# Patient Record
Sex: FEMALE | Race: BLACK OR AFRICAN AMERICAN | NOT HISPANIC OR LATINO | Employment: OTHER | URBAN - METROPOLITAN AREA
[De-identification: names, ages, dates, MRNs, and addresses within clinical notes are randomized per-mention and may not be internally consistent; named-entity substitution may affect disease eponyms.]

---

## 2017-01-01 ENCOUNTER — HOSPITAL ENCOUNTER (OUTPATIENT)
Dept: PULMONOLOGY | Facility: CLINIC | Age: 62
Discharge: HOME | End: 2017-09-11
Payer: MEDICARE

## 2017-01-01 ENCOUNTER — OFFICE VISIT (OUTPATIENT)
Dept: TRANSPLANT | Facility: CLINIC | Age: 62
End: 2017-01-01
Payer: MEDICARE

## 2017-01-01 ENCOUNTER — LAB VISIT (OUTPATIENT)
Dept: LAB | Facility: HOSPITAL | Age: 62
End: 2017-01-01
Attending: INTERNAL MEDICINE
Payer: MEDICARE

## 2017-01-01 ENCOUNTER — PATIENT MESSAGE (OUTPATIENT)
Dept: HEMATOLOGY/ONCOLOGY | Facility: CLINIC | Age: 62
End: 2017-01-01

## 2017-01-01 ENCOUNTER — TELEPHONE (OUTPATIENT)
Dept: HEMATOLOGY/ONCOLOGY | Facility: CLINIC | Age: 62
End: 2017-01-01

## 2017-01-01 ENCOUNTER — OFFICE VISIT (OUTPATIENT)
Dept: HEMATOLOGY/ONCOLOGY | Facility: CLINIC | Age: 62
End: 2017-01-01
Payer: MEDICARE

## 2017-01-01 ENCOUNTER — OFFICE VISIT (OUTPATIENT)
Dept: ORTHOPEDICS | Facility: CLINIC | Age: 62
End: 2017-01-01
Payer: MEDICARE

## 2017-01-01 ENCOUNTER — HOSPITAL ENCOUNTER (OUTPATIENT)
Dept: PULMONOLOGY | Facility: CLINIC | Age: 62
Discharge: HOME | End: 2017-12-11
Payer: MEDICARE

## 2017-01-01 ENCOUNTER — HOSPITAL ENCOUNTER (OUTPATIENT)
Dept: PULMONOLOGY | Facility: CLINIC | Age: 62
Discharge: HOME | End: 2017-06-15
Payer: MEDICARE

## 2017-01-01 ENCOUNTER — PATIENT MESSAGE (OUTPATIENT)
Dept: TRANSPLANT | Facility: CLINIC | Age: 62
End: 2017-01-01

## 2017-01-01 ENCOUNTER — TELEPHONE (OUTPATIENT)
Dept: ORTHOPEDICS | Facility: CLINIC | Age: 62
End: 2017-01-01

## 2017-01-01 ENCOUNTER — HOSPITAL ENCOUNTER (OUTPATIENT)
Dept: RADIOLOGY | Facility: HOSPITAL | Age: 62
Discharge: HOME | End: 2017-12-11
Attending: INTERNAL MEDICINE
Payer: MEDICARE

## 2017-01-01 ENCOUNTER — HOSPITAL ENCOUNTER (OUTPATIENT)
Dept: RADIOLOGY | Facility: HOSPITAL | Age: 62
Discharge: HOME | End: 2017-09-11
Attending: INTERNAL MEDICINE
Payer: MEDICARE

## 2017-01-01 ENCOUNTER — DOCUMENTATION ONLY (OUTPATIENT)
Dept: TRANSPLANT | Facility: CLINIC | Age: 62
End: 2017-01-01

## 2017-01-01 ENCOUNTER — HOSPITAL ENCOUNTER (OUTPATIENT)
Dept: RADIOLOGY | Facility: HOSPITAL | Age: 62
Discharge: HOME | End: 2017-05-01
Attending: ORTHOPAEDIC SURGERY
Payer: MEDICARE

## 2017-01-01 ENCOUNTER — HOSPITAL ENCOUNTER (OUTPATIENT)
Dept: RADIOLOGY | Facility: HOSPITAL | Age: 62
Discharge: HOME | End: 2017-06-15
Attending: INTERNAL MEDICINE
Payer: MEDICARE

## 2017-01-01 ENCOUNTER — HOSPITAL ENCOUNTER (OUTPATIENT)
Dept: PULMONOLOGY | Facility: CLINIC | Age: 62
Discharge: HOME | End: 2017-12-21
Payer: MEDICARE

## 2017-01-01 VITALS — BODY MASS INDEX: 30.29 KG/M2 | WEIGHT: 193 LBS | HEIGHT: 67 IN

## 2017-01-01 VITALS
WEIGHT: 198.44 LBS | HEART RATE: 80 BPM | HEIGHT: 68 IN | TEMPERATURE: 98 F | SYSTOLIC BLOOD PRESSURE: 146 MMHG | DIASTOLIC BLOOD PRESSURE: 93 MMHG | RESPIRATION RATE: 18 BRPM | OXYGEN SATURATION: 100 % | BODY MASS INDEX: 30.07 KG/M2

## 2017-01-01 VITALS
HEIGHT: 67 IN | TEMPERATURE: 99 F | WEIGHT: 192.44 LBS | BODY MASS INDEX: 30.2 KG/M2 | DIASTOLIC BLOOD PRESSURE: 91 MMHG | SYSTOLIC BLOOD PRESSURE: 143 MMHG | HEART RATE: 88 BPM

## 2017-01-01 VITALS
HEART RATE: 100 BPM | BODY MASS INDEX: 30.76 KG/M2 | RESPIRATION RATE: 20 BRPM | WEIGHT: 196 LBS | OXYGEN SATURATION: 97 % | SYSTOLIC BLOOD PRESSURE: 135 MMHG | DIASTOLIC BLOOD PRESSURE: 82 MMHG | HEIGHT: 67 IN | TEMPERATURE: 97 F

## 2017-01-01 VITALS
OXYGEN SATURATION: 99 % | DIASTOLIC BLOOD PRESSURE: 88 MMHG | WEIGHT: 195 LBS | HEIGHT: 67 IN | RESPIRATION RATE: 20 BRPM | HEART RATE: 94 BPM | BODY MASS INDEX: 30.61 KG/M2 | SYSTOLIC BLOOD PRESSURE: 133 MMHG | TEMPERATURE: 97 F

## 2017-01-01 VITALS
HEART RATE: 97 BPM | BODY MASS INDEX: 29.84 KG/M2 | HEIGHT: 67 IN | WEIGHT: 190.13 LBS | DIASTOLIC BLOOD PRESSURE: 95 MMHG | SYSTOLIC BLOOD PRESSURE: 131 MMHG

## 2017-01-01 VITALS
RESPIRATION RATE: 18 BRPM | SYSTOLIC BLOOD PRESSURE: 133 MMHG | TEMPERATURE: 98 F | BODY MASS INDEX: 29.27 KG/M2 | HEART RATE: 92 BPM | OXYGEN SATURATION: 99 % | HEIGHT: 68 IN | WEIGHT: 193.13 LBS | DIASTOLIC BLOOD PRESSURE: 81 MMHG

## 2017-01-01 VITALS
SYSTOLIC BLOOD PRESSURE: 124 MMHG | HEIGHT: 67 IN | HEART RATE: 98 BPM | OXYGEN SATURATION: 99 % | BODY MASS INDEX: 29.98 KG/M2 | TEMPERATURE: 98 F | DIASTOLIC BLOOD PRESSURE: 83 MMHG | WEIGHT: 191 LBS | RESPIRATION RATE: 20 BRPM

## 2017-01-01 DIAGNOSIS — C83.30 DIFFUSE LARGE B-CELL LYMPHOMA, UNSPECIFIED BODY REGION: ICD-10-CM

## 2017-01-01 DIAGNOSIS — T86.818: ICD-10-CM

## 2017-01-01 DIAGNOSIS — Z79.2 PROPHYLACTIC ANTIBIOTIC: ICD-10-CM

## 2017-01-01 DIAGNOSIS — J34.89 SINUS DRAINAGE: ICD-10-CM

## 2017-01-01 DIAGNOSIS — E83.42 HYPOMAGNESEMIA: ICD-10-CM

## 2017-01-01 DIAGNOSIS — Z94.2 HISTORY OF LUNG TRANSPLANT: ICD-10-CM

## 2017-01-01 DIAGNOSIS — Z48.298 AFTERCARE FOLLOWING ORGAN TRANSPLANT: Primary | ICD-10-CM

## 2017-01-01 DIAGNOSIS — S22.000A COMPRESSION FX, THORACIC SPINE, CLOSED, INITIAL ENCOUNTER: Primary | ICD-10-CM

## 2017-01-01 DIAGNOSIS — S22.000A COMPRESSION FRACTURE OF THORACIC VERTEBRA, CLOSED, INITIAL ENCOUNTER: ICD-10-CM

## 2017-01-01 DIAGNOSIS — Z94.2 LUNG REPLACED BY TRANSPLANT: ICD-10-CM

## 2017-01-01 DIAGNOSIS — C83.30 DLBCL (DIFFUSE LARGE B CELL LYMPHOMA): ICD-10-CM

## 2017-01-01 DIAGNOSIS — D84.9 IMMUNOSUPPRESSION: ICD-10-CM

## 2017-01-01 DIAGNOSIS — N18.3 CKD (CHRONIC KIDNEY DISEASE), STAGE 3 (MODERATE): ICD-10-CM

## 2017-01-01 DIAGNOSIS — C83.38 DIFFUSE LARGE B-CELL LYMPHOMA OF LYMPH NODES OF MULTIPLE REGIONS: ICD-10-CM

## 2017-01-01 DIAGNOSIS — J34.89 SINUS DRAINAGE: Primary | ICD-10-CM

## 2017-01-01 DIAGNOSIS — Z94.2 LUNG REPLACED BY TRANSPLANT: Primary | ICD-10-CM

## 2017-01-01 DIAGNOSIS — Z48.298 ENCOUNTER FOLLOWING ORGAN TRANSPLANT: Primary | ICD-10-CM

## 2017-01-01 DIAGNOSIS — M54.50 LUMBAR SPINE PAIN: Primary | ICD-10-CM

## 2017-01-01 DIAGNOSIS — C83.38 DIFFUSE LARGE B-CELL LYMPHOMA OF LYMPH NODES OF MULTIPLE REGIONS: Primary | ICD-10-CM

## 2017-01-01 DIAGNOSIS — N18.30 CHRONIC KIDNEY DISEASE (CKD), STAGE III (MODERATE): ICD-10-CM

## 2017-01-01 DIAGNOSIS — Z94.2 LUNG TRANSPLANTED: ICD-10-CM

## 2017-01-01 DIAGNOSIS — T86.819 COMPLICATION OF TRANSPLANTED LUNG, UNSPECIFIED COMPLICATION: Primary | ICD-10-CM

## 2017-01-01 DIAGNOSIS — D47.Z1: ICD-10-CM

## 2017-01-01 DIAGNOSIS — R53.81 MALAISE: ICD-10-CM

## 2017-01-01 DIAGNOSIS — M54.50 LUMBAR SPINE PAIN: ICD-10-CM

## 2017-01-01 DIAGNOSIS — C83.30 DLBCL (DIFFUSE LARGE B CELL LYMPHOMA): Primary | ICD-10-CM

## 2017-01-01 DIAGNOSIS — G89.3 CANCER ASSOCIATED PAIN: ICD-10-CM

## 2017-01-01 LAB
ALBUMIN SERPL BCP-MCNC: 3.7 G/DL
ALBUMIN SERPL BCP-MCNC: 3.8 G/DL
ALBUMIN SERPL BCP-MCNC: 3.8 G/DL
ALP SERPL-CCNC: 52 U/L
ALP SERPL-CCNC: 55 U/L
ALP SERPL-CCNC: 62 U/L
ALT SERPL W/O P-5'-P-CCNC: 16 U/L
ALT SERPL W/O P-5'-P-CCNC: 17 U/L
ALT SERPL W/O P-5'-P-CCNC: 17 U/L
ANION GAP SERPL CALC-SCNC: 11 MMOL/L
ANION GAP SERPL CALC-SCNC: 8 MMOL/L
ANION GAP SERPL CALC-SCNC: 9 MMOL/L
AST SERPL-CCNC: 18 U/L
AST SERPL-CCNC: 22 U/L
AST SERPL-CCNC: 25 U/L
BASOPHILS # BLD AUTO: 0.02 K/UL
BASOPHILS # BLD AUTO: 0.03 K/UL
BASOPHILS NFR BLD: 0.3 %
BASOPHILS NFR BLD: 0.5 %
BILIRUB SERPL-MCNC: 0.4 MG/DL
BILIRUB SERPL-MCNC: 0.4 MG/DL
BILIRUB SERPL-MCNC: 0.5 MG/DL
BUN SERPL-MCNC: 19 MG/DL
BUN SERPL-MCNC: 19 MG/DL
BUN SERPL-MCNC: 25 MG/DL
CALCIUM SERPL-MCNC: 9.5 MG/DL
CALCIUM SERPL-MCNC: 9.6 MG/DL
CALCIUM SERPL-MCNC: 9.7 MG/DL
CHLORIDE SERPL-SCNC: 103 MMOL/L
CHLORIDE SERPL-SCNC: 106 MMOL/L
CHLORIDE SERPL-SCNC: 107 MMOL/L
CO2 SERPL-SCNC: 23 MMOL/L
CO2 SERPL-SCNC: 27 MMOL/L
CO2 SERPL-SCNC: 28 MMOL/L
CREAT SERPL-MCNC: 1.2 MG/DL
CREAT SERPL-MCNC: 1.6 MG/DL
CREAT SERPL-MCNC: 1.7 MG/DL
DIFFERENTIAL METHOD: ABNORMAL
DIFFERENTIAL METHOD: ABNORMAL
ENTEROVIRUS: NOT DETECTED
EOSINOPHIL # BLD AUTO: 0.2 K/UL
EOSINOPHIL # BLD AUTO: 0.2 K/UL
EOSINOPHIL NFR BLD: 2.3 %
EOSINOPHIL NFR BLD: 3.6 %
ERYTHROCYTE [DISTWIDTH] IN BLOOD BY AUTOMATED COUNT: 12.7 %
ERYTHROCYTE [DISTWIDTH] IN BLOOD BY AUTOMATED COUNT: 13.2 %
EST. GFR  (AFRICAN AMERICAN): 37.3 ML/MIN/1.73 M^2
EST. GFR  (AFRICAN AMERICAN): 40.1 ML/MIN/1.73 M^2
EST. GFR  (AFRICAN AMERICAN): 56.8 ML/MIN/1.73 M^2
EST. GFR  (NON AFRICAN AMERICAN): 32.3 ML/MIN/1.73 M^2
EST. GFR  (NON AFRICAN AMERICAN): 34.8 ML/MIN/1.73 M^2
EST. GFR  (NON AFRICAN AMERICAN): 49.2 ML/MIN/1.73 M^2
GLUCOSE SERPL-MCNC: 100 MG/DL
GLUCOSE SERPL-MCNC: 79 MG/DL
GLUCOSE SERPL-MCNC: 86 MG/DL
HCT VFR BLD AUTO: 36.4 %
HCT VFR BLD AUTO: 36.6 %
HGB BLD-MCNC: 12 G/DL
HGB BLD-MCNC: 12.5 G/DL
HUMAN BOCAVIRUS: NOT DETECTED
HUMAN CORONAVIRUS, COMMON COLD VIRUS: NOT DETECTED
IMM GRANULOCYTES # BLD AUTO: 0.02 K/UL
IMM GRANULOCYTES NFR BLD AUTO: 0.3 %
INFLUENZA A - H1N1-09: NOT DETECTED
LDH SERPL L TO P-CCNC: 169 U/L
LDH SERPL L TO P-CCNC: 216 U/L
LYMPHOCYTES # BLD AUTO: 1.4 K/UL
LYMPHOCYTES # BLD AUTO: 1.6 K/UL
LYMPHOCYTES NFR BLD: 23.2 %
LYMPHOCYTES NFR BLD: 23.9 %
MCH RBC QN AUTO: 33.6 PG
MCH RBC QN AUTO: 34.7 PG
MCHC RBC AUTO-ENTMCNC: 32.8 G/DL
MCHC RBC AUTO-ENTMCNC: 34.3 G/DL
MCV RBC AUTO: 101 FL
MCV RBC AUTO: 103 FL
MONOCYTES # BLD AUTO: 0.7 K/UL
MONOCYTES # BLD AUTO: 0.9 K/UL
MONOCYTES NFR BLD: 10.9 %
MONOCYTES NFR BLD: 14.4 %
NEUTROPHILS # BLD AUTO: 3.5 K/UL
NEUTROPHILS # BLD AUTO: 4 K/UL
NEUTROPHILS NFR BLD: 58 %
NEUTROPHILS NFR BLD: 60.9 %
NRBC BLD-RTO: 0 /100 WBC
PARAINFLUENZA: NOT DETECTED
PLATELET # BLD AUTO: 170 K/UL
PLATELET # BLD AUTO: 185 K/UL
PMV BLD AUTO: 9.6 FL
PMV BLD AUTO: 9.9 FL
POTASSIUM SERPL-SCNC: 3.7 MMOL/L
POTASSIUM SERPL-SCNC: 4 MMOL/L
POTASSIUM SERPL-SCNC: 4.1 MMOL/L
PRE FEV1 FVC: 84
PRE FEV1 FVC: 85
PRE FEV1 FVC: 86
PRE FEV1 FVC: 87
PRE FEV1: 1.89
PRE FEV1: 1.97
PRE FEV1: 2.05
PRE FEV1: 2.1
PRE FVC: 2.24
PRE FVC: 2.26
PRE FVC: 2.39
PRE FVC: 2.46
PREDICTED FEV1 FVC: 78
PREDICTED FEV1: 2.65
PREDICTED FVC: 3.35
PROT SERPL-MCNC: 6.3 G/DL
PROT SERPL-MCNC: 6.3 G/DL
PROT SERPL-MCNC: 6.5 G/DL
RBC # BLD AUTO: 3.57 M/UL
RBC # BLD AUTO: 3.6 M/UL
RVP - ADENOVIRUS: NOT DETECTED
RVP - HUMAN METAPNEUMOVIRUS (HMPV): NOT DETECTED
RVP - INFLUENZA A: NOT DETECTED
RVP - INFLUENZA B: NOT DETECTED
RVP - RESPIRATORY SYNCTIAL VIRUS (RSV) A: NOT DETECTED
RVP - RESPIRATORY VIRAL PANEL, SOURCE: NORMAL
RVP - RHINOVIRUS: NOT DETECTED
SODIUM SERPL-SCNC: 139 MMOL/L
SODIUM SERPL-SCNC: 140 MMOL/L
SODIUM SERPL-SCNC: 143 MMOL/L
WBC # BLD AUTO: 6.03 K/UL
WBC # BLD AUTO: 6.61 K/UL

## 2017-01-01 PROCEDURE — 99499 UNLISTED E&M SERVICE: CPT | Mod: S$GLB,,, | Performed by: INTERNAL MEDICINE

## 2017-01-01 PROCEDURE — 71020 XR CHEST PA AND LATERAL: CPT | Mod: 26,,, | Performed by: RADIOLOGY

## 2017-01-01 PROCEDURE — 87632 RESP VIRUS 6-11 TARGETS: CPT

## 2017-01-01 PROCEDURE — 3075F SYST BP GE 130 - 139MM HG: CPT | Mod: S$GLB,,, | Performed by: PHYSICIAN ASSISTANT

## 2017-01-01 PROCEDURE — 94010 BREATHING CAPACITY TEST: CPT | Mod: S$GLB,,, | Performed by: INTERNAL MEDICINE

## 2017-01-01 PROCEDURE — 99214 OFFICE O/P EST MOD 30 MIN: CPT | Mod: 25,S$GLB,, | Performed by: INTERNAL MEDICINE

## 2017-01-01 PROCEDURE — 36415 COLL VENOUS BLD VENIPUNCTURE: CPT

## 2017-01-01 PROCEDURE — 1160F RVW MEDS BY RX/DR IN RCRD: CPT | Mod: S$GLB,,, | Performed by: PHYSICIAN ASSISTANT

## 2017-01-01 PROCEDURE — 3080F DIAST BP >= 90 MM HG: CPT | Mod: S$GLB,,, | Performed by: INTERNAL MEDICINE

## 2017-01-01 PROCEDURE — 99999 PR PBB SHADOW E&M-EST. PATIENT-LVL III: CPT | Mod: PBBFAC,,, | Performed by: INTERNAL MEDICINE

## 2017-01-01 PROCEDURE — 3077F SYST BP >= 140 MM HG: CPT | Mod: S$GLB,,, | Performed by: INTERNAL MEDICINE

## 2017-01-01 PROCEDURE — 99204 OFFICE O/P NEW MOD 45 MIN: CPT | Mod: S$GLB,,, | Performed by: PHYSICIAN ASSISTANT

## 2017-01-01 PROCEDURE — 71020 XR CHEST PA AND LATERAL: CPT | Mod: TC

## 2017-01-01 PROCEDURE — 99215 OFFICE O/P EST HI 40 MIN: CPT | Mod: S$GLB,,, | Performed by: INTERNAL MEDICINE

## 2017-01-01 PROCEDURE — 3008F BODY MASS INDEX DOCD: CPT | Mod: S$GLB,,, | Performed by: INTERNAL MEDICINE

## 2017-01-01 PROCEDURE — 3079F DIAST BP 80-89 MM HG: CPT | Mod: S$GLB,,, | Performed by: INTERNAL MEDICINE

## 2017-01-01 PROCEDURE — 3075F SYST BP GE 130 - 139MM HG: CPT | Mod: S$GLB,,, | Performed by: INTERNAL MEDICINE

## 2017-01-01 PROCEDURE — 72120 X-RAY BEND ONLY L-S SPINE: CPT | Mod: 26,,, | Performed by: RADIOLOGY

## 2017-01-01 PROCEDURE — 80053 COMPREHEN METABOLIC PANEL: CPT

## 2017-01-01 PROCEDURE — 99999 PR PBB SHADOW E&M-EST. PATIENT-LVL IV: CPT | Mod: PBBFAC,,, | Performed by: PHYSICIAN ASSISTANT

## 2017-01-01 PROCEDURE — 72100 X-RAY EXAM L-S SPINE 2/3 VWS: CPT | Mod: 26,,, | Performed by: RADIOLOGY

## 2017-01-01 PROCEDURE — 99499 UNLISTED E&M SERVICE: CPT | Mod: S$GLB,,, | Performed by: PHYSICIAN ASSISTANT

## 2017-01-01 PROCEDURE — 83615 LACTATE (LD) (LDH) ENZYME: CPT

## 2017-01-01 PROCEDURE — 85025 COMPLETE CBC W/AUTO DIFF WBC: CPT

## 2017-01-01 PROCEDURE — 3080F DIAST BP >= 90 MM HG: CPT | Mod: S$GLB,,, | Performed by: PHYSICIAN ASSISTANT

## 2017-01-01 PROCEDURE — 1160F RVW MEDS BY RX/DR IN RCRD: CPT | Mod: S$GLB,,, | Performed by: INTERNAL MEDICINE

## 2017-01-01 RX ORDER — PREDNISONE 5 MG/1
5 TABLET ORAL DAILY
Qty: 90 TABLET | Refills: 3 | Status: SHIPPED | OUTPATIENT
Start: 2017-01-01

## 2017-01-01 RX ORDER — TACROLIMUS 0.5 MG/1
0.5 CAPSULE ORAL EVERY 12 HOURS
Qty: 180 CAPSULE | Refills: 3 | Status: SHIPPED | OUTPATIENT
Start: 2017-01-01 | End: 2018-01-01 | Stop reason: SDUPTHER

## 2017-01-01 RX ORDER — TRAMADOL HYDROCHLORIDE 50 MG/1
50 TABLET ORAL EVERY 6 HOURS PRN
Qty: 90 TABLET | Refills: 0 | Status: SHIPPED | OUTPATIENT
Start: 2017-01-01 | End: 2018-01-01 | Stop reason: SDUPTHER

## 2017-01-01 RX ORDER — TACROLIMUS 1 MG/1
1 CAPSULE ORAL EVERY 12 HOURS
Qty: 180 CAPSULE | Refills: 3 | Status: SHIPPED | OUTPATIENT
Start: 2017-01-01

## 2017-01-01 RX ORDER — SULFAMETHOXAZOLE AND TRIMETHOPRIM 800; 160 MG/1; MG/1
1 TABLET ORAL
Qty: 36 TABLET | Refills: 4 | Status: SHIPPED | OUTPATIENT
Start: 2017-01-01

## 2017-01-01 RX ORDER — TRAMADOL HYDROCHLORIDE 50 MG/1
50 TABLET ORAL EVERY 6 HOURS PRN
Qty: 90 TABLET | Refills: 3 | Status: SHIPPED | OUTPATIENT
Start: 2017-01-01 | End: 2017-01-01

## 2017-01-06 ENCOUNTER — LAB VISIT (OUTPATIENT)
Dept: LAB | Facility: HOSPITAL | Age: 62
End: 2017-01-06
Attending: INTERNAL MEDICINE
Payer: MEDICARE

## 2017-01-06 DIAGNOSIS — Z94.2 LUNG REPLACED BY TRANSPLANT: ICD-10-CM

## 2017-01-06 LAB
BASOPHILS # BLD AUTO: 0.01 K/UL
BASOPHILS NFR BLD: 0.2 %
DIFFERENTIAL METHOD: ABNORMAL
EOSINOPHIL # BLD AUTO: 0.4 K/UL
EOSINOPHIL NFR BLD: 8.2 %
ERYTHROCYTE [DISTWIDTH] IN BLOOD BY AUTOMATED COUNT: 13.6 %
HCT VFR BLD AUTO: 37 %
HGB BLD-MCNC: 12 G/DL
LYMPHOCYTES # BLD AUTO: 1.9 K/UL
LYMPHOCYTES NFR BLD: 39.9 %
MCH RBC QN AUTO: 32.2 PG
MCHC RBC AUTO-ENTMCNC: 32.4 %
MCV RBC AUTO: 99 FL
MONOCYTES # BLD AUTO: 0.6 K/UL
MONOCYTES NFR BLD: 11.9 %
NEUTROPHILS # BLD AUTO: 1.8 K/UL
NEUTROPHILS NFR BLD: 39.4 %
PLATELET # BLD AUTO: 158 K/UL
PMV BLD AUTO: 10.9 FL
RBC # BLD AUTO: 3.73 M/UL
WBC # BLD AUTO: 4.64 K/UL

## 2017-01-06 PROCEDURE — 36415 COLL VENOUS BLD VENIPUNCTURE: CPT | Mod: PO

## 2017-01-06 PROCEDURE — 85025 COMPLETE CBC W/AUTO DIFF WBC: CPT

## 2017-01-09 LAB
CYTOMEGALOVIRUS DNA: NOT DETECTED
CYTOMEGALOVIRUS LOG (IU/ML): <2.4 LOG (10) COPIES/ML
CYTOMEGALOVIRUS PCR, QUANT: <250 COPIES/ML
CYTOMEGALOVIRUS SOURCE: NORMAL

## 2017-01-11 ENCOUNTER — LAB VISIT (OUTPATIENT)
Dept: LAB | Facility: HOSPITAL | Age: 62
End: 2017-01-11
Attending: INTERNAL MEDICINE
Payer: MEDICARE

## 2017-01-11 ENCOUNTER — OFFICE VISIT (OUTPATIENT)
Dept: INTERNAL MEDICINE | Facility: CLINIC | Age: 62
End: 2017-01-11
Payer: MEDICARE

## 2017-01-11 VITALS
SYSTOLIC BLOOD PRESSURE: 142 MMHG | BODY MASS INDEX: 29.65 KG/M2 | HEIGHT: 67 IN | WEIGHT: 188.94 LBS | HEART RATE: 88 BPM | DIASTOLIC BLOOD PRESSURE: 90 MMHG

## 2017-01-11 DIAGNOSIS — E78.5 HYPERLIPIDEMIA, UNSPECIFIED HYPERLIPIDEMIA TYPE: ICD-10-CM

## 2017-01-11 DIAGNOSIS — Z00.00 ENCOUNTER FOR PREVENTIVE HEALTH EXAMINATION: Primary | ICD-10-CM

## 2017-01-11 DIAGNOSIS — Z94.2 LUNG REPLACED BY TRANSPLANT: ICD-10-CM

## 2017-01-11 DIAGNOSIS — D47.Z1 POST-TRANSPLANT LYMPHOPROLIFERATIVE DISORDER: ICD-10-CM

## 2017-01-11 DIAGNOSIS — T45.1X5A CHEMOTHERAPY-INDUCED NEUTROPENIA: ICD-10-CM

## 2017-01-11 DIAGNOSIS — C83.38 DIFFUSE LARGE B-CELL LYMPHOMA OF LYMPH NODES OF MULTIPLE REGIONS: ICD-10-CM

## 2017-01-11 DIAGNOSIS — N18.30 STAGE 3 CHRONIC KIDNEY DISEASE: ICD-10-CM

## 2017-01-11 DIAGNOSIS — D84.9 IMMUNOSUPPRESSION: ICD-10-CM

## 2017-01-11 DIAGNOSIS — M48.54XA COMPRESSION FRACTURE OF THORACIC VERTEBRA, NON-TRAUMATIC, INITIAL ENCOUNTER: ICD-10-CM

## 2017-01-11 DIAGNOSIS — D70.1 CHEMOTHERAPY-INDUCED NEUTROPENIA: ICD-10-CM

## 2017-01-11 DIAGNOSIS — F32.0 MAJOR DEPRESSIVE DISORDER, SINGLE EPISODE, MILD: ICD-10-CM

## 2017-01-11 DIAGNOSIS — I10 ESSENTIAL HYPERTENSION: ICD-10-CM

## 2017-01-11 DIAGNOSIS — G47.33 OSA (OBSTRUCTIVE SLEEP APNEA): ICD-10-CM

## 2017-01-11 LAB
BASOPHILS # BLD AUTO: 0.02 K/UL
BASOPHILS NFR BLD: 0.6 %
DIFFERENTIAL METHOD: ABNORMAL
EOSINOPHIL # BLD AUTO: 0.2 K/UL
EOSINOPHIL NFR BLD: 5.1 %
ERYTHROCYTE [DISTWIDTH] IN BLOOD BY AUTOMATED COUNT: 13.3 %
HCT VFR BLD AUTO: 34.9 %
HGB BLD-MCNC: 11.3 G/DL
LYMPHOCYTES # BLD AUTO: 1.2 K/UL
LYMPHOCYTES NFR BLD: 34.9 %
MCH RBC QN AUTO: 32.2 PG
MCHC RBC AUTO-ENTMCNC: 32.4 %
MCV RBC AUTO: 99 FL
MONOCYTES # BLD AUTO: 0.4 K/UL
MONOCYTES NFR BLD: 12.4 %
NEUTROPHILS # BLD AUTO: 1.6 K/UL
NEUTROPHILS NFR BLD: 46.2 %
PLATELET # BLD AUTO: 139 K/UL
PMV BLD AUTO: 10.6 FL
RBC # BLD AUTO: 3.51 M/UL
WBC # BLD AUTO: 3.55 K/UL

## 2017-01-11 PROCEDURE — 99999 PR PBB SHADOW E&M-EST. PATIENT-LVL V: CPT | Mod: PBBFAC,,, | Performed by: NURSE PRACTITIONER

## 2017-01-11 PROCEDURE — 3077F SYST BP >= 140 MM HG: CPT | Mod: S$GLB,,, | Performed by: NURSE PRACTITIONER

## 2017-01-11 PROCEDURE — 3080F DIAST BP >= 90 MM HG: CPT | Mod: S$GLB,,, | Performed by: NURSE PRACTITIONER

## 2017-01-11 PROCEDURE — G0439 PPPS, SUBSEQ VISIT: HCPCS | Mod: S$GLB,,, | Performed by: NURSE PRACTITIONER

## 2017-01-11 PROCEDURE — 99499 UNLISTED E&M SERVICE: CPT | Mod: S$GLB,,, | Performed by: NURSE PRACTITIONER

## 2017-01-11 NOTE — PATIENT INSTRUCTIONS
Counseling and Referral of Other Preventative  (Italic type indicates deductible and co-insurance are waived)    Patient Name: Lena Lynn  Today's Date: 1/11/2017      SERVICE LIMITATIONS RECOMMENDATION    Vaccines    · Pneumococcal (once after 65)    · Influenza (annually)    · Hepatitis B (if medium/high risk)    · Prevnar 13      Hepatitis B medium/high risk factors:       - End-stage renal disease       - Hemophiliacs who received Factor VII or         IX concentrates       - Clients of institutions for the mentally             retarded       - Persons who live in the same house as          a HepB carrier       - Homosexual men       - Illicit injectable drug abusers     Pneumococcal: Done, no repeat necessary     Influenza: Scheduled - see appointments     Hepatitis B: N/A defer     Prevnar 13: Scheduled - see appointments    Mammogram (biennial age 50-74)  Annually (age 40 or over)  Recommended to patient, declined    Pap (up to age 70 and after 70 if unknown history or abnormal study last 10 years)    Recommended to patient, declined     The USPSTF recommends screening for cervical cancer in women age 21 to 65 years with cytology (Pap smear) every 3 years.     Colorectal cancer screening (to age 75)    · Fecal occult blood test (annual)  · Flexible sigmoidoscopy (5y)  · Screening colonoscopy (10y)  · Barium enema   Recommended to patient, declined    Diabetes self-management training (no USPSTF recommendations)  Requires referral by treating physician for patient with diabetes or renal disease. 10 hours of initial DSMT sessions of no less than 30 minutes each in a continuous 12-month period. 2 hours of follow-up DSMT in subsequent years.  N/A n/a    Bone mass measurements (age 65 & older, biennial)  Requires diagnosis related to osteoporosis or estrogen deficiency. Biennial benefit unless patient has history of long-term glucocorticoid  Recommended to patient, declined    Glaucoma screening (no USPSTF  recommendation)  Diabetes mellitus, family history   , age 50 or over    American, age 65 or over  Done this year, repeat every year    Medical nutrition therapy for diabetes or renal disease (no recommended schedule)  Requires referral by treating physician for patient with diabetes or renal disease or kidney transplant within the past 3 years.  Can be provided in same year as diabetes self-management training (DSMT), and CMS recommends medical nutrition therapy take place after DSMT. Up to 3 hours for initial year and 2 hours in subsequent years.  N/A n/a    Cardiovascular screening blood tests (every 5 years)  · Fasting lipid panel  Order as a panel if possible  Done this year, repeat every year    Diabetes screening tests (at least every 3 years, Medicare covers annually or at 6-month intervals for prediabetic patients)  · Fasting blood sugar (FBS) or glucose tolerance test (GTT)  Patient must be diagnosed with one of the following:       - Hypertension       - Dyslipidemia       - Obesity (BMI 30kg/m2)       - Previous elevated impaired FBS or GTT       ... or any two of the following:       - Overweight (BMI 25 but <30)       - Family history of diabetes       - Age 65 or older       - History of gestational diabetes or birth of baby weighing more than 9 pounds  Done this year, repeat every year    Abdominal aortic aneurysm screening (once)  · Sonogram   Limited to patients who meet one of the following criteria:       - Men who are 65-75 years old and have smoked more than 100 cigarette in their lifetime       - Anyone with a family history of abdominal aortic aneurysm       - Anyone recommended for screening by the USPSTF  N/A n/a    HIV screening (annually for increased risk patients)  · HIV-1 and HIV-2 by EIA, or SENA, rapid antibody test or oral mucosa transudate  Patients must be at increased risk for HIV infection per USPSTF guidelines or pregnant. Tests covered annually for  patient at increased risk or as requested by the patient. Pregnant patients may receive up to 3 tests during pregnancy.  Risks discussed, screening is not recommended    Smoking cessation counseling (up to 8 sessions per year)  Patients must be asymptomatic of tobacco-related conditions to receive as a preventative service.  n/a    Subsequent annual wellness visit  At least 12 months since last AWV  Return in one year     The following information is provided to all patients.  This information is to help you find resources for any of the problems found today that may be affecting your health:                Living healthy guide: www.[facility].[place].[facility]      Understanding Diabetes: www.diabetes.org      Eating healthy: www.cdc.gov/healthyweight      [facility] home safety checklist: www.cdc.gov/steadi/patient.html      Agency on Aging: www.goea.[place].[facility]      Alcoholics anonymous (AA): www.aa.org      Physical Activity: www.[place].nih.gov/ve9rihc      Tobacco use: www.quitwithusla.org

## 2017-01-11 NOTE — MR AVS SNAPSHOT
[facility] - Internal Medicine  [address]  [facility] [address]  Phone: [phone]  Fax: [phone]                  Lena Lynn   2017 10:00 AM   Office Visit    Description:  Female : 1955   Provider:  PARMINDER WINSTON 5   Department:  [facility] - Internal Medicine           Reason for Visit     Health Risk Assessment           Diagnoses this Visit        Comments    Stage 3 chronic kidney disease    -  Primary     Post-transplant lymphoproliferative disorder         DOROTHY (obstructive sleep apnea)         Major depressive disorder, single episode, mild         Lung replaced by transplant         Essential hypertension         Hyperlipidemia, unspecified hyperlipidemia type         Diffuse large B-cell lymphoma of lymph nodes of multiple regions         Compression fracture of thoracic vertebra, non-traumatic, initial encounter         Chemotherapy-induced neutropenia         Need for pneumococcal vaccine         Encounter for preventive health examination                To Do List           Future Appointments        Provider Department Dept Phone    2017 8:25 AM LAB, TRANSPLANT [facility] [phone]    2017 8:45 AM [facility] [id number] LB LIMIT [facility] [phone]    2017 9:15 AM PULMONARY FUNCTION [facility] Pulmonary Lab [phone]    2017 9:30 AM Erick Nieves MD [facility] Lung Transplant [phone]      Goals (5 Years of Data)              16    Blood Pressure < 130/85   122/70  150/81  135/77      [facility]     [facility] Care [facility] -  Assistance  Registered nurses in the [facility] provide clinical advisement, health education, appointment booking, and other advisory services.  Call for this free service at [phone].             Medications           Message regarding Medications     Verify the changes and/or additions to your medication regime  listed below are the same as discussed with your clinician today.  If any of these changes or additions are incorrect, please notify your healthcare provider.             Verify that the below list of medications is an accurate representation of the medications you are currently taking.  If none reported, the list may be blank. If incorrect, please contact your healthcare provider. Carry this list with you in case of emergency.           Current Medications     alendronate (FOSAMAX) 70 MG tablet Take 1 tablet (70 mg total) by mouth every 7 days.    aspirin (ECOTRIN) 81 MG EC tablet Take 1 tablet (81 mg total) by mouth once daily.    calcium carbonate (OS-JERONIMO) 600 mg (1,500 mg) Tab Take 1 tablet (600 mg total) by mouth 2 (two) times daily with meals.    citalopram (CELEXA) 20 MG tablet TAKE 1 TABLET EVERY DAY    diphenhydrAMINE (BENADRYL) 25 mg capsule Take 50 mg by mouth nightly as needed.     hydrocodone-acetaminophen 5-325mg (NORCO) 5-325 mg per tablet Take 1 tablet by mouth every 6 (six) hours as needed for Pain.    lidocaine-prilocaine (EMLA) cream Apply to affected area once    lorazepam (ATIVAN) 1 MG tablet Take 1 tablet (1 mg total) by mouth every 12 (twelve) hours as needed for Anxiety.    magnesium oxide (MAG-OX) 400 mg tablet Take 1 tablet (400 mg total) by mouth 2 (two) times daily.    multivitamin (MULTIVITAMIN) per tablet Take 1 tablet by mouth once daily.      omeprazole (PRILOSEC) 40 MG capsule Take 1 capsule (40 mg total) by mouth once daily.    ondansetron (ZOFRAN-ODT) 8 MG TbDL Take 1 tablet (8 mg total) by mouth every 8 (eight) hours as needed.    predniSONE (DELTASONE) 5 MG tablet TAKE 1 TABLET ONE TIME DAILY    predniSONE (DELTASONE) 50 MG Tab Take 2 tablets (100 mg total) by mouth once daily. On day of chemotherapy and following 4 days. 5 days total.    sulfamethoxazole-trimethoprim 800-160mg (BACTRIM DS) 800-160 mg Tab Take 1 tablet by mouth every Mon, Wed, Fri.    tacrolimus (PROGRAF) 0.5 MG  "Cap Take 1 capsule (0.5 mg total) by mouth once daily. Take in am    tacrolimus (PROGRAF) 1 MG Cap Take 1 capsule (1 mg total) by mouth every 12 (twelve) hours. Daily doses: 1.5 mg in am, 1 mg in pm    tramadol (ULTRAM) 50 mg tablet Take 1 tablet (50 mg total) by mouth every 6 (six) hours as needed for Pain.    trazodone (DESYREL) 50 MG tablet Please provide 3 months supply  I pill PRN QHS Insomnia           Clinical Reference Information           Vital Signs - Last Recorded  Most recent update: 1/11/2017 10:46 AM by Neetu Rogers NP    Pulse Ht Wt LMP BMI    88 5' 7" (1.702 m) 85.7 kg (188 lb 15 oz) (LMP Unknown) 29.59 kg/m2      Allergies as of 1/11/2017     No Known Allergies      Immunizations Administered on Date of Encounter - 1/11/2017     Name Date Dose VIS Date Route    Pneumococcal Conjugate - 13 Valent  Incomplete 0.5 mL 11/5/2015 Intramuscular      Orders Placed During Today's Visit      Normal Orders This Visit    Pneumococcal Conjugate Vaccine (13 Valent) (IM)       Maintenance Dialysis History     Patient has no recorded history of maintenance dialysis.      Transplant Information        Txp Date Organ Coordinator Care Team    4/25/2012 Lung Corrie Weiss RN Surgeon:  Alexei Silvestre MD   Co-Surgeon:  Francisco Farooq MD   Referring Physician:  Tommy Talavera MD         Instructions      Counseling and Referral of Other Preventative  (Italic type indicates deductible and co-insurance are waived)    Patient Name: Lena Lynn  Today's Date: 1/11/2017      SERVICE LIMITATIONS RECOMMENDATION    Vaccines    · Pneumococcal (once after 65)    · Influenza (annually)    · Hepatitis B (if medium/high risk)    · Prevnar 13      Hepatitis B medium/high risk factors:       - End-stage renal disease       - Hemophiliacs who received Factor VII or         IX concentrates       - Clients of institutions for the mentally             retarded       - Persons who live in the same house as          a HepB " carrier       - Homosexual men       - Illicit injectable drug abusers     Pneumococcal: Done, no repeat necessary     Influenza: Scheduled - see appointments     Hepatitis B: N/A defer     Prevnar 13: Scheduled - see appointments    Mammogram (biennial age 50-74)  Annually (age 40 or over)  Recommended to patient, declined    Pap (up to age 70 and after 70 if unknown history or abnormal study last 10 years)    Recommended to patient, declined     The USPSTF recommends screening for cervical cancer in women age 21 to 65 years with cytology (Pap smear) every 3 years.     Colorectal cancer screening (to age 75)    · Fecal occult blood test (annual)  · Flexible sigmoidoscopy (5y)  · Screening colonoscopy (10y)  · Barium enema   Recommended to patient, declined    Diabetes self-management training (no USPSTF recommendations)  Requires referral by treating physician for patient with diabetes or renal disease. 10 hours of initial DSMT sessions of no less than 30 minutes each in a continuous 12-month period. 2 hours of follow-up DSMT in subsequent years.  N/A n/a    Bone mass measurements (age 65 & older, biennial)  Requires diagnosis related to osteoporosis or estrogen deficiency. Biennial benefit unless patient has history of long-term glucocorticoid  Recommended to patient, declined    Glaucoma screening (no USPSTF recommendation)  Diabetes mellitus, family history   , age 50 or over    American, age 65 or over  Done this year, repeat every year    Medical nutrition therapy for diabetes or renal disease (no recommended schedule)  Requires referral by treating physician for patient with diabetes or renal disease or kidney transplant within the past 3 years.  Can be provided in same year as diabetes self-management training (DSMT), and CMS recommends medical nutrition therapy take place after DSMT. Up to 3 hours for initial year and 2 hours in subsequent years.  N/A n/a    Cardiovascular screening  blood tests (every 5 years)  · Fasting lipid panel  Order as a panel if possible  Done this year, repeat every year    Diabetes screening tests (at least every 3 years, Medicare covers annually or at 6-month intervals for prediabetic patients)  · Fasting blood sugar (FBS) or glucose tolerance test (GTT)  Patient must be diagnosed with one of the following:       - Hypertension       - Dyslipidemia       - Obesity (BMI 30kg/m2)       - Previous elevated impaired FBS or GTT       ... or any two of the following:       - Overweight (BMI 25 but <30)       - Family history of diabetes       - Age 65 or older       - History of gestational diabetes or birth of baby weighing more than 9 pounds  Done this year, repeat every year    Abdominal aortic aneurysm screening (once)  · Sonogram   Limited to patients who meet one of the following criteria:       - Men who are 65-75 years old and have smoked more than 100 cigarette in their lifetime       - Anyone with a family history of abdominal aortic aneurysm       - Anyone recommended for screening by the USPSTF  N/A n/a    HIV screening (annually for increased risk patients)  · HIV-1 and HIV-2 by EIA, or SENA, rapid antibody test or oral mucosa transudate  Patients must be at increased risk for HIV infection per USPSTF guidelines or pregnant. Tests covered annually for patient at increased risk or as requested by the patient. Pregnant patients may receive up to 3 tests during pregnancy.  Risks discussed, screening is not recommended    Smoking cessation counseling (up to 8 sessions per year)  Patients must be asymptomatic of tobacco-related conditions to receive as a preventative service.  n/a    Subsequent annual wellness visit  At least 12 months since last AWV  Return in one year     The following information is provided to all patients.  This information is to help you find resources for any of the problems found today that may be affecting your health:                 Living healthy guide: www.[facility].[place].[facility]      Understanding Diabetes: www.diabetes.org      Eating healthy: www.cdc.gov/healthyweight      CDC home safety checklist: www.cdc.gov/steadi/patient.html      Agency on Aging: www.goea.[place].[facility]      Alcoholics anonymous (AA): www.aa.org      Physical Activity: www.[place].nih.gov/yo2tqaw      Tobacco use: www.quitwithusla.org

## 2017-01-11 NOTE — PROGRESS NOTES
"Lena Lynn presented for a  Medicare AWV and comprehensive Health Risk Assessment today. The following components were reviewed and updated:    · Medical history  · Family History  · Social history  · Allergies and Current Medications  · Health Risk Assessment  · Health Maintenance  · Care Team     ** See Completed Assessments for Annual Wellness Visit within the encounter summary.**       The following assessments were completed:  · Living Situation  · Depression Screening  · Timed Get Up and Go  · Whisper Test  · Cognitive Function Screening  · Nutrition Screening  · ADL Screening  · PAQ Screening            Vitals:    01/11/17 1042   BP: (!) 142/90   Pulse: 88   Weight: 85.7 kg (188 lb 15 oz)   Height: 5' 7" (1.702 m)     Body mass index is 29.59 kg/(m^2).     Physical Exam   Constitutional: She is oriented to person, place, and time. She appears well-developed and well-nourished. No distress.   HENT:   Head: Normocephalic and atraumatic.   Cardiovascular: Normal rate, regular rhythm and normal heart sounds.    No murmur heard.  Pulmonary/Chest: Effort normal and breath sounds normal. No respiratory distress. She has no wheezes. She has no rales.   Musculoskeletal: She exhibits no edema, tenderness or deformity.   Slow gait   Neurological: She is alert and oriented to person, place, and time.   Skin: Skin is warm and dry. She is not diaphoretic.   Psychiatric: She has a normal mood and affect. Her behavior is normal. She expresses no homicidal and no suicidal ideation. She expresses no suicidal plans and no homicidal plans.   Nursing note and vitals reviewed.        Diagnoses and health risks identified today and associated recommendations/orders:    1. Encounter for preventive health examination  Declined mammogram, colonoscopy, pap smear, and dxa scan.  Will defer Prevnar 13, patient wants to check with her Transplant Dr prior to receiving.     2. Lung replaced by transplant  Stable.   Followed by " Transplant.     3. Stage 3 chronic kidney disease  Stable.   Followed by Transplant.     4. Post-transplant lymphoproliferative disorder  Stable.   Followed by Hem/[facility].     5. DOROTHY (obstructive sleep apnea)  Stable.   Followed by Sleep Medicine.     6. Major depressive disorder, single episode, mild  Stable.   Continue current medication.  Followed by Transplant.     7. Essential hypertension  Elevated.  Asymptomatic. Denies HA, CP, SOB, and weakness.  She admitted to eating a pickle and chips prior to visit.   Advised to follow low sodium diet.    Followed by Transplant.     8. Hyperlipidemia, unspecified hyperlipidemia type  Stable.   Followed by Transplant.     9. Diffuse large B-cell lymphoma of lymph nodes of multiple regions  Stable.   Followed by Hem/Onc.     10. Compression fracture of thoracic vertebra, non-traumatic, initial encounter  Stable.   Followed by Transplant.     11. Chemotherapy-induced neutropenia  Stable.   Followed by Hem/Onc.     12. Immunosuppression  Stable.   Followed by Transplant.       Provided Lena with a 5-10 year written screening schedule and personal prevention plan. Recommendations were developed using the USPSTF age appropriate recommendations. Education, counseling, and referrals were provided as needed. After Visit Summary printed and given to patient which includes a list of additional screenings\tests needed.    Return in about 12 days (around 1/23/2017) for follow up with PCP and as needed.    Neetu Rogers NP

## 2017-01-23 ENCOUNTER — HOSPITAL ENCOUNTER (OUTPATIENT)
Dept: RADIOLOGY | Facility: HOSPITAL | Age: 62
Discharge: HOME | End: 2017-01-23
Attending: INTERNAL MEDICINE
Payer: MEDICARE

## 2017-01-23 ENCOUNTER — HOSPITAL ENCOUNTER (OUTPATIENT)
Dept: PULMONOLOGY | Facility: CLINIC | Age: 62
Discharge: HOME | End: 2017-01-23
Payer: MEDICARE

## 2017-01-23 ENCOUNTER — OFFICE VISIT (OUTPATIENT)
Dept: TRANSPLANT | Facility: CLINIC | Age: 62
End: 2017-01-23
Payer: MEDICARE

## 2017-01-23 VITALS
HEIGHT: 67 IN | WEIGHT: 189 LBS | RESPIRATION RATE: 20 BRPM | OXYGEN SATURATION: 98 % | HEART RATE: 91 BPM | TEMPERATURE: 98 F | SYSTOLIC BLOOD PRESSURE: 134 MMHG | BODY MASS INDEX: 29.66 KG/M2 | DIASTOLIC BLOOD PRESSURE: 98 MMHG

## 2017-01-23 DIAGNOSIS — Z79.2 PROPHYLACTIC ANTIBIOTIC: ICD-10-CM

## 2017-01-23 DIAGNOSIS — Z94.2 LUNG REPLACED BY TRANSPLANT: ICD-10-CM

## 2017-01-23 DIAGNOSIS — D84.9 IMMUNOSUPPRESSION: ICD-10-CM

## 2017-01-23 DIAGNOSIS — Z48.298 AFTERCARE FOLLOWING ORGAN TRANSPLANT: Primary | ICD-10-CM

## 2017-01-23 DIAGNOSIS — D47.Z1 POST-TRANSPLANT LYMPHOPROLIFERATIVE DISORDER: ICD-10-CM

## 2017-01-23 LAB
PRE FEV1 FVC: 84
PRE FEV1: 2.07
PRE FVC: 2.47
PREDICTED FEV1 FVC: 78
PREDICTED FEV1: 2.65
PREDICTED FVC: 3.35

## 2017-01-23 PROCEDURE — 94010 BREATHING CAPACITY TEST: CPT | Mod: S$GLB,,, | Performed by: INTERNAL MEDICINE

## 2017-01-23 PROCEDURE — 3080F DIAST BP >= 90 MM HG: CPT | Mod: S$GLB,,, | Performed by: INTERNAL MEDICINE

## 2017-01-23 PROCEDURE — 71020 XR CHEST PA AND LATERAL: CPT | Mod: 26,,, | Performed by: RADIOLOGY

## 2017-01-23 PROCEDURE — 99214 OFFICE O/P EST MOD 30 MIN: CPT | Mod: 25,S$GLB,, | Performed by: INTERNAL MEDICINE

## 2017-01-23 PROCEDURE — 99499 UNLISTED E&M SERVICE: CPT | Mod: S$GLB,,, | Performed by: INTERNAL MEDICINE

## 2017-01-23 PROCEDURE — 3075F SYST BP GE 130 - 139MM HG: CPT | Mod: S$GLB,,, | Performed by: INTERNAL MEDICINE

## 2017-01-23 PROCEDURE — 1159F MED LIST DOCD IN RCRD: CPT | Mod: S$GLB,,, | Performed by: INTERNAL MEDICINE

## 2017-01-23 PROCEDURE — 99999 PR PBB SHADOW E&M-EST. PATIENT-LVL III: CPT | Mod: PBBFAC,,, | Performed by: INTERNAL MEDICINE

## 2017-01-23 NOTE — PROGRESS NOTES
LUNG TRANSPLANT RECIPIENT FOLLOW-UP      Reason for Visit: Follow-up after lung transplantation.      Date of Transplant: 4/25/12      Reason for Transplant: Hypersensitivity Pneumonitis      Type of Transplant: bilateral sequential lung      CMV Status: D+ / R+      Major Complications:   1. 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 PTLD (vertebral) diagnosed on August 2016                                                                                   History of Present Illness: Lena Lynn is a 61 y.o. year old female with hx of HP, who is here to follow up. She complains of fatigue that has been persistent. She also complains of sleeping problems. Other than that, is doing well from a pulmonary standpoint. Is trying to lose weight. No cough or sputum production. No recent hospitalizations. No fevers/chills.       Review of Systems   Constitutional: Positive for malaise/fatigue. Negative for chills and fever.   Eyes: Negative for blurred vision.   Respiratory: Negative for cough, hemoptysis, sputum production, shortness of breath and wheezing.    Cardiovascular: Negative for chest pain and palpitations.   Gastrointestinal: Negative.    Musculoskeletal: Negative for back pain.   Skin: Negative for itching and rash.   Neurological: Negative for headaches.       Physical Exam   Constitutional: She is oriented to person, place, and time and well-developed, well-nourished, and in no distress.   HENT:   Head: Normocephalic and atraumatic.   Mouth/Throat: Oropharynx is clear and moist.   Eyes: Conjunctivae are normal. Right eye exhibits no discharge. Left eye exhibits no discharge. No scleral icterus.   Neck: Neck supple. No JVD present.   Cardiovascular: Normal rate, regular rhythm and normal heart sounds.    Pulmonary/Chest: Effort normal. No respiratory distress. She has no wheezes. She has rales.   Abdominal: Soft. Bowel sounds are normal. She exhibits no distension. There is no tenderness.   Musculoskeletal: Normal range of  motion. She exhibits no edema.   Neurological: She is alert and oriented to person, place, and time.   Skin: Skin is warm and dry. No rash noted. She is not diaphoretic.   Psychiatric: Affect normal.     Labs:  cbc, cmp, tacrolimus Latest Ref Rng & Units 1/6/2017 1/11/2017 1/23/2017   TACROLIMUS LVL 5.0 - 15.0 ng/mL - - -   WHITE BLOOD CELL COUNT 3.90 - 12.70 K/uL 4.64 3.55(L) 3.90   RBC 4.00 - 5.40 M/uL 3.73(L) 3.51(L) 3.74(L)   HEMOGLOBIN 12.0 - 16.0 g/dL 12.0 11.3(L) 12.0   HEMATOCRIT 37.0 - 48.5 % 37.0 34.9(L) 37.2   MCV 82 - 98 fL 99(H) 99(H) 100(H)   MCH 27.0 - 31.0 pg 32.2(H) 32.2(H) 32.1(H)   MCHC 32.0 - 36.0 % 32.4 32.4 32.3   RDW 11.5 - 14.5 % 13.6 13.3 13.1   PLATELETS 150 - 350 K/uL 158 139(L) 151   MPV 9.2 - 12.9 fL 10.9 10.6 10.5   GRAN # 1.8 - 7.7 K/uL 1.8 1.6(L) 2.2   LYMPH # 1.0 - 4.8 K/uL 1.9 1.2 1.0   MONO # 0.3 - 1.0 K/uL 0.6 0.4 0.4   EOSINOPHIL% 0.0 - 8.0 % 8.2(H) 5.1 3.6   BASOPHIL% 0.0 - 1.9 % 0.2 0.6 0.3   DIFFERENTIAL METHOD - Automated Automated Automated   SODIUM 136 - 145 mmol/L - - 143   POTASSIUM 3.5 - 5.1 mmol/L - - 4.3   CHLORIDE 95 - 110 mmol/L - - 111(H)   CO2 23 - 29 mmol/L - - 26   GLUCOSE 70 - 110 mg/dL - - 80   BUN BLD 6 - 20 mg/dL - - 23(H)   CREATININE 0.5 - 1.4 mg/dL - - 1.1   CALCIUM 8.7 - 10.5 mg/dL - - 9.5   PROTEIN TOTAL 6.0 - 8.4 g/dL - - 6.3   ALBUMIN 3.5 - 5.2 g/dL - - 3.6   BILIRUBIN TOTAL 0.1 - 1.0 mg/dL - - 0.3   ALK PHOS 55 - 135 U/L - - 58   AST 10 - 40 U/L - - 24   ALT 10 - 44 U/L - - 21   ANION GAP 8 - 16 mmol/L - - 6(L)   EGFR IF AFRICAN AMERICAN >60 mL/min/1.73 m:2 - - >60.0   EGFR IF NON-AFRICAN AMERICAN >60 mL/min/1.73 m:2 - - 54.7(A)     Pulmonary Function Tests 1/23/2017 12/8/2016 10/24/2016 10/17/2016 10/6/2016 9/22/2016 3/17/2016   FVC 2.47 2.46 2.62 2.71 2.48 2.41 2.91   FEV1 2.07 2.04 2.18 2.34 2.06 2.06 2.44   FVC% 75 74 79 81 74 72 87   FEV1% 78 77 82 88 77 77 92   FEF 25-75 2.37 2.35 2.49 3.1 2.4 2.5 2.77   FEF 25-75% 91 89 94 117 90 94 104        Imaging:  CXR 1/23:  Narrative   2 views of chest, comparison 2016.  Heart normal.  Mild elevation left diaphragm, postop sternotomy, right IJ venous port stable.  Kyphosis, remote anterior wedge deformity several mid thoracic vertebral bodies stable.   Impression    Stable chest.  No acute process.      Electronically signed by: LEONCIO LAU MD  Date: 01/23/17  Time: 09:09          Assessment:  1. Aftercare following organ transplant    2. Lung replaced by transplant    3. Immunosuppression    4. Prophylactic antibiotic    5. Post-transplant lymphoproliferative disorder      Plan:     1. PFT's and CXR stable. Encouraged exercise as tolerated.    2. Continue prograf, prednisone. Will adjust prograf according to levels.     3. Continue bactrim     4. She is s/p treatment with [facility]. Doing well and following with H/O.      Alicia Gavin MD  Pulmonary/Critical Care Fellow  [phone]      Attending Note:    I have seen and evaluated the patient with the fellow. Their note reflects the content of our discussion and my plan of care.  RTC in 6 weeks      Erick Nieves MD  Pulmonary/Critical Care Medicine

## 2017-01-30 DIAGNOSIS — Z94.2 LUNG REPLACED BY TRANSPLANT: Primary | ICD-10-CM

## 2017-02-10 DIAGNOSIS — Z78.0 POST-MENOPAUSAL: ICD-10-CM

## 2017-02-10 DIAGNOSIS — M81.8 DRUG-INDUCED OSTEOPOROSIS: ICD-10-CM

## 2017-02-10 DIAGNOSIS — T50.905A DRUG-INDUCED OSTEOPOROSIS: ICD-10-CM

## 2017-02-10 RX ORDER — ALENDRONATE SODIUM 70 MG/1
TABLET ORAL
Qty: 12 TABLET | Refills: 3 | Status: SHIPPED | OUTPATIENT
Start: 2017-02-10 | End: 2018-01-01 | Stop reason: SDUPTHER

## 2017-02-10 RX ORDER — AZITHROMYCIN 250 MG/1
TABLET, FILM COATED ORAL
Qty: 36 TABLET | Refills: 3 | Status: SHIPPED | OUTPATIENT
Start: 2017-02-10 | End: 2017-01-01 | Stop reason: ALTCHOICE

## 2017-03-08 DIAGNOSIS — N28.1 RENAL CYST: Primary | ICD-10-CM

## 2017-03-09 ENCOUNTER — HOSPITAL ENCOUNTER (OUTPATIENT)
Dept: RADIOLOGY | Facility: HOSPITAL | Age: 62
Discharge: HOME | End: 2017-03-09
Attending: INTERNAL MEDICINE
Payer: MEDICARE

## 2017-03-09 ENCOUNTER — OFFICE VISIT (OUTPATIENT)
Dept: TRANSPLANT | Facility: CLINIC | Age: 62
End: 2017-03-09
Payer: MEDICARE

## 2017-03-09 ENCOUNTER — HOSPITAL ENCOUNTER (OUTPATIENT)
Dept: PULMONOLOGY | Facility: CLINIC | Age: 62
Discharge: HOME | End: 2017-03-09
Payer: MEDICARE

## 2017-03-09 VITALS
HEIGHT: 67 IN | HEART RATE: 87 BPM | BODY MASS INDEX: 29.66 KG/M2 | SYSTOLIC BLOOD PRESSURE: 163 MMHG | DIASTOLIC BLOOD PRESSURE: 104 MMHG | RESPIRATION RATE: 20 BRPM | OXYGEN SATURATION: 100 % | WEIGHT: 189 LBS | TEMPERATURE: 96 F

## 2017-03-09 DIAGNOSIS — Z48.298 AFTERCARE FOLLOWING ORGAN TRANSPLANT: Primary | ICD-10-CM

## 2017-03-09 DIAGNOSIS — Z79.2 PROPHYLACTIC ANTIBIOTIC: ICD-10-CM

## 2017-03-09 DIAGNOSIS — Z94.2 LUNG REPLACED BY TRANSPLANT: ICD-10-CM

## 2017-03-09 DIAGNOSIS — D47.Z1 POST-TRANSPLANT LYMPHOPROLIFERATIVE DISORDER: ICD-10-CM

## 2017-03-09 DIAGNOSIS — D84.9 IMMUNOSUPPRESSION: ICD-10-CM

## 2017-03-09 LAB
PRE FEV1 FVC: 83
PRE FEV1: 2.02
PRE FVC: 2.43
PREDICTED FEV1 FVC: 78
PREDICTED FEV1: 2.65
PREDICTED FVC: 3.35

## 2017-03-09 PROCEDURE — 94010 BREATHING CAPACITY TEST: CPT | Mod: S$GLB,,, | Performed by: INTERNAL MEDICINE

## 2017-03-09 PROCEDURE — 3080F DIAST BP >= 90 MM HG: CPT | Mod: S$GLB,,, | Performed by: INTERNAL MEDICINE

## 2017-03-09 PROCEDURE — 3077F SYST BP >= 140 MM HG: CPT | Mod: S$GLB,,, | Performed by: INTERNAL MEDICINE

## 2017-03-09 PROCEDURE — 99214 OFFICE O/P EST MOD 30 MIN: CPT | Mod: 25,S$GLB,, | Performed by: INTERNAL MEDICINE

## 2017-03-09 PROCEDURE — 99999 PR PBB SHADOW E&M-EST. PATIENT-LVL III: CPT | Mod: PBBFAC,,, | Performed by: INTERNAL MEDICINE

## 2017-03-09 PROCEDURE — 1160F RVW MEDS BY RX/DR IN RCRD: CPT | Mod: S$GLB,,, | Performed by: INTERNAL MEDICINE

## 2017-03-09 PROCEDURE — 71020 XR CHEST PA AND LATERAL: CPT | Mod: 26,,, | Performed by: RADIOLOGY

## 2017-03-09 PROCEDURE — 99499 UNLISTED E&M SERVICE: CPT | Mod: S$GLB,,, | Performed by: INTERNAL MEDICINE

## 2017-03-09 NOTE — LETTER
April 21, 2017    Tommy Talavera MD  [address] [address]  Phone: [phone]  Fax: [phone]          Dear Dr. Ilan Pagealdjovan Lynn has reached her 5th year anniversary following lung  transplantation.  Attached is the summary of the patients most recent  assessment and plan of care.  Our lung transplant team appreciates your referral  of Lena Lynn and we look forward to continued patient collaboration with  you.     Sincerely,           Erick Nieves MD    Director, Lung Transplantation    Pulmonary & Critical Care Medicine     [facility]   [address] [place] [id number]   [phone]     /kp

## 2017-03-09 NOTE — PROGRESS NOTES
"LUNG TRANSPLANT RECIPIENT FOLLOW-UP    Reason for Visit: Follow-up after lung transplantation.                                                                                                         Date of Transplant: 4/25/12                                                                               Reason for Transplant: Hypersensitivity Pneumonitis                                                                               Type of Transplant: bilateral sequential lung                                                                               CMV Status: D+ / R+                                                                               Major Complications:   1. 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 PTLD (vertebral) diagnosed on August 2016                                                                               History of Present Illness: Lena Lynn is a 61 y.o. year old female is here for follow up. She reports doing well overall since last visit. No hospital admission or ER visit. Does not use inhalers or oxygen. Cough present which is mostly dry. No hemoptysis. Weight stable. She completed lymphoma treatment in November. No new complaints.    Review of Systems   Constitutional: Negative for chills and fever.   HENT: Negative for congestion.    Eyes: Negative for photophobia.   Respiratory: Positive for cough. Negative for hemoptysis.    Cardiovascular: Negative for chest pain and palpitations.   Gastrointestinal: Negative for nausea and vomiting.   Musculoskeletal: Negative for myalgias.   Skin: Negative for rash.   Neurological: Negative for focal weakness and headaches.   Psychiatric/Behavioral: Negative for depression.     BP (!) 163/104 (BP Location: Left arm, Patient Position: Sitting, BP Method: Automatic)  Pulse 87  Temp 96.4 °F (35.8 °C) (Oral)   Resp 20  Ht 5' 7" (1.702 m)  Wt 85.7 kg (189 lb)  LMP  (LMP Unknown)  SpO2 100%  BMI 29.6 kg/m2    Physical Exam   Constitutional: She is " oriented to person, place, and time and well-developed, well-nourished, and in no distress. No distress.   HENT:   Head: Atraumatic.   Eyes: Pupils are equal, round, and reactive to light.   Neck: Neck supple.   Cardiovascular: Normal rate and regular rhythm.    Pulmonary/Chest: Effort normal and breath sounds normal. No respiratory distress. She has no wheezes.   Abdominal: Soft. She exhibits no distension. There is no tenderness.   Musculoskeletal: Normal range of motion.   Neurological: She is alert and oriented to person, place, and time. GCS score is 15.   Skin: Skin is warm. She is not diaphoretic.   Psychiatric: Affect normal.     Labs:  cbc, cmp, tacrolimus Latest Ref Rng & Units 1/11/2017 1/23/2017 3/9/2017   TACROLIMUS LVL 5.0 - 15.0 ng/mL - 5.5 -   WHITE BLOOD CELL COUNT 3.90 - 12.70 K/uL 3.55(L) 3.90 4.84   RBC 4.00 - 5.40 M/uL 3.51(L) 3.74(L) 3.78(L)   HEMOGLOBIN 12.0 - 16.0 g/dL 11.3(L) 12.0 12.1   HEMATOCRIT 37.0 - 48.5 % 34.9(L) 37.2 36.9(L)   MCV 82 - 98 fL 99(H) 100(H) 98   MCH 27.0 - 31.0 pg 32.2(H) 32.1(H) 32.0(H)   MCHC 32.0 - 36.0 % 32.4 32.3 32.8   RDW 11.5 - 14.5 % 13.3 13.1 14.7(H)   PLATELETS 150 - 350 K/uL 139(L) 151 -   MPV 9.2 - 12.9 fL 10.6 10.5 -   GRAN # 1.8 - 7.7 K/uL 1.6(L) 2.2 -   LYMPH # 1.0 - 4.8 K/uL 1.2 1.0 -   MONO # 0.3 - 1.0 K/uL 0.4 0.4 -   EOSINOPHIL% 0.0 - 8.0 % 5.1 3.6 -   BASOPHIL% 0.0 - 1.9 % 0.6 0.3 -   DIFFERENTIAL METHOD - Automated Automated -   SODIUM 136 - 145 mmol/L - 143 -   POTASSIUM 3.5 - 5.1 mmol/L - 4.3 -   CHLORIDE 95 - 110 mmol/L - 111(H) -   CO2 23 - 29 mmol/L - 26 -   GLUCOSE 70 - 110 mg/dL - 80 -   BUN BLD 6 - 20 mg/dL - 23(H) -   CREATININE 0.5 - 1.4 mg/dL - 1.1 -   CALCIUM 8.7 - 10.5 mg/dL - 9.5 -   PROTEIN TOTAL 6.0 - 8.4 g/dL - 6.3 -   ALBUMIN 3.5 - 5.2 g/dL - 3.6 -   BILIRUBIN TOTAL 0.1 - 1.0 mg/dL - 0.3 -   ALK PHOS 55 - 135 U/L - 58 -   AST 10 - 40 U/L - 24 -   ALT 10 - 44 U/L - 21 -   ANION GAP 8 - 16 mmol/L - 6(L) -   EGFR IF AFRICAN AMERICAN  >60 mL/min/1.73 m:2 - >60.0 -   EGFR IF NON-AFRICAN AMERICAN >60 mL/min/1.73 m:2 - 54.7(A) -     Pulmonary Function Tests 3/9/2017 1/23/2017 12/8/2016 10/24/2016 10/17/2016 10/6/2016 9/22/2016   FVC 2.43 2.47 2.46 2.62 2.71 2.48 2.41   FEV1 2.02 2.07 2.04 2.18 2.34 2.06 2.06   FVC% 73 75 74 79 81 74 72   FEV1% 76 78 77 82 88 77 77   FEF 25-75 2.27 2.37 2.35 2.49 3.1 2.4 2.5   FEF 25-75% 87 91 89 94 117 90 94       Imaging:  Results for orders placed during the hospital encounter of 03/09/17   X-Ray Chest PA And Lateral    Narrative 2 views    Postoperative changes in the thorax.  Central venous catheter in SVC.  Mild cardiomegaly.  The lungs are clear.  Compression deformity of one of the mid thoracic vertebral body identified as before    Impression  See above      Electronically signed by: Lenard Corral MD  Date:     03/09/17  Time:    09:06        Assessment:  1. Aftercare following organ transplant    2. Lung replaced by transplant    3. Immunosuppression    4. Prophylactic antibiotic    5. Post-transplant lymphoproliferative disorder      Plan:     1. CXR and PFTs reviewed. PFT shows stable FEV1. CXR does not show acute abnormality today. CT scan reviewed from 12/2016. Will cont to follow PFTs periodically.  2.  Continue current immunosuppressant- prograf and prednisone. Will adjust dose if needed  3. Continue bactrim  4. She developed vertebral lymphoma and completed therapy. Patient advised to continue heme-onc follow up      Return to clinic in 3 months    Gregorio Spears MD  [facility] fellow    Attending Note:    I have seen and evaluated the patient with the fellow. Their note reflects the content of our discussion and my plan of care.      Erick Nieves MD  Pulmonary/Critical Care Medicine

## 2017-03-13 DIAGNOSIS — Z94.2 LUNG REPLACED BY TRANSPLANT: ICD-10-CM

## 2017-03-13 RX ORDER — TACROLIMUS 0.5 MG/1
0.5 CAPSULE ORAL EVERY 12 HOURS
Qty: 90 CAPSULE | Refills: 3 | Status: SHIPPED | OUTPATIENT
Start: 2017-03-13 | End: 2017-01-01 | Stop reason: SDUPTHER

## 2017-03-13 RX ORDER — TACROLIMUS 1 MG/1
1 CAPSULE ORAL EVERY 12 HOURS
Qty: 180 CAPSULE | Refills: 3 | Status: SHIPPED | OUTPATIENT
Start: 2017-03-13 | End: 2017-01-01 | Stop reason: SDUPTHER

## 2017-03-13 NOTE — TELEPHONE ENCOUNTER
Received written order from Dr. Nieves to increase Prograf dose to 1..5 mg every 12 hours (from 1 mg in am, 0.5 mg in pm); repeat Prograf level and BMP next week.  Contacted patient, confirmed that the Prograf level is an accurate 12 hour trough and gave her dose change instructions which she stated she had written down and was able to repeat back correctly. Will have repeat lab work on 3/20/17.

## 2017-03-14 ENCOUNTER — HOSPITAL ENCOUNTER (OUTPATIENT)
Dept: RADIOLOGY | Facility: HOSPITAL | Age: 62
Discharge: HOME | End: 2017-03-14
Attending: INTERNAL MEDICINE
Payer: MEDICARE

## 2017-03-14 DIAGNOSIS — N28.1 RENAL CYST: ICD-10-CM

## 2017-03-14 PROCEDURE — 76770 US EXAM ABDO BACK WALL COMP: CPT | Mod: TC

## 2017-03-14 PROCEDURE — 76770 US EXAM ABDO BACK WALL COMP: CPT | Mod: 26,,, | Performed by: RADIOLOGY

## 2017-03-20 ENCOUNTER — LAB VISIT (OUTPATIENT)
Dept: LAB | Facility: HOSPITAL | Age: 62
End: 2017-03-20
Attending: INTERNAL MEDICINE
Payer: MEDICARE

## 2017-03-20 DIAGNOSIS — Z94.2 LUNG REPLACED BY TRANSPLANT: ICD-10-CM

## 2017-03-20 LAB
ANION GAP SERPL CALC-SCNC: 10 MMOL/L
BUN SERPL-MCNC: 26 MG/DL
CALCIUM SERPL-MCNC: 9.7 MG/DL
CHLORIDE SERPL-SCNC: 108 MMOL/L
CO2 SERPL-SCNC: 24 MMOL/L
CREAT SERPL-MCNC: 1.3 MG/DL
EST. GFR  (AFRICAN AMERICAN): 51.5 ML/MIN/1.73 M^2
EST. GFR  (NON AFRICAN AMERICAN): 44.7 ML/MIN/1.73 M^2
GLUCOSE SERPL-MCNC: 76 MG/DL
POTASSIUM SERPL-SCNC: 4.1 MMOL/L
SODIUM SERPL-SCNC: 142 MMOL/L

## 2017-03-20 PROCEDURE — 36415 COLL VENOUS BLD VENIPUNCTURE: CPT | Mod: PO

## 2017-03-20 PROCEDURE — 80048 BASIC METABOLIC PNL TOTAL CA: CPT

## 2017-03-20 PROCEDURE — 80197 ASSAY OF TACROLIMUS: CPT

## 2017-03-21 LAB — TACROLIMUS BLD-MCNC: 7.4 NG/ML

## 2017-04-04 ENCOUNTER — PATIENT MESSAGE (OUTPATIENT)
Dept: HEMATOLOGY/ONCOLOGY | Facility: CLINIC | Age: 62
End: 2017-04-04

## 2017-04-04 DIAGNOSIS — G89.3 CANCER ASSOCIATED PAIN: ICD-10-CM

## 2017-04-04 DIAGNOSIS — G47.00 INSOMNIA: ICD-10-CM

## 2017-04-04 RX ORDER — HYDROCODONE BITARTRATE AND ACETAMINOPHEN 5; 325 MG/1; MG/1
1 TABLET ORAL EVERY 6 HOURS PRN
Qty: 30 TABLET | Refills: 0 | Status: SHIPPED | OUTPATIENT
Start: 2017-04-04 | End: 2018-01-01 | Stop reason: SDUPTHER

## 2017-04-04 RX ORDER — TRAMADOL HYDROCHLORIDE 50 MG/1
50 TABLET ORAL EVERY 6 HOURS PRN
Qty: 90 TABLET | Refills: 3 | Status: SHIPPED | OUTPATIENT
Start: 2017-04-04 | End: 2017-01-01 | Stop reason: SDUPTHER

## 2017-04-04 RX ORDER — TRAZODONE HYDROCHLORIDE 50 MG/1
TABLET ORAL
Qty: 90 TABLET | Refills: 1 | Status: SHIPPED | OUTPATIENT
Start: 2017-04-04 | End: 2018-01-01 | Stop reason: SDUPTHER

## 2017-04-05 ENCOUNTER — PATIENT MESSAGE (OUTPATIENT)
Dept: TRANSPLANT | Facility: CLINIC | Age: 62
End: 2017-04-05

## 2017-04-24 NOTE — Clinical Note
Return visit in 3 months for post-lymphoma treatment surveillance with CBC, CMP, and LDH Amb referall to orthopedic surgery

## 2017-04-24 NOTE — PROGRESS NOTES
Subjective:       Patient ID: Lena Lynn is a 61 y.o. female.    Chief Complaint: No chief complaint on file.  Hematology/Hospital History  60-year-old status post lung transplant (in 2012 2/2 hypersensitivity pneumonitis, complicated by A2 rejection in 2013, on chronic pred/tacro), presented with worsening back pain x3 months, found to have a compression fracture at L4 and several lytic lesions in the left sacrum, and also T8, consistent with probable malignancy per CT chest abdomen and pelvis without contrast 8/3/16. S/p bx of T8 lesion -> DLBCL. Transferred to BMT service for treatment. Course complicated by CARROLL likely secondary to tumor lysis syndrome. She was transferred to ICU with volume overload and fever meeting severe sepsis criteria. Patient was placed on broad spectrum antibiotics. Cultures no growth to date. Received rasburicase and was placed on allopurinol. Kidney function improved. Patient was given R-CHOP 8/14-8/15. Bone marrow biopsy and LP with IT MTX were performed 8/12/16. CSF does not appear have involvement with lymphoma and bone marrow biopsy results are pending. Patient resumed prophylactic bactrim MWF, placed on cipro prophylaxis. Continued on azithromycin ppx. Lung tx managing immunosuppression with tacrolimus and prednisone.Received neulasta 8/17/16.     TODAY  Lena presents for follow-up today after completing RCHOP chemotherapy for PTLD/DLBCL. PET imaging in December 2016 at completion of therapy had NEYMAR. CBC/CMP is stable from March 2017.  She reports recovering well after chemotherapy. Her hair is growing again slowly. She complains of central, thoracic back pain.  She has a compression fracture of L4 on imaging from August 2016 and compression fracture at T8 from CT of chest in December 2016.     Fatigue   Pertinent negatives include no fatigue.     Review of Systems   Constitutional: Negative for appetite change and fatigue.   HENT: Negative.    Eyes: Negative.     Respiratory: Negative.    Cardiovascular: Negative.    Gastrointestinal: Negative.    Endocrine: Negative for cold intolerance.   Genitourinary: Negative.    Musculoskeletal: Positive for back pain.        Left hip pain   Skin: Negative.    Allergic/Immunologic: Positive for immunocompromised state. Negative for environmental allergies and food allergies.   Neurological: Negative.    Hematological: Negative for adenopathy. Does not bruise/bleed easily.   Psychiatric/Behavioral: Negative.        Objective:      Physical Exam   Constitutional: She is oriented to person, place, and time. She appears well-developed and well-nourished.   HENT:   Head: Normocephalic and atraumatic.   Mouth/Throat: No oropharyngeal exudate.   Eyes: Conjunctivae are normal.   Neck: Normal range of motion. Neck supple.   Cardiovascular: Normal rate, regular rhythm, normal heart sounds and intact distal pulses.    No murmur heard.  Pulmonary/Chest: Effort normal. No respiratory distress.   Abdominal: She exhibits no distension. There is no tenderness.   Musculoskeletal: She exhibits no edema.        Thoracic back: She exhibits tenderness and pain.   Neurological: She is alert and oriented to person, place, and time.   Skin: Skin is warm and dry.   Psychiatric: She has a normal mood and affect. Her behavior is normal. Judgment and thought content normal.   Nursing note and vitals reviewed.      Assessment:       1. Diffuse large B-cell lymphoma of lymph nodes of multiple regions        Plan:       1. DLBCL  Completed 6 cycles of RCHOP chemotherapy   PET at end of treatment NEYMAR    2. Back Pain  Symptomatic compression fracture at T8  Refer to Orthopedic surgery for evaluation    3. Renal  Creatinine 1.3  Tacrolimus per lung transplant     Return visit in 3 months for post-lymphoma treatment surveillance with CBC, CMP, and LDH

## 2017-04-28 NOTE — TELEPHONE ENCOUNTER
Spoke to pt regarding appt Monday 5/1/17 at 830a with Irene Horton PA-C. Pt was informed to arrive on the 2nd floor at 8a, then up to floor 5 to see Irene. Pt verbalized understanding. Phone number was provided for any further questions.    Hattie TAVERA

## 2017-05-01 NOTE — LETTER
May 1, 2017      Leyda Campa MD  [address] [address]           [facility]  [address]  [facility] [address]  Phone: [phone]          Patient: Lena Lynn   MR Number: [id number]   YOB: 1955   Date of Visit: 5/1/2017       Dear Dr. Leyda Campa:    Thank you for referring Lena Lynn to me for evaluation. Attached you will find relevant portions of my assessment and plan of care.    If you have questions, please do not hesitate to call me. I look forward to following Lena Lynn along with you.    Sincerely,    Irene Horton PA-C    Enclosure  CC:  No Recipients    If you would like to receive this communication electronically, please contact [email or web address] or [phone] to request more information on [employer] [facility] access.    For providers and/or their staff who would like to refer a patient to [facility], please contact us through our one-stop-shop provider referral line, Elayne Tabares, at [phone].    If you feel you have received this communication in error or would no longer like to receive these types of communications, please e-mail [email or web address]

## 2017-05-01 NOTE — PROGRESS NOTES
DATE: 5/1/2017  PATIENT: Lena Lynn    Supervising Physician: Harrison Parmar M.D.    CHIEF COMPLAINT: back pain    HISTORY:  Lena Lynn is a 61 y.o. female s/p lung transplant in 2012, with PMH of lymphoma diagnosed in August 2016, s/p chemo completed November 2016  here for initial evaluation of thoracic back pain (Back - 9, Leg - 0).  The pain is located around the bra line.  The pain has been present for about a year.  She presented to the ED in August 2016 for a 3 month history of worsening back pain.  CT at that time showed lytic lesions in the left sacrum, T8 and compression fracture of L4 and T8.  Biopsy of the T8 lesion was positive for large B cell lymphoma.  She completed chemo in November 2016.  NM PET CT scan from 12/2016 shows no evidence of recurrent or metastatic disease.  She continues to have thoracic back pain.  The patient describes the pain as aching and throbbing.  The pain is worse with prolonged standing, cooking, doing chores around the house and improved by sitting. There is no associated numbness and tingling. There is subjective weakness since her lung transplant. Prior treatments have included tramadol and hydrocodone, but no physical therapy, ESIs or surgery.    The patient denies myelopathic symptoms such as handwriting changes or difficulty with buttons/coins/keys. Denies perineal paresthesias, bowel/bladder dysfunction.    PAST MEDICAL/SURGICAL HISTORY:  Past Medical History:   Diagnosis Date    Acute rejection of lung transplant 12/12/2013    CARROLL (acute kidney injury)     Anemia 8/2/2016    Anxiety     Back pain 2016    Recently seen in ED for back pain    Cirrhosis     Depression     Hyperlipidemia     Hypertension     Lung transplant complication 1/10/2014    Lymphoma 8/10/2016    Obesity     DOROTHY (obstructive sleep apnea)     Osteoporosis     Postinflammatory pulmonary fibrosis      Past Surgical History:   Procedure Laterality Date    APPENDECTOMY       Removed at the time of hysterectomy    HYSTERECTOMY      LUNG TRANSPLANT Bilateral 04/25/2012       Medications:   Current Outpatient Prescriptions on File Prior to Visit   Medication Sig Dispense Refill    alendronate (FOSAMAX) 70 MG tablet TAKE 1 TABLET EVERY 7 DAYS 12 tablet 3    aspirin (ECOTRIN) 81 MG EC tablet Take 1 tablet (81 mg total) by mouth once daily. 90 tablet 3    azithromycin (Z-ROYER) 250 MG tablet TAKE 1 TABLET EVERY MONDAY, WEDNESDAY AND FRIDAY 36 tablet 3    calcium carbonate (OS-JERONIMO) 600 mg (1,500 mg) Tab Take 1 tablet (600 mg total) by mouth 2 (two) times daily with meals. 180 tablet 3    citalopram (CELEXA) 20 MG tablet TAKE 1 TABLET EVERY DAY 90 tablet 3    diphenhydrAMINE (BENADRYL) 25 mg capsule Take 50 mg by mouth nightly as needed.       hydrocodone-acetaminophen 5-325mg (NORCO) 5-325 mg per tablet Take 1 tablet by mouth every 6 (six) hours as needed for Pain. 30 tablet 0    lorazepam (ATIVAN) 1 MG tablet Take 1 tablet (1 mg total) by mouth every 12 (twelve) hours as needed for Anxiety. 10 tablet 0    magnesium oxide (MAG-OX) 400 mg tablet Take 1 tablet (400 mg total) by mouth 2 (two) times daily. 180 tablet 3    multivitamin (MULTIVITAMIN) per tablet Take 1 tablet by mouth once daily.        omeprazole (PRILOSEC) 40 MG capsule Take 1 capsule (40 mg total) by mouth once daily. 90 capsule 3    ondansetron (ZOFRAN-ODT) 8 MG TbDL Take 1 tablet (8 mg total) by mouth every 8 (eight) hours as needed. 30 tablet 3    predniSONE (DELTASONE) 5 MG tablet TAKE 1 TABLET ONE TIME DAILY 90 tablet 3    sulfamethoxazole-trimethoprim 800-160mg (BACTRIM DS) 800-160 mg Tab Take 1 tablet by mouth every Mon, Wed, Fri. 36 tablet 3    tacrolimus (PROGRAF) 0.5 MG Cap Take 1 capsule (0.5 mg total) by mouth every 12 (twelve) hours. 90 capsule 3    tacrolimus (PROGRAF) 1 MG Cap Take 1 capsule (1 mg total) by mouth every 12 (twelve) hours. Daily doses: 1.5 mg every 12 hours 180 capsule 3    tramadol  "(ULTRAM) 50 mg tablet Take 1 tablet (50 mg total) by mouth every 6 (six) hours as needed for Pain. 90 tablet 0    trazodone (DESYREL) 50 MG tablet Please provide 3 months supply  I pill PRN QHS Insomnia 90 tablet 1     No current facility-administered medications on file prior to visit.        Social History:   Social History     Social History    Marital status: Single     Spouse name: N/A    Number of children: N/A    Years of education: N/A     Occupational History    Not on file.     Social History Main Topics    Smoking status: Never Smoker    Smokeless tobacco: Never Used    Alcohol use No    Drug use: No    Sexual activity: Not Currently     Other Topics Concern    Not on file     Social History Narrative       REVIEW OF SYSTEMS:  Constitution: Negative. Negative for chills, fever and night sweats.   Cardiovascular: Negative for chest pain and syncope.   Respiratory: Negative for cough and shortness of breath.   Gastrointestinal: See HPI. Negative for nausea/vomiting. Negative for abdominal pain.  Genitourinary: See HPI. Negative for discoloration or dysuria.  Skin: Negative for dry skin, itching and rash.   Hematologic/Lymphatic: Negative for bleeding problem. Does not bruise/bleed easily.   Musculoskeletal: Negative for falls and muscle weakness.   Neurological: See HPI. No seizures.   Endocrine: Negative for polydipsia, polyphagia and polyuria.   Allergic/Immunologic: Negative for hives and persistent infections.     EXAM:  BP (!) 131/95 (BP Location: Right arm, Patient Position: Sitting, BP Method: Automatic)  Pulse 97  Ht 5' 7" (1.702 m)  Wt 86.3 kg (190 lb 2.4 oz)  LMP  (LMP Unknown)  BMI 29.78 kg/m2    General: The patient is a very pleasant 61 y.o. female in no apparent distress, the patient is oriented to person, place and time.  Psych: Normal mood and affect  HEENT: Vision grossly intact, hearing intact to the spoken word.  Lungs: Respirations unlabored.  Gait: Normal station and " gait, no difficulty with toe or heel walk.   Skin: Dorsal cervical and lumbar skin negative for rashes, lesions, hairy patches and surgical scars. There is mild thoracic tenderness to palpation, no cervical or lumbar tenderness to palpation.  Range of motion: Lumbar and cervical range of motion is acceptable.  Spinal Balance: Global saggital and coronal spinal balance acceptable, not significant for scoliosis.  There is mild thoracic kyphosis.  Musculoskeletal: No pain with the range of motion of the bilateral hips. No trochanteric tenderness to palpation.  Vascular: Bilateral lower extremities warm and well perfused, dorsalis pedis and radial pulses 2+ bilaterally.  Neurological: Normal strength and tone in all major motor groups in the bilateral upper and lower extremities. Normal sensation to light touch in the C5-T1 and L2-S1 dermatomes bilaterally.  Deep tendon reflexes symmetric 1+ in the bilateral lower extremities.  Negative Babinski bilaterally. Straight leg raise negative bilaterally.  Negative inverted radial reflex and cifuentes's bilaterally.    IMAGING:      Today I personally reviewed AP, Lat and Flex/Ex  upright L-spine films that demonstrate mild degenerative changes.  There is compression of the L4 vertebral body, stable from previous CT 8/2/2016.     CT lumbar from 8/2/2016 shows compression of L4 as well as expansile lytic lesion in the left sacral bone.     Xray chest from 3/9/2017 shows compression of T8, unchanged from previous imaging.    CT chest from 12/8/2016 shows severe compression of T8, stable from previous imaging.    NM PET CT from 12/16/2016 shows no evidence of recurrent or metastatic disease.    ASSESSMENT/PLAN:    Diagnoses and all orders for this visit:    Compression fx, thoracic spine, closed, initial encounter  -     Ambulatory Referral to Physical/Occupational Therapy      Discussed with Dr. Parmar.  No indication for surgical intervention at this time.  Referral for PT at  [facility] in [facility] today.  Follow up after therapy in about 6 weeks if symptoms persist.       Return in about 6 weeks (around 6/12/2017), or if symptoms worsen or fail to improve.

## 2017-06-15 NOTE — PROGRESS NOTES
LUNG TRANSPLANT RECIPIENT FOLLOW-UP    Reason for Visit: Follow-up after lung transplantation.                               Date of Transplant: 4/25/12     Reason for Transplant: Hypersensitivity Pneumonitis     Type of Transplant: bilateral sequential lung     CMV Status: D+ / R+      Major Complications:   1. 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 PTLD (vertebral) diagnosed on August 2016                                                                               History of Present Illness: Lena Lynn is a 61 y.o. year old female with the above listed transplant history who presents for routine follow-up.  Today, states she has been doing well.  No respiratory complaints.  Denies any cough, recent illnesses, sick contacts, or hospitalizations.  Has had a few days of clear sinus drainage.  Has not taken any medications for it.  Denies cough, fever, chills, nightsweats.  Continues to follow-up with Oncology for her vertebral PTLD and has completed 6 cycles of RCHOP therapy.  Overall, doing very well today.    Review of Systems   Constitutional: Negative for chills, fever, malaise/fatigue and weight loss.   HENT: Negative for congestion, nosebleeds and sore throat.    Eyes: Negative for blurred vision, double vision and photophobia.   Respiratory: Negative for cough, hemoptysis, sputum production, shortness of breath and wheezing.    Cardiovascular: Negative for chest pain, palpitations and leg swelling.   Gastrointestinal: Negative for heartburn, nausea and vomiting.   Genitourinary: Negative for dysuria and hematuria.   Musculoskeletal: Negative for joint pain and myalgias.   Skin: Negative for rash.   Neurological: Negative for dizziness, tingling, focal weakness and headaches.   Psychiatric/Behavioral: Negative for depression. The patient is not nervous/anxious and does not have insomnia.      /82 (BP Location: Left arm, Patient Position: Sitting, BP Method: Automatic)   Pulse 100   Temp 96.9 °F (36.1 °C)  "(Oral)   Resp 20   Ht 5' 7" (1.702 m)   Wt 88.9 kg (196 lb)   LMP  (LMP Unknown)   SpO2 97%   BMI 30.70 kg/m²     Physical Exam   Constitutional: She is oriented to person, place, and time and well-developed, well-nourished, and in no distress. No distress.   HENT:   Head: Normocephalic and atraumatic.   Mouth/Throat: Oropharynx is clear and moist. No oropharyngeal exudate.   Eyes: Conjunctivae are normal. Pupils are equal, round, and reactive to light. No scleral icterus.   Neck: Neck supple. No JVD present. No tracheal deviation present. No thyromegaly present.   Cardiovascular: Normal rate, regular rhythm and normal heart sounds.  Exam reveals no gallop and no friction rub.    No murmur heard.  Pulmonary/Chest: Effort normal and breath sounds normal. No stridor. No respiratory distress. She has no wheezes. She has no rales.   Abdominal: Soft. She exhibits no distension. There is no tenderness.   Musculoskeletal: Normal range of motion. She exhibits no edema.   Lymphadenopathy:     She has no cervical adenopathy.   Neurological: She is alert and oriented to person, place, and time. GCS score is 15.   Skin: Skin is warm. No rash noted. She is not diaphoretic. No erythema. No pallor.   Psychiatric: Mood and affect normal.     Labs:  cbc, cmp, tacrolimus Latest Ref Rng & Units 3/9/2017 3/20/2017 6/15/2017   TACROLIMUS LVL 5.0 - 15.0 ng/mL 3.6(L) 7.4 -   WHITE BLOOD CELL COUNT 3.90 - 12.70 K/uL 4.84 - 6.94   RBC 4.00 - 5.40 M/uL 3.78(L) - 3.71(L)   HEMOGLOBIN 12.0 - 16.0 g/dL 12.1 - 12.4   HEMATOCRIT 37.0 - 48.5 % 36.9(L) - 38.0   MCV 82 - 98 fL 98 - 102(H)   MCH 27.0 - 31.0 pg 32.0(H) - 33.4(H)   MCHC 32.0 - 36.0 % 32.8 - 32.6   RDW 11.5 - 14.5 % 14.7(H) - 13.2   PLATELETS 150 - 350 K/uL 145(L) - 169   MPV 9.2 - 12.9 fL 10.6 - 9.9   GRAN # 1.8 - 7.7 K/uL 3.2 - 4.1   LYMPH # 1.0 - 4.8 K/uL 1.0 - 1.8   MONO # 0.3 - 1.0 K/uL 0.6 - 0.8   EOSINOPHIL% 0.0 - 8.0 % 2.7 - 2.3   BASOPHIL% 0.0 - 1.9 % 0.2 - 0.3 "   DIFFERENTIAL METHOD - Automated - Automated   SODIUM 136 - 145 mmol/L 143 142 140   POTASSIUM 3.5 - 5.1 mmol/L 4.0 4.1 3.9   CHLORIDE 95 - 110 mmol/L 108 108 105   CO2 23 - 29 mmol/L 25 24 26   GLUCOSE 70 - 110 mg/dL 65(L) 76 74   BUN BLD 6 - 20 mg/dL 20 26(H) 17   CREATININE 0.5 - 1.4 mg/dL 1.2 1.3 1.4   CALCIUM 8.7 - 10.5 mg/dL 9.4 9.7 9.6   PROTEIN TOTAL 6.0 - 8.4 g/dL 6.6 - 6.0   ALBUMIN 3.5 - 5.2 g/dL 3.9 - 3.5   BILIRUBIN TOTAL 0.1 - 1.0 mg/dL 0.5 - 0.6   ALK PHOS 55 - 135 U/L 53(L) - 49(L)   AST 10 - 40 U/L 24 - 18   ALT 10 - 44 U/L 24 - 12   ANION GAP 8 - 16 mmol/L 10 10 9   EGFR IF AFRICAN AMERICAN >60 mL/min/1.73 m:2 56.8(A) 51.5(A) 47.1(A)   EGFR IF NON-AFRICAN AMERICAN >60 mL/min/1.73 m:2 49.2(A) 44.7(A) 40.9(A)     Pulmonary Function Tests 6/15/2017 3/9/2017 1/23/2017 12/8/2016 10/24/2016 10/17/2016 10/6/2016   FVC 2.39 2.43 2.47 2.46 2.62 2.71 2.48   FEV1 2.05 2.02 2.07 2.04 2.18 2.34 2.06   FVC% 72 73 75 74 79 81 74   FEV1% 78 76 78 77 82 88 77   FEF 25-75 2.53 2.27 2.37 2.35 2.49 3.1 2.4   FEF 25-75% 97 87 91 89 94 117 90       Imaging:  Results for orders placed during the hospital encounter of 06/15/17   X-Ray Chest PA And Lateral    Narrative PA and lateral views of the chest were obtained.  Comparison -- 3/9/17. Postoperative changes from lung transplantation are again identified. Right central venous catheter remains in place. Heart size is normal. Mediastinal contour is unremarkable. The lungs are expanded and clear. No lung consolidation or pleural effusion is identified. The skeletal structures again show sternotomy wires and a severe compression fracture in the midthoracic spine that is stable with associated kyphosis.    Impression  Status post lung transplant. No acute process.      Electronically signed by: SUNDEEP PADILLA MD  Date:     06/15/17  Time:    09:03        Assessment:  1. Aftercare following organ transplant    2. Immunosuppression    3. Prophylactic antibiotic    4. PTLD  after lung transplantation    5. CKD (chronic kidney disease), stage 3 (moderate)      Plan:   1. FEV remains stable today despite having an overall decrease in lung function after her diagnosis of PTLD and initiation of chemotherapy--mild graft dysfunction.  Symptomatically doing very well and CXR unchanged.      2. Continue with Tacrolimus and Prednisone.  Will follow-up Tacrolimus level and adjust if needed.    3. Continue with Bactrim and Azithromycin.      4. Continues to follow with Oncology for PTLD.  Has completed 6 cycles of RCHOP and has been doing very well.    5. Creatinine stable at 1.4 today.  Will continue to monitor.    6. Follow-up in 3 months or earlier if needed.    Shavon Batres DO  PCCM Fellow  [facility]     Attending Note:    I have seen and evaluated the patient with the fellow. Their note reflects the content of our discussion and my plan of care.      Erick Nieves MD  Pulmonary/Critical Care Medicine

## 2017-07-27 NOTE — Clinical Note
Return visit in 3 months for post-lymphoma treatment surveillance with CBC, CMP, and LDH CMP in 4 weeks

## 2017-07-29 NOTE — PROGRESS NOTES
Subjective:       Patient ID: Lena Lynn is a 61 y.o. female.    Chief Complaint: Lymphoma  Hematology/Hospital History  60-year-old status post lung transplant (in 2012 2/2 hypersensitivity pneumonitis, complicated by A2 rejection in 2013, on chronic pred/tacro), presented with worsening back pain x3 months, found to have a compression fracture at L4 and several lytic lesions in the left sacrum, and also T8, consistent with probable malignancy per CT chest abdomen and pelvis without contrast 8/3/16. S/p bx of T8 lesion -> DLBCL. Transferred to BMT service for treatment. Course complicated by CARROLL likely secondary to tumor lysis syndrome. She was transferred to ICU with volume overload and fever meeting severe sepsis criteria. Patient was placed on broad spectrum antibiotics. Cultures no growth to date. Received rasburicase and was placed on allopurinol. Kidney function improved. Patient was given R-CHOP 8/14-8/15. Bone marrow biopsy and LP with IT MTX were performed 8/12/16. CSF does not appear have involvement with lymphoma and bone marrow biopsy results are pending. Patient resumed prophylactic bactrim MWF, placed on cipro prophylaxis. Continued on azithromycin ppx. Lung tx managing immunosuppression with tacrolimus and prednisone.Received neulasta 8/17/16.     TODAY  Lena presents for follow-up today after completing RCHOP chemotherapy for PTLD/DLBCL. PET imaging in December 2016 at completion of therapy had NEYMAR. CBC/CMP is stable from March 2017.  She reports recovering well after chemotherapy. Her hair is growing slowly and remains bald in some areas which upsets Amelia. She continues to have some back pain.  ROS is negative for B symptoms. CBC is stable CMP has increase in Creatinine    Fatigue   Pertinent negatives include no fatigue.     Review of Systems   Constitutional: Negative for appetite change and fatigue.   HENT: Negative.    Eyes: Negative.    Respiratory: Negative.    Cardiovascular:  Negative.    Gastrointestinal: Negative.    Endocrine: Negative for cold intolerance.   Genitourinary: Negative.    Musculoskeletal: Positive for back pain.        Left hip pain   Skin: Negative.    Allergic/Immunologic: Positive for immunocompromised state. Negative for environmental allergies and food allergies.   Neurological: Negative.    Hematological: Negative for adenopathy. Does not bruise/bleed easily.   Psychiatric/Behavioral: Negative.        Objective:      Physical Exam   Constitutional: She is oriented to person, place, and time. She appears well-developed and well-nourished.   HENT:   Head: Normocephalic and atraumatic.   Mouth/Throat: No oropharyngeal exudate.   Eyes: Conjunctivae are normal.   Neck: Normal range of motion. Neck supple.   Cardiovascular: Normal rate, regular rhythm, normal heart sounds and intact distal pulses.    No murmur heard.  Pulmonary/Chest: Effort normal. No respiratory distress.   Abdominal: She exhibits no distension. There is no tenderness.   Musculoskeletal: She exhibits no edema.        Thoracic back: She exhibits tenderness and pain.   Neurological: She is alert and oriented to person, place, and time.   Skin: Skin is warm and dry.   Psychiatric: She has a normal mood and affect. Her behavior is normal. Judgment and thought content normal.   Nursing note and vitals reviewed.      Assessment:       1. Diffuse large B-cell lymphoma of lymph nodes of multiple regions    2. Compression fracture of thoracic vertebra, closed, initial encounter    3. History of lung transplant        Plan:       1. DLBCL  Completed 6 cycles of RCHOP chemotherapy   PET at end of treatment NEYMAR    2. Back Pain  Symptomatic compression fracture at T8  Refer to Orthopedic surgery for evaluation    3. Renal  Creatinine 1.7- repeat in 1 month  Tacrolimus per lung transplant     Return visit in 3 months for post-lymphoma treatment surveillance with CBC, CMP, and LDH

## 2017-09-11 NOTE — PROGRESS NOTES
"LUNG TRANSPLANT RECIPIENT FOLLOW-UP     Reason for Visit: Follow-up after lung transplantation.                               Date of Transplant: 4/25/12     Reason for Transplant: Hypersensitivity Pneumonitis     Type of Transplant: bilateral sequential lung     CMV Status: D+ / R+      Major Complications:   1. 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 PTLD (vertebral) diagnosed on August 2016                                                                               History of Present Illness: Lena Lynn is a 61 y.o. year old female with the above listed transplant history who presents for routine follow up (last seen 6/17).  She states she feels "good", no fevers, chills, cough, wheezing, sputum proudction, or weight changes.  She has not had any hospitalizations or been around any sick contacts since her last visit.      In regards to her Oncology history, she has a history of PTLD/DBCL s/p 6 cyles of RCHOP (last treatment 11/16).  She continues to regularly follow up with Hem/Onc    Review of Systems   Constitutional: Negative.    HENT: Negative.    Eyes: Negative.    Respiratory: Negative.    Cardiovascular: Negative.    Gastrointestinal: Negative.    Genitourinary: Negative.    Musculoskeletal: Negative.    Skin: Negative.    Neurological: Negative.    Endo/Heme/Allergies: Negative.    Psychiatric/Behavioral: Negative.        /88 (BP Location: Left arm, Patient Position: Sitting, BP Method: Large (Automatic))   Pulse 94   Temp 96.9 °F (36.1 °C) (Oral)   Resp 20   Ht 5' 7" (1.702 m)   Wt 88.5 kg (195 lb)   LMP  (LMP Unknown)   SpO2 99%   BMI 30.54 kg/m²     Physical Exam   Constitutional: She is oriented to person, place, and time and well-developed, well-nourished, and in no distress.   HENT:   Head: Normocephalic and atraumatic.   Eyes: Conjunctivae and EOM are normal. Pupils are equal, round, and reactive to light.   Cardiovascular: Normal rate and regular rhythm.    Pulmonary/Chest: Effort normal and " breath sounds normal.   Abdominal: Soft. Bowel sounds are normal.   Musculoskeletal: Normal range of motion.   Neurological: She is alert and oriented to person, place, and time.   Skin: Skin is warm and dry.   Psychiatric: Affect normal.      Labs:  cbc, cmp, tacrolimus Latest Ref Rng & Units 7/27/2017 8/24/2017 9/11/2017   TACROLIMUS LVL 5.0 - 15.0 ng/mL - - -   WHITE BLOOD CELL COUNT 3.90 - 12.70 K/uL 6.61 - 5.76   RBC 4.00 - 5.40 M/uL 3.60(L) - 3.68(L)   HEMOGLOBIN 12.0 - 16.0 g/dL 12.5 - 12.6   HEMATOCRIT 37.0 - 48.5 % 36.4(L) - 36.5(L)   MCV 82 - 98 fL 101(H) - 99(H)   MCH 27.0 - 31.0 pg 34.7(H) - 34.2(H)   MCHC 32.0 - 36.0 g/dL 34.3 - 34.5   RDW 11.5 - 14.5 % 13.2 - 13.6   PLATELETS 150 - 350 K/uL 185 - 188   MPV 9.2 - 12.9 fL 9.6 - 9.5   GRAN # 1.8 - 7.7 K/uL 4.0 - 3.0   LYMPH # 1.0 - 4.8 K/uL 1.6 - 1.8   MONO # 0.3 - 1.0 K/uL 0.7 - 0.7   EOSINOPHIL% 0.0 - 8.0 % 2.3 - 3.1   BASOPHIL% 0.0 - 1.9 % 0.3 - 0.3   DIFFERENTIAL METHOD - Automated - Automated   SODIUM 136 - 145 mmol/L 140 143 141   POTASSIUM 3.5 - 5.1 mmol/L 3.7 4.0 3.8   CHLORIDE 95 - 110 mmol/L 106 107 105   CO2 23 - 29 mmol/L 23 27 29   GLUCOSE 70 - 110 mg/dL 100 79 86   BUN BLD 6 - 20 mg/dL 19 19 29(H)   CREATININE 0.5 - 1.4 mg/dL 1.7(H) 1.6(H) 1.5(H)   CALCIUM 8.7 - 10.5 mg/dL 9.7 9.5 9.2   PROTEIN TOTAL 6.0 - 8.4 g/dL 6.3 6.3 6.3   ALBUMIN 3.5 - 5.2 g/dL 3.8 3.7 3.5   BILIRUBIN TOTAL 0.1 - 1.0 mg/dL 0.4 0.5 0.5   ALK PHOS 55 - 135 U/L 52(L) 55 56   AST 10 - 40 U/L 22 25 18   ALT 10 - 44 U/L 17 17 15   ANION GAP 8 - 16 mmol/L 11 9 7(L)   EGFR IF AFRICAN AMERICAN >60 mL/min/1.73 m:2 37.3(A) 40.1(A) 43.3(A)   EGFR IF NON-AFRICAN AMERICAN >60 mL/min/1.73 m:2 32.3(A) 34.8(A) 37.6(A)     Pulmonary Function Tests 9/11/2017 6/15/2017 3/9/2017 1/23/2017 12/8/2016 10/24/2016 10/17/2016   FVC 2.46 2.39 2.43 2.47 2.46 2.62 2.71   FEV1 2.1 2.05 2.02 2.07 2.04 2.18 2.34   FEV1/FVC - - - - - - -   TLC (liters) - - - - - - -   DLCO (ml/mmHg sec) - - - - - - -    FVC% 74 72 73 75 74 79 81   FEV1% 79 78 76 78 77 82 88   FEF 25-75 2.7 2.53 2.27 2.37 2.35 2.49 3.1   FEF 25-75% 103 97 87 91 89 94 117   TLC% - - - - - - -   RV - - - - - - -   RV% - - - - - - -   DLCO% - - - - - - -       Imaging:  Results for orders placed during the hospital encounter of 09/11/17   X-Ray Chest PA And Lateral    Narrative PA and lateral views of the chest were obtained.  Comparison -- 6/15/17. status post lung transplant. Port catheter remains on the right. The heart size is normal . The lungs are expanded and clear. No lung consolidation or pleural fluid is identified. Skeletal structures again show sternal wires and a severe compression fracture in the mid thoracic spine that is stable.    Impression  Status post lung transplant. No acute process.      Electronically signed by: SUNDEEP PADILLA MD  Date:     09/11/17  Time:    07:59        Assessment:  1. Aftercare following organ transplant    2. Immunosuppression    3. Prophylactic antibiotic    4. PTLD after lung transplantation    5. Chronic kidney disease (CKD), stage III (moderate)      Plan:     1. FEV1 grossly stable/slight increase since spirometry back in 6/17    2. Continue with Tacrolimus and Prednisone.  Tracrolimus level ordered today     3. Continue with Bactrim and Azithromycin.       4. Continue to follow with Oncology for PTLD.  Has completed 6 cycles of RCHOP (last treatment 11/16)     5. Creatinine stable at 1.5 today.      RTC in 3 months    Tyler Fleming MD, MSc  Pulmonary/Critical Care Fellow    Attending Note:    I have seen and evaluated the patient with the fellow. Their note reflects the content of our discussion and my plan of care.      Erick Nieves MD  Pulmonary/Critical Care Medicine

## 2017-09-12 NOTE — TELEPHONE ENCOUNTER
----- Message from Carri Zavala sent at 9/12/2017  8:38 AM CDT -----  Contact: self  Lena would like to speak with a nurse as soon as possible regarding a conflict with her appts.    Lena can be reached back at [phone]    Thank you

## 2017-10-25 NOTE — PROGRESS NOTES
Subjective:       Patient ID: Lena Lynn is a 61 y.o. female.    Chief Complaint: Follow-up and Lymphoma  Hematology/Hospital History  60-year-old status post lung transplant (in 2012 2/2 hypersensitivity pneumonitis, complicated by A2 rejection in 2013, on chronic pred/tacro), presented with worsening back pain x3 months, found to have a compression fracture at L4 and several lytic lesions in the left sacrum, and also T8, consistent with probable malignancy per CT chest abdomen and pelvis without contrast 8/3/16. S/p bx of T8 lesion -> DLBCL. Transferred to BMT service for treatment. Course complicated by CARROLL likely secondary to tumor lysis syndrome. She was transferred to ICU with volume overload and fever meeting severe sepsis criteria. Patient was placed on broad spectrum antibiotics. Cultures no growth to date. Received rasburicase and was placed on allopurinol. Kidney function improved. Patient was given R-CHOP 8/14-8/15. Bone marrow biopsy and LP with IT MTX were performed 8/12/16. CSF does not appear have involvement with lymphoma and bone marrow biopsy results are pending. Patient resumed prophylactic bactrim MWF, placed on cipro prophylaxis. Continued on azithromycin ppx. Lung tx managing immunosuppression with tacrolimus and prednisone.Received neulasta 8/17/16.     TODAY  Lena presents for follow-up today after completing RCHOP chemotherapy for PTLD/DLBCL. PET imaging in December 2016 at completion of therapy had NEYMAR. CBC/CMP is stable. LDH is normal.  ROS is negative. Hair is regrowing but remains thin, this is frustrating for the patient.    Fatigue   Pertinent negatives include no fatigue.     Review of Systems   Constitutional: Negative for appetite change and fatigue.   HENT: Negative.    Eyes: Negative.    Respiratory: Negative.    Cardiovascular: Negative.    Gastrointestinal: Negative.    Endocrine: Negative for cold intolerance.   Genitourinary: Negative.    Musculoskeletal: Negative  for back pain.   Skin: Negative.    Allergic/Immunologic: Negative for environmental allergies, food allergies and immunocompromised state.   Neurological: Negative.    Hematological: Negative for adenopathy. Does not bruise/bleed easily.   Psychiatric/Behavioral: Negative.        Objective:      Physical Exam   Constitutional: She is oriented to person, place, and time. She appears well-developed and well-nourished.   HENT:   Head: Normocephalic and atraumatic.   Mouth/Throat: No oropharyngeal exudate.   Eyes: Conjunctivae are normal.   Neck: Normal range of motion. Neck supple.   Cardiovascular: Normal rate, regular rhythm, normal heart sounds and intact distal pulses.    No murmur heard.  Pulmonary/Chest: Effort normal. No respiratory distress.   Abdominal: She exhibits no distension. There is no tenderness.   Musculoskeletal: She exhibits no edema.   Neurological: She is alert and oriented to person, place, and time.   Skin: Skin is warm and dry.   Psychiatric: She has a normal mood and affect. Her behavior is normal. Judgment and thought content normal.   Nursing note and vitals reviewed.      Assessment:       1. Diffuse large B-cell lymphoma of lymph nodes of multiple regions    2. Lung replaced by transplant        Plan:       1. DLBCL  Completed 6 cycles of RCHOP chemotherapy   PET at end of treatment NEYMAR  Remains in complete remission by labs and physical examination    Return visit in 3 months for post-lymphoma treatment surveillance with CBC, CMP, and LDH

## 2017-10-30 NOTE — TELEPHONE ENCOUNTER
Received my chart message from patient asking for refills on tacrolimus.  Patient asked to send to [facility].  Prescriptions entered in [id number] and waiting on Dr. Nieves to review and approve.

## 2017-10-30 NOTE — TELEPHONE ENCOUNTER
Received refill request from patient to refill Prednisone.  Prescription for Prednisone 5 mg take 1 tablet by mouth once a day entered in [facility] and ready for Dr. Nieves to review and approve.

## 2017-12-11 NOTE — PROGRESS NOTES
LUNG TRANSPLANT RECIPIENT FOLLOW-UP     Reason for Visit: Follow-up after lung transplantation.                               Date of Transplant: 4/25/12     Reason for Transplant: Hypersensitivity Pneumonitis     Type of Transplant: bilateral sequential lung     CMV Status: D+ / R+      Major Complications:   1. 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 PTLD (vertebral) diagnosed on August 2016                                                                               History of Present Illness: Lena Lynn is a 61 y.o. year old female with the above listed transplant history who presents for routine follow up. Since her last clinic visit she says that she has been feeling some fatigue for about a month now. Says that she was in bed all day yesterday after her daughter's birthday on Saturday where she cooked most of the day. She was having sinus drainage this morning. Denies sick contacts.    Review of Systems   Constitutional: Negative for chills, diaphoresis, fever, malaise/fatigue and weight loss.   HENT: Negative for congestion, ear discharge, ear pain, hearing loss, nosebleeds and sore throat.    Eyes: Negative for blurred vision, double vision and photophobia.   Respiratory: Negative for cough, hemoptysis, sputum production, shortness of breath and wheezing.    Cardiovascular: Negative for chest pain, palpitations, orthopnea, claudication, leg swelling and PND.   Gastrointestinal: Negative for abdominal pain, blood in stool, constipation, diarrhea, heartburn, melena, nausea and vomiting.   Genitourinary: Negative for dysuria, flank pain, frequency, hematuria and urgency.   Musculoskeletal: Negative for back pain, falls, joint pain, myalgias and neck pain.   Skin: Negative for itching and rash.   Neurological: Negative for dizziness, tremors, sensory change, loss of consciousness, weakness and headaches.   Endo/Heme/Allergies: Does not bruise/bleed easily.   Psychiatric/Behavioral: Negative for depression,  "hallucinations and memory loss. The patient is not nervous/anxious and does not have insomnia.        /83 (BP Location: Left arm, Patient Position: Sitting, BP Method: Large (Automatic))   Pulse 98   Temp 97.9 °F (36.6 °C) (Oral)   Resp 20   Ht 5' 7" (1.702 m)   Wt 86.6 kg (191 lb)   LMP  (LMP Unknown)   SpO2 99%   BMI 29.91 kg/m²     Physical Exam   Constitutional: She is oriented to person, place, and time and well-developed, well-nourished, and in no distress. No distress.   HENT:   Head: Normocephalic and atraumatic.   Nose: Nose normal.   Mouth/Throat: Oropharynx is clear and moist. No oropharyngeal exudate.   Eyes: Conjunctivae and EOM are normal. Pupils are equal, round, and reactive to light. No scleral icterus.   Neck: Normal range of motion. Neck supple. No JVD present. No tracheal deviation present. No thyromegaly present.   Cardiovascular: Normal rate, regular rhythm and intact distal pulses.  Exam reveals no gallop and no friction rub.    No murmur heard.  Pulmonary/Chest: Effort normal and breath sounds normal. No stridor. No respiratory distress. She has no wheezes. She has no rales. She exhibits no tenderness.   Abdominal: Soft. Bowel sounds are normal. She exhibits no distension and no mass. There is no tenderness. There is no rebound and no guarding.   Musculoskeletal: Normal range of motion. She exhibits no edema.   Lymphadenopathy:     She has no cervical adenopathy.   Neurological: She is alert and oriented to person, place, and time. No cranial nerve deficit. Gait normal. Coordination normal.   Skin: Skin is warm and dry. No rash noted. She is not diaphoretic. No erythema. No pallor.   Psychiatric: Mood and affect normal.      Labs:  cbc, cmp, tacrolimus Latest Ref Rng & Units 9/11/2017 10/25/2017 12/11/2017   TACROLIMUS LVL 5.0 - 15.0 ng/mL 9.6 - 8.5   WHITE BLOOD CELL COUNT 3.90 - 12.70 K/uL 5.76 6.03 3.99   RBC 4.00 - 5.40 M/uL 3.68(L) 3.57(L) 4.13   HEMOGLOBIN 12.0 - 16.0 g/dL " 12.6 12.0 13.8   HEMATOCRIT 37.0 - 48.5 % 36.5(L) 36.6(L) 40.7   MCV 82 - 98 fL 99(H) 103(H) 99(H)   MCH 27.0 - 31.0 pg 34.2(H) 33.6(H) 33.4(H)   MCHC 32.0 - 36.0 g/dL 34.5 32.8 33.9   RDW 11.5 - 14.5 % 13.6 12.7 12.3   PLATELETS 150 - 350 K/uL 188 170 195   MPV 9.2 - 12.9 fL 9.5 9.9 10.0   GRAN # 1.8 - 7.7 K/uL 3.0 3.5 1.8   LYMPH # 1.0 - 4.8 K/uL 1.8 1.4 1.3   MONO # 0.3 - 1.0 K/uL 0.7 0.9 0.7   EOSINOPHIL% 0.0 - 8.0 % 3.1 3.6 4.5   BASOPHIL% 0.0 - 1.9 % 0.3 0.5 0.8   DIFFERENTIAL METHOD - Automated Automated Automated   SODIUM 136 - 145 mmol/L 141 139 142   POTASSIUM 3.5 - 5.1 mmol/L 3.8 4.1 3.8   CHLORIDE 95 - 110 mmol/L 105 103 106   CO2 23 - 29 mmol/L 29 28 26   GLUCOSE 70 - 110 mg/dL 86 86 82   BUN BLD 6 - 20 mg/dL 29(H) 25(H) 23(H)   CREATININE 0.5 - 1.4 mg/dL 1.5(H) 1.2 1.3   CALCIUM 8.7 - 10.5 mg/dL 9.2 9.6 9.9   PROTEIN TOTAL 6.0 - 8.4 g/dL 6.3 6.5 7.1   ALBUMIN 3.5 - 5.2 g/dL 3.5 3.8 4.0   BILIRUBIN TOTAL 0.1 - 1.0 mg/dL 0.5 0.4 0.8   ALK PHOS 55 - 135 U/L 56 62 64   AST 10 - 40 U/L 18 18 19   ALT 10 - 44 U/L 15 16 11   ANION GAP 8 - 16 mmol/L 7(L) 8 10   EGFR IF AFRICAN AMERICAN >60 mL/min/1.73 m:2 43.3(A) 56.8(A) 51.5(A)   EGFR IF NON-AFRICAN AMERICAN >60 mL/min/1.73 m:2 37.6(A) 49.2(A) 44.7(A)     Pulmonary Function Tests 12/11/2017 9/11/2017 6/15/2017 3/9/2017 1/23/2017 12/8/2016 10/24/2016   FVC 2.24 2.46 2.39 2.43 2.47 2.46 2.62   FEV1 1.89 2.1 2.05 2.02 2.07 2.04 2.18   FEV1/FVC - - - - - - -   TLC (liters) - - - - - - -   DLCO (ml/mmHg sec) - - - - - - -   FVC% 67 74 72 73 75 74 79   FEV1% 72 79 78 76 78 77 82   FEF 25-75 2.32 2.7 2.53 2.27 2.37 2.35 2.49   FEF 25-75% 89 103 97 87 91 89 94   TLC% - - - - - - -   RV - - - - - - -   RV% - - - - - - -   DLCO% - - - - - - -       Imaging:  Results for orders placed during the hospital encounter of 12/11/17   X-Ray Chest PA And Lateral    Narrative PA and lateral views of the chest were obtained.  Comparison -- 9/11/17. Postoperative changes from lung  transplant are again identified. Right central line remains in place. Heart size is normal. The lungs are expanded and clear. No lung consolidation or pleural fluid is detected. Skeletal structures again show sternal wires and a severe compression fracture in the midthoracic spine that is stable.    Impression  Status post lung transplant. No acute process.      Electronically signed by: SUNDEEP PADILLA MD  Date:     12/11/17  Time:    08:40          Assessment:  1. Encounter following organ transplant    2. Lung transplanted    3. Sinus drainage    4. Immunosuppression    5. Prophylactic antibiotic    6. Chronic kidney disease (CKD), stage III (moderate)    7. Malaise      Plan:   1. There has been a slight decrease in her FVC and FEV1 despite good oxygenation and a normal CXR. It could be secondary to a URTI and will check a RVP. It might also be a product of her fatigue. Will repeat in 1-2 weeks.    2. Continue with tacrolimus and prednisone.       3. Continue with Bactrim     4. Continue to monitor her renal function which is moderately decreased but stable.      5. Will follow up on her CMV PCR since viral re-activation can cause malaise. We should also have her PCP check her thyroid function.     6. RTC in three months or earlier if necessary

## 2018-01-01 ENCOUNTER — PES CALL (OUTPATIENT)
Dept: ADMINISTRATIVE | Facility: CLINIC | Age: 63
End: 2018-01-01

## 2018-01-01 ENCOUNTER — PATIENT MESSAGE (OUTPATIENT)
Dept: HEMATOLOGY/ONCOLOGY | Facility: CLINIC | Age: 63
End: 2018-01-01

## 2018-01-01 ENCOUNTER — OFFICE VISIT (OUTPATIENT)
Dept: HEMATOLOGY/ONCOLOGY | Facility: CLINIC | Age: 63
End: 2018-01-01
Payer: MEDICARE

## 2018-01-01 ENCOUNTER — HOSPITAL ENCOUNTER (OUTPATIENT)
Facility: HOSPITAL | Age: 63
Discharge: HOME | End: 2018-03-27
Attending: EMERGENCY MEDICINE | Admitting: INTERNAL MEDICINE
Payer: MEDICARE

## 2018-01-01 ENCOUNTER — HOSPITAL ENCOUNTER (INPATIENT)
Facility: HOSPITAL | Age: 63
LOS: 5 days | DRG: 840 | End: 2018-04-07
Attending: EMERGENCY MEDICINE | Admitting: INTERNAL MEDICINE
Payer: MEDICARE

## 2018-01-01 ENCOUNTER — TELEPHONE (OUTPATIENT)
Dept: TRANSPLANT | Facility: CLINIC | Age: 63
End: 2018-01-01

## 2018-01-01 ENCOUNTER — HOSPITAL ENCOUNTER (OUTPATIENT)
Dept: PULMONOLOGY | Facility: CLINIC | Age: 63
Discharge: HOME | End: 2018-03-12
Payer: MEDICARE

## 2018-01-01 ENCOUNTER — PATIENT MESSAGE (OUTPATIENT)
Dept: TRANSPLANT | Facility: CLINIC | Age: 63
End: 2018-01-01

## 2018-01-01 ENCOUNTER — LAB VISIT (OUTPATIENT)
Dept: LAB | Facility: HOSPITAL | Age: 63
End: 2018-01-01
Attending: INTERNAL MEDICINE
Payer: MEDICARE

## 2018-01-01 ENCOUNTER — HOSPITAL ENCOUNTER (OUTPATIENT)
Facility: HOSPITAL | Age: 63
Discharge: HOME | End: 2018-01-23
Attending: INTERNAL MEDICINE | Admitting: INTERNAL MEDICINE
Payer: MEDICARE

## 2018-01-01 ENCOUNTER — OFFICE VISIT (OUTPATIENT)
Dept: TRANSPLANT | Facility: CLINIC | Age: 63
End: 2018-01-01
Payer: MEDICARE

## 2018-01-01 ENCOUNTER — HOSPITAL ENCOUNTER (OUTPATIENT)
Dept: RADIOLOGY | Facility: HOSPITAL | Age: 63
Discharge: HOME | End: 2018-03-12
Attending: INTERNAL MEDICINE
Payer: MEDICARE

## 2018-01-01 ENCOUNTER — SURGERY (OUTPATIENT)
Age: 63
End: 2018-01-01

## 2018-01-01 VITALS
OXYGEN SATURATION: 98 % | TEMPERATURE: 98 F | DIASTOLIC BLOOD PRESSURE: 68 MMHG | RESPIRATION RATE: 16 BRPM | HEIGHT: 68 IN | SYSTOLIC BLOOD PRESSURE: 102 MMHG | BODY MASS INDEX: 30.21 KG/M2 | HEART RATE: 85 BPM | WEIGHT: 199.31 LBS

## 2018-01-01 VITALS
BODY MASS INDEX: 28.95 KG/M2 | HEIGHT: 68 IN | HEART RATE: 81 BPM | RESPIRATION RATE: 18 BRPM | TEMPERATURE: 98 F | SYSTOLIC BLOOD PRESSURE: 140 MMHG | WEIGHT: 191 LBS | OXYGEN SATURATION: 99 % | DIASTOLIC BLOOD PRESSURE: 89 MMHG

## 2018-01-01 VITALS
DIASTOLIC BLOOD PRESSURE: 43 MMHG | BODY MASS INDEX: 28.07 KG/M2 | HEIGHT: 68 IN | RESPIRATION RATE: 28 BRPM | WEIGHT: 185.19 LBS | OXYGEN SATURATION: 92 % | TEMPERATURE: 99 F | HEART RATE: 106 BPM | SYSTOLIC BLOOD PRESSURE: 79 MMHG

## 2018-01-01 VITALS
BODY MASS INDEX: 29.66 KG/M2 | RESPIRATION RATE: 20 BRPM | HEART RATE: 106 BPM | HEIGHT: 67 IN | TEMPERATURE: 100 F | OXYGEN SATURATION: 100 % | DIASTOLIC BLOOD PRESSURE: 76 MMHG | SYSTOLIC BLOOD PRESSURE: 125 MMHG | WEIGHT: 189 LBS

## 2018-01-01 VITALS
DIASTOLIC BLOOD PRESSURE: 70 MMHG | TEMPERATURE: 98 F | SYSTOLIC BLOOD PRESSURE: 117 MMHG | HEART RATE: 80 BPM | BODY MASS INDEX: 28.29 KG/M2 | HEIGHT: 69 IN | RESPIRATION RATE: 17 BRPM | WEIGHT: 191 LBS | OXYGEN SATURATION: 99 %

## 2018-01-01 DIAGNOSIS — Z94.2 LUNG REPLACED BY TRANSPLANT: ICD-10-CM

## 2018-01-01 DIAGNOSIS — Z79.2 PROPHYLACTIC ANTIBIOTIC: ICD-10-CM

## 2018-01-01 DIAGNOSIS — M81.8 DRUG-INDUCED OSTEOPOROSIS: ICD-10-CM

## 2018-01-01 DIAGNOSIS — N18.30 CHRONIC KIDNEY DISEASE (CKD), STAGE III (MODERATE): ICD-10-CM

## 2018-01-01 DIAGNOSIS — D64.9 ANEMIA: ICD-10-CM

## 2018-01-01 DIAGNOSIS — Z94.2 LUNG REPLACED BY TRANSPLANT: Primary | ICD-10-CM

## 2018-01-01 DIAGNOSIS — E87.5 HYPERKALEMIA: ICD-10-CM

## 2018-01-01 DIAGNOSIS — Z78.0 POST-MENOPAUSAL: ICD-10-CM

## 2018-01-01 DIAGNOSIS — M79.603 ARM PAIN: ICD-10-CM

## 2018-01-01 DIAGNOSIS — T50.905A DRUG-INDUCED OSTEOPOROSIS: ICD-10-CM

## 2018-01-01 DIAGNOSIS — T86.819 COMPLICATION OF TRANSPLANTED LUNG, UNSPECIFIED COMPLICATION: ICD-10-CM

## 2018-01-01 DIAGNOSIS — G89.3 CANCER ASSOCIATED PAIN: ICD-10-CM

## 2018-01-01 DIAGNOSIS — N18.30 STAGE 3 CHRONIC KIDNEY DISEASE: ICD-10-CM

## 2018-01-01 DIAGNOSIS — M54.50 ACUTE LOW BACK PAIN WITHOUT SCIATICA, UNSPECIFIED BACK PAIN LATERALITY: ICD-10-CM

## 2018-01-01 DIAGNOSIS — N17.9 AKI (ACUTE KIDNEY INJURY): ICD-10-CM

## 2018-01-01 DIAGNOSIS — C83.38 DIFFUSE LARGE B-CELL LYMPHOMA OF LYMPH NODES OF MULTIPLE REGIONS: Primary | ICD-10-CM

## 2018-01-01 DIAGNOSIS — D84.9 IMMUNOSUPPRESSION: ICD-10-CM

## 2018-01-01 DIAGNOSIS — S22.000A COMPRESSION FRACTURE OF THORACIC VERTEBRA, CLOSED, INITIAL ENCOUNTER: ICD-10-CM

## 2018-01-01 DIAGNOSIS — D64.9 ANEMIA, UNSPECIFIED TYPE: Primary | ICD-10-CM

## 2018-01-01 DIAGNOSIS — G47.00 INSOMNIA, UNSPECIFIED TYPE: ICD-10-CM

## 2018-01-01 DIAGNOSIS — D47.Z1 POST-TRANSPLANT LYMPHOPROLIFERATIVE DISORDER: ICD-10-CM

## 2018-01-01 DIAGNOSIS — Z48.298 ENCOUNTER FOLLOWING ORGAN TRANSPLANT: Primary | ICD-10-CM

## 2018-01-01 DIAGNOSIS — M25.512 ACUTE PAIN OF LEFT SHOULDER: ICD-10-CM

## 2018-01-01 DIAGNOSIS — M25.512 SHOULDER PAIN, LEFT: ICD-10-CM

## 2018-01-01 DIAGNOSIS — C83.38 DIFFUSE LARGE B-CELL LYMPHOMA OF LYMPH NODES OF MULTIPLE REGIONS: ICD-10-CM

## 2018-01-01 DIAGNOSIS — T86.819 COMPLICATION OF TRANSPLANTED LUNG: Primary | ICD-10-CM

## 2018-01-01 LAB
ABO + RH BLD: NORMAL
ABO + RH BLD: NORMAL
ACID FAST MOD KINY STN SPEC: NORMAL
ACID FAST MOD KINY STN SPEC: NORMAL
ALBUMIN SERPL BCP-MCNC: 2.1 G/DL
ALBUMIN SERPL BCP-MCNC: 2.2 G/DL
ALBUMIN SERPL BCP-MCNC: 2.3 G/DL
ALBUMIN SERPL BCP-MCNC: 2.5 G/DL
ALBUMIN SERPL BCP-MCNC: 2.9 G/DL
ALBUMIN SERPL BCP-MCNC: 3.1 G/DL
ALBUMIN SERPL BCP-MCNC: 3.1 G/DL
ALBUMIN SERPL BCP-MCNC: 3.2 G/DL
ALLENS TEST: ABNORMAL
ALLENS TEST: ABNORMAL
ALP SERPL-CCNC: 105 U/L
ALP SERPL-CCNC: 121 U/L
ALP SERPL-CCNC: 175 U/L
ALP SERPL-CCNC: 186 U/L
ALP SERPL-CCNC: 195 U/L
ALP SERPL-CCNC: 205 U/L
ALP SERPL-CCNC: 206 U/L
ALP SERPL-CCNC: 206 U/L
ALP SERPL-CCNC: 208 U/L
ALP SERPL-CCNC: 214 U/L
ALP SERPL-CCNC: 224 U/L
ALP SERPL-CCNC: 237 U/L
ALT SERPL W/O P-5'-P-CCNC: 18 U/L
ALT SERPL W/O P-5'-P-CCNC: 21 U/L
ALT SERPL W/O P-5'-P-CCNC: 22 U/L
ALT SERPL W/O P-5'-P-CCNC: 23 U/L
ALT SERPL W/O P-5'-P-CCNC: 24 U/L
ALT SERPL W/O P-5'-P-CCNC: 24 U/L
ALT SERPL W/O P-5'-P-CCNC: 25 U/L
ALT SERPL W/O P-5'-P-CCNC: 26 U/L
ALT SERPL W/O P-5'-P-CCNC: 28 U/L
AMMONIA PLAS-SCNC: 41 UMOL/L
ANION GAP SERPL CALC-SCNC: 11 MMOL/L
ANION GAP SERPL CALC-SCNC: 12 MMOL/L
ANION GAP SERPL CALC-SCNC: 12 MMOL/L
ANION GAP SERPL CALC-SCNC: 13 MMOL/L
ANION GAP SERPL CALC-SCNC: 15 MMOL/L
ANION GAP SERPL CALC-SCNC: 16 MMOL/L
ANION GAP SERPL CALC-SCNC: 16 MMOL/L
ANION GAP SERPL CALC-SCNC: 17 MMOL/L
ANION GAP SERPL CALC-SCNC: 18 MMOL/L
ANION GAP SERPL CALC-SCNC: 19 MMOL/L
ANION GAP SERPL CALC-SCNC: 20 MMOL/L
ANION GAP SERPL CALC-SCNC: 9 MMOL/L
ANION GAP SERPL CALC-SCNC: 9 MMOL/L
ANISOCYTOSIS BLD QL SMEAR: ABNORMAL
APPEARANCE FLD: CLEAR
APTT BLDCRRT: 23.7 SEC
AST SERPL-CCNC: 167 U/L
AST SERPL-CCNC: 226 U/L
AST SERPL-CCNC: 236 U/L
AST SERPL-CCNC: 263 U/L
AST SERPL-CCNC: 263 U/L
AST SERPL-CCNC: 315 U/L
AST SERPL-CCNC: 315 U/L
AST SERPL-CCNC: 341 U/L
AST SERPL-CCNC: 341 U/L
AST SERPL-CCNC: 345 U/L
AST SERPL-CCNC: 46 U/L
AST SERPL-CCNC: 51 U/L
AST SERPL-CCNC: 91 U/L
AST SERPL-CCNC: 97 U/L
BACTERIA #/AREA URNS AUTO: ABNORMAL /HPF
BACTERIA #/AREA URNS AUTO: ABNORMAL /HPF
BACTERIA SPEC AEROBE CULT: NO GROWTH
BACTERIA SPEC AEROBE CULT: NORMAL
BACTERIA UR CULT: NORMAL
BACTERIA UR CULT: NORMAL
BASOPHILS # BLD AUTO: 0 K/UL
BASOPHILS # BLD AUTO: 0.01 K/UL
BASOPHILS # BLD AUTO: 0.01 K/UL
BASOPHILS # BLD AUTO: 0.02 K/UL
BASOPHILS # BLD AUTO: 0.03 K/UL
BASOPHILS # BLD AUTO: 0.04 K/UL
BASOPHILS # BLD AUTO: 0.04 K/UL
BASOPHILS # BLD AUTO: 0.05 K/UL
BASOPHILS # BLD AUTO: 0.05 K/UL
BASOPHILS # BLD AUTO: 0.06 K/UL
BASOPHILS # BLD AUTO: ABNORMAL K/UL
BASOPHILS # BLD AUTO: ABNORMAL K/UL
BASOPHILS NFR BLD: 0 %
BASOPHILS NFR BLD: 0.2 %
BASOPHILS NFR BLD: 0.3 %
BASOPHILS NFR BLD: 0.4 %
BASOPHILS NFR BLD: 0.6 %
BASOPHILS NFR FLD MANUAL: 1 %
BILIRUB DIRECT SERPL-MCNC: >14 MG/DL
BILIRUB DIRECT SERPL-MCNC: >14 MG/DL
BILIRUB SERPL-MCNC: 1.6 MG/DL
BILIRUB SERPL-MCNC: 2.1 MG/DL
BILIRUB SERPL-MCNC: 28.9 MG/DL
BILIRUB SERPL-MCNC: 29.4 MG/DL
BILIRUB SERPL-MCNC: 41.4 MG/DL
BILIRUB SERPL-MCNC: 44.3 MG/DL
BILIRUB SERPL-MCNC: 45.5 MG/DL
BILIRUB SERPL-MCNC: 46.7 MG/DL
BILIRUB SERPL-MCNC: 46.7 MG/DL
BILIRUB SERPL-MCNC: 47 MG/DL
BILIRUB SERPL-MCNC: 47 MG/DL
BILIRUB SERPL-MCNC: 49 MG/DL
BILIRUB SERPL-MCNC: 6.5 MG/DL
BILIRUB SERPL-MCNC: 8.6 MG/DL
BILIRUB UR QL STRIP: ABNORMAL
BILIRUB UR QL STRIP: NEGATIVE
BLD GP AB SCN CELLS X3 SERPL QL: NORMAL
BLD PROD TYP BPU: NORMAL
BLOOD UNIT EXPIRATION DATE: NORMAL
BLOOD UNIT TYPE CODE: 5100
BLOOD UNIT TYPE: NORMAL
BODY FLD TYPE: NORMAL
BUN SERPL-MCNC: 101 MG/DL
BUN SERPL-MCNC: 20 MG/DL
BUN SERPL-MCNC: 23 MG/DL
BUN SERPL-MCNC: 35 MG/DL
BUN SERPL-MCNC: 36 MG/DL
BUN SERPL-MCNC: 43 MG/DL
BUN SERPL-MCNC: 51 MG/DL
BUN SERPL-MCNC: 56 MG/DL
BUN SERPL-MCNC: 57 MG/DL
BUN SERPL-MCNC: 61 MG/DL
BUN SERPL-MCNC: 65 MG/DL
BUN SERPL-MCNC: 76 MG/DL
BUN SERPL-MCNC: 77 MG/DL
BUN SERPL-MCNC: 83 MG/DL
BUN SERPL-MCNC: 90 MG/DL
BUN SERPL-MCNC: 90 MG/DL
BUN SERPL-MCNC: 95 MG/DL
BUN SERPL-MCNC: 95 MG/DL
BURR CELLS BLD QL SMEAR: ABNORMAL
CALCIUM SERPL-MCNC: 10 MG/DL
CALCIUM SERPL-MCNC: 7.7 MG/DL
CALCIUM SERPL-MCNC: 8.1 MG/DL
CALCIUM SERPL-MCNC: 8.3 MG/DL
CALCIUM SERPL-MCNC: 8.4 MG/DL
CALCIUM SERPL-MCNC: 8.4 MG/DL
CALCIUM SERPL-MCNC: 8.6 MG/DL
CALCIUM SERPL-MCNC: 8.8 MG/DL
CALCIUM SERPL-MCNC: 8.9 MG/DL
CALCIUM SERPL-MCNC: 9 MG/DL
CALCIUM SERPL-MCNC: 9 MG/DL
CALCIUM SERPL-MCNC: 9.2 MG/DL
CALCIUM SERPL-MCNC: 9.2 MG/DL
CALCIUM SERPL-MCNC: 9.3 MG/DL
CHLORIDE SERPL-SCNC: 100 MMOL/L
CHLORIDE SERPL-SCNC: 102 MMOL/L
CHLORIDE SERPL-SCNC: 102 MMOL/L
CHLORIDE SERPL-SCNC: 103 MMOL/L
CHLORIDE SERPL-SCNC: 103 MMOL/L
CHLORIDE SERPL-SCNC: 104 MMOL/L
CHLORIDE SERPL-SCNC: 105 MMOL/L
CHLORIDE SERPL-SCNC: 106 MMOL/L
CHLORIDE SERPL-SCNC: 97 MMOL/L
CHLORIDE SERPL-SCNC: 99 MMOL/L
CK SERPL-CCNC: 141 U/L
CLARITY UR REFRACT.AUTO: ABNORMAL
CLARITY UR REFRACT.AUTO: ABNORMAL
CMV DNA SPEC QL NAA+PROBE: NOT DETECTED
CO2 SERPL-SCNC: 11 MMOL/L
CO2 SERPL-SCNC: 13 MMOL/L
CO2 SERPL-SCNC: 17 MMOL/L
CO2 SERPL-SCNC: 17 MMOL/L
CO2 SERPL-SCNC: 18 MMOL/L
CO2 SERPL-SCNC: 18 MMOL/L
CO2 SERPL-SCNC: 19 MMOL/L
CO2 SERPL-SCNC: 20 MMOL/L
CO2 SERPL-SCNC: 20 MMOL/L
CO2 SERPL-SCNC: 21 MMOL/L
CO2 SERPL-SCNC: 22 MMOL/L
CO2 SERPL-SCNC: 22 MMOL/L
CO2 SERPL-SCNC: 23 MMOL/L
CO2 SERPL-SCNC: 24 MMOL/L
CO2 SERPL-SCNC: 24 MMOL/L
CODING SYSTEM: NORMAL
COLOR FLD: COLORLESS
COLOR UR AUTO: ABNORMAL
COLOR UR AUTO: ABNORMAL
CREAT SERPL-MCNC: 1.2 MG/DL
CREAT SERPL-MCNC: 1.3 MG/DL
CREAT SERPL-MCNC: 1.6 MG/DL
CREAT SERPL-MCNC: 1.7 MG/DL
CREAT SERPL-MCNC: 1.9 MG/DL
CREAT SERPL-MCNC: 1.9 MG/DL
CREAT SERPL-MCNC: 2 MG/DL
CREAT SERPL-MCNC: 2.1 MG/DL
CREAT SERPL-MCNC: 2.2 MG/DL
CREAT SERPL-MCNC: 2.3 MG/DL
CREAT SERPL-MCNC: 2.7 MG/DL
CREAT SERPL-MCNC: 2.7 MG/DL
CREAT SERPL-MCNC: 2.9 MG/DL
CREAT SERPL-MCNC: 3.2 MG/DL
CREAT SERPL-MCNC: 3.2 MG/DL
CREAT SERPL-MCNC: 3.3 MG/DL
CREAT SERPL-MCNC: 3.3 MG/DL
CREAT SERPL-MCNC: 3.5 MG/DL
CREAT UR-MCNC: 159 MG/DL
CREAT UR-MCNC: 173 MG/DL
D DIMER PPP IA.FEU-MCNC: 19.24 MG/L FEU
DACRYOCYTES BLD QL SMEAR: ABNORMAL
DAT IGG-SP REAG RBC-IMP: NORMAL
DELSYS: ABNORMAL
DIFFERENTIAL METHOD: ABNORMAL
DISPENSE STATUS: NORMAL
DOHLE BOD BLD QL SMEAR: ABNORMAL
EBV DNA # BLD NAA+PROBE: NORMAL {COPIES}/ML
EBV DNA BY PCR: NORMAL
ENTEROVIRUS: NOT DETECTED
EOSINOPHIL # BLD AUTO: 0 K/UL
EOSINOPHIL # BLD AUTO: 0.1 K/UL
EOSINOPHIL # BLD AUTO: ABNORMAL K/UL
EOSINOPHIL # BLD AUTO: ABNORMAL K/UL
EOSINOPHIL NFR BLD: 0 %
EOSINOPHIL NFR BLD: 0.1 %
EOSINOPHIL NFR BLD: 0.1 %
EOSINOPHIL NFR BLD: 0.3 %
EOSINOPHIL NFR BLD: 0.3 %
EOSINOPHIL NFR BLD: 0.5 %
EOSINOPHIL NFR BLD: 0.6 %
EOSINOPHIL NFR BLD: 0.7 %
EOSINOPHIL NFR BLD: 1.7 %
EOSINOPHIL NFR FLD MANUAL: 2 %
ERYTHROCYTE [DISTWIDTH] IN BLOOD BY AUTOMATED COUNT: 14.1 %
ERYTHROCYTE [DISTWIDTH] IN BLOOD BY AUTOMATED COUNT: 14.6 %
ERYTHROCYTE [DISTWIDTH] IN BLOOD BY AUTOMATED COUNT: 14.6 %
ERYTHROCYTE [DISTWIDTH] IN BLOOD BY AUTOMATED COUNT: 17.3 %
ERYTHROCYTE [DISTWIDTH] IN BLOOD BY AUTOMATED COUNT: 18.5 %
ERYTHROCYTE [DISTWIDTH] IN BLOOD BY AUTOMATED COUNT: 20.8 %
ERYTHROCYTE [DISTWIDTH] IN BLOOD BY AUTOMATED COUNT: 22.5 %
ERYTHROCYTE [DISTWIDTH] IN BLOOD BY AUTOMATED COUNT: 23.7 %
ERYTHROCYTE [DISTWIDTH] IN BLOOD BY AUTOMATED COUNT: 23.8 %
ERYTHROCYTE [DISTWIDTH] IN BLOOD BY AUTOMATED COUNT: 24.2 %
ERYTHROCYTE [DISTWIDTH] IN BLOOD BY AUTOMATED COUNT: 24.6 %
ERYTHROCYTE [DISTWIDTH] IN BLOOD BY AUTOMATED COUNT: 25.1 %
ERYTHROCYTE [DISTWIDTH] IN BLOOD BY AUTOMATED COUNT: 26 %
ERYTHROCYTE [DISTWIDTH] IN BLOOD BY AUTOMATED COUNT: 26.7 %
EST. GFR  (AFRICAN AMERICAN): 15.5 ML/MIN/1.73 M^2
EST. GFR  (AFRICAN AMERICAN): 16.6 ML/MIN/1.73 M^2
EST. GFR  (AFRICAN AMERICAN): 16.6 ML/MIN/1.73 M^2
EST. GFR  (AFRICAN AMERICAN): 17.2 ML/MIN/1.73 M^2
EST. GFR  (AFRICAN AMERICAN): 17.2 ML/MIN/1.73 M^2
EST. GFR  (AFRICAN AMERICAN): 19.4 ML/MIN/1.73 M^2
EST. GFR  (AFRICAN AMERICAN): 21.1 ML/MIN/1.73 M^2
EST. GFR  (AFRICAN AMERICAN): 21.1 ML/MIN/1.73 M^2
EST. GFR  (AFRICAN AMERICAN): 25.7 ML/MIN/1.73 M^2
EST. GFR  (AFRICAN AMERICAN): 27.1 ML/MIN/1.73 M^2
EST. GFR  (AFRICAN AMERICAN): 28.7 ML/MIN/1.73 M^2
EST. GFR  (AFRICAN AMERICAN): 30.4 ML/MIN/1.73 M^2
EST. GFR  (AFRICAN AMERICAN): 32.3 ML/MIN/1.73 M^2
EST. GFR  (AFRICAN AMERICAN): 32.3 ML/MIN/1.73 M^2
EST. GFR  (AFRICAN AMERICAN): 37 ML/MIN/1.73 M^2
EST. GFR  (AFRICAN AMERICAN): 39.8 ML/MIN/1.73 M^2
EST. GFR  (AFRICAN AMERICAN): 51.2 ML/MIN/1.73 M^2
EST. GFR  (AFRICAN AMERICAN): 56.4 ML/MIN/1.73 M^2
EST. GFR  (NON AFRICAN AMERICAN): 13.4 ML/MIN/1.73 M^2
EST. GFR  (NON AFRICAN AMERICAN): 14.4 ML/MIN/1.73 M^2
EST. GFR  (NON AFRICAN AMERICAN): 14.4 ML/MIN/1.73 M^2
EST. GFR  (NON AFRICAN AMERICAN): 14.9 ML/MIN/1.73 M^2
EST. GFR  (NON AFRICAN AMERICAN): 14.9 ML/MIN/1.73 M^2
EST. GFR  (NON AFRICAN AMERICAN): 16.8 ML/MIN/1.73 M^2
EST. GFR  (NON AFRICAN AMERICAN): 18.3 ML/MIN/1.73 M^2
EST. GFR  (NON AFRICAN AMERICAN): 18.3 ML/MIN/1.73 M^2
EST. GFR  (NON AFRICAN AMERICAN): 22.3 ML/MIN/1.73 M^2
EST. GFR  (NON AFRICAN AMERICAN): 23.5 ML/MIN/1.73 M^2
EST. GFR  (NON AFRICAN AMERICAN): 24.9 ML/MIN/1.73 M^2
EST. GFR  (NON AFRICAN AMERICAN): 26.4 ML/MIN/1.73 M^2
EST. GFR  (NON AFRICAN AMERICAN): 28.1 ML/MIN/1.73 M^2
EST. GFR  (NON AFRICAN AMERICAN): 28.1 ML/MIN/1.73 M^2
EST. GFR  (NON AFRICAN AMERICAN): 32.1 ML/MIN/1.73 M^2
EST. GFR  (NON AFRICAN AMERICAN): 34.5 ML/MIN/1.73 M^2
EST. GFR  (NON AFRICAN AMERICAN): 44.4 ML/MIN/1.73 M^2
EST. GFR  (NON AFRICAN AMERICAN): 48.9 ML/MIN/1.73 M^2
FIBRINOGEN PPP-MCNC: 303 MG/DL
FIBRINOGEN PPP-MCNC: 383 MG/DL
FLOW: 5
FOLATE SERPL-MCNC: 15.3 NG/ML
FUNGUS SPEC CULT: NORMAL
FUNGUS SPEC CULT: NORMAL
G6PD RBC-CCNT: 13.7 U/G HGB (ref 7–20.5)
GALACTOMANNAN AG SPEC-ACNC: <0.5 INDEX
GIANT PLATELETS BLD QL SMEAR: PRESENT
GIANT PLATELETS BLD QL SMEAR: PRESENT
GLUCOSE SERPL-MCNC: 111 MG/DL
GLUCOSE SERPL-MCNC: 144 MG/DL
GLUCOSE SERPL-MCNC: 161 MG/DL
GLUCOSE SERPL-MCNC: 168 MG/DL
GLUCOSE SERPL-MCNC: 168 MG/DL
GLUCOSE SERPL-MCNC: 172 MG/DL
GLUCOSE SERPL-MCNC: 174 MG/DL
GLUCOSE SERPL-MCNC: 174 MG/DL
GLUCOSE SERPL-MCNC: 183 MG/DL
GLUCOSE SERPL-MCNC: 184 MG/DL
GLUCOSE SERPL-MCNC: 185 MG/DL
GLUCOSE SERPL-MCNC: 295 MG/DL
GLUCOSE SERPL-MCNC: 82 MG/DL
GLUCOSE SERPL-MCNC: 82 MG/DL
GLUCOSE SERPL-MCNC: 90 MG/DL
GLUCOSE SERPL-MCNC: 91 MG/DL
GLUCOSE SERPL-MCNC: 93 MG/DL
GLUCOSE SERPL-MCNC: 95 MG/DL
GLUCOSE UR QL STRIP: NEGATIVE
GLUCOSE UR QL STRIP: NEGATIVE
GRAM STN SPEC: NORMAL
HAPTOGLOB SERPL-MCNC: 135 MG/DL
HAPTOGLOB SERPL-MCNC: <10 MG/DL
HCO3 UR-SCNC: 18 MMOL/L (ref 24–28)
HCO3 UR-SCNC: 18.3 MMOL/L (ref 24–28)
HCT VFR BLD AUTO: 14.6 %
HCT VFR BLD AUTO: 19 %
HCT VFR BLD AUTO: 23.4 %
HCT VFR BLD AUTO: 23.5 %
HCT VFR BLD AUTO: 23.7 %
HCT VFR BLD AUTO: 23.9 %
HCT VFR BLD AUTO: 24.2 %
HCT VFR BLD AUTO: 24.2 %
HCT VFR BLD AUTO: 24.6 %
HCT VFR BLD AUTO: 25.9 %
HCT VFR BLD AUTO: 26.4 %
HCT VFR BLD AUTO: 30.4 %
HCT VFR BLD AUTO: 30.6 %
HCT VFR BLD AUTO: 32.1 %
HGB BLD-MCNC: 10.4 G/DL
HGB BLD-MCNC: 4.4 G/DL
HGB BLD-MCNC: 5.9 G/DL
HGB BLD-MCNC: 7.2 G/DL
HGB BLD-MCNC: 7.5 G/DL
HGB BLD-MCNC: 7.6 G/DL
HGB BLD-MCNC: 7.7 G/DL
HGB BLD-MCNC: 7.8 G/DL
HGB BLD-MCNC: 7.8 G/DL
HGB BLD-MCNC: 7.9 G/DL
HGB BLD-MCNC: 8.1 G/DL
HGB BLD-MCNC: 8.2 G/DL
HGB BLD-MCNC: 9.3 G/DL
HGB BLD-MCNC: 9.6 G/DL
HGB UR QL STRIP: NEGATIVE
HGB UR QL STRIP: NEGATIVE
HUMAN BOCAVIRUS: NOT DETECTED
HUMAN CORONAVIRUS, COMMON COLD VIRUS: NOT DETECTED
HYALINE CASTS UR QL AUTO: 0 /LPF
HYALINE CASTS UR QL AUTO: 11 /LPF
HYPOCHROMIA BLD QL SMEAR: ABNORMAL
IMM GRANULOCYTES # BLD AUTO: 0.01 K/UL
IMM GRANULOCYTES # BLD AUTO: 0.05 K/UL
IMM GRANULOCYTES # BLD AUTO: 0.05 K/UL
IMM GRANULOCYTES # BLD AUTO: 0.08 K/UL
IMM GRANULOCYTES # BLD AUTO: 0.2 K/UL
IMM GRANULOCYTES # BLD AUTO: 0.59 K/UL
IMM GRANULOCYTES # BLD AUTO: 0.65 K/UL
IMM GRANULOCYTES # BLD AUTO: 0.71 K/UL
IMM GRANULOCYTES # BLD AUTO: 0.73 K/UL
IMM GRANULOCYTES # BLD AUTO: 0.78 K/UL
IMM GRANULOCYTES # BLD AUTO: 0.95 K/UL
IMM GRANULOCYTES # BLD AUTO: ABNORMAL K/UL
IMM GRANULOCYTES # BLD AUTO: ABNORMAL K/UL
IMM GRANULOCYTES NFR BLD AUTO: 0.3 %
IMM GRANULOCYTES NFR BLD AUTO: 0.9 %
IMM GRANULOCYTES NFR BLD AUTO: 1 %
IMM GRANULOCYTES NFR BLD AUTO: 1.1 %
IMM GRANULOCYTES NFR BLD AUTO: 2.9 %
IMM GRANULOCYTES NFR BLD AUTO: 3.3 %
IMM GRANULOCYTES NFR BLD AUTO: 3.4 %
IMM GRANULOCYTES NFR BLD AUTO: 3.7 %
IMM GRANULOCYTES NFR BLD AUTO: 3.7 %
IMM GRANULOCYTES NFR BLD AUTO: 3.8 %
IMM GRANULOCYTES NFR BLD AUTO: 4.3 %
IMM GRANULOCYTES NFR BLD AUTO: ABNORMAL %
IMM GRANULOCYTES NFR BLD AUTO: ABNORMAL %
INFLUENZA A - H1N1-09: NOT DETECTED
INR PPP: 1.9
INR PPP: 1.9
KETONES UR QL STRIP: ABNORMAL
KETONES UR QL STRIP: NEGATIVE
LACTATE SERPL-SCNC: 1.4 MMOL/L
LACTATE SERPL-SCNC: 9 MMOL/L
LACTATE SERPL-SCNC: >12 MMOL/L
LDH SERPL L TO P-CCNC: 1200 U/L
LDH SERPL L TO P-CCNC: 2539 U/L
LDH SERPL L TO P-CCNC: 9048 U/L
LEGIONELLA PNEUMOPHILA: NOT DETECTED
LEUKOCYTE ESTERASE UR QL STRIP: ABNORMAL
LEUKOCYTE ESTERASE UR QL STRIP: ABNORMAL
LOWER 95% CONFIDENCE INTERVAL: NORMAL
LYMPHOCYTES # BLD AUTO: 0.7 K/UL
LYMPHOCYTES # BLD AUTO: 0.7 K/UL
LYMPHOCYTES # BLD AUTO: 0.8 K/UL
LYMPHOCYTES # BLD AUTO: 0.9 K/UL
LYMPHOCYTES # BLD AUTO: 1 K/UL
LYMPHOCYTES # BLD AUTO: 1 K/UL
LYMPHOCYTES # BLD AUTO: 1.1 K/UL
LYMPHOCYTES # BLD AUTO: 1.2 K/UL
LYMPHOCYTES # BLD AUTO: 1.6 K/UL
LYMPHOCYTES # BLD AUTO: 2.2 K/UL
LYMPHOCYTES # BLD AUTO: ABNORMAL K/UL
LYMPHOCYTES # BLD AUTO: ABNORMAL K/UL
LYMPHOCYTES NFR BLD: 13.4 %
LYMPHOCYTES NFR BLD: 13.8 %
LYMPHOCYTES NFR BLD: 14 %
LYMPHOCYTES NFR BLD: 14.4 %
LYMPHOCYTES NFR BLD: 15.7 %
LYMPHOCYTES NFR BLD: 16.2 %
LYMPHOCYTES NFR BLD: 16.7 %
LYMPHOCYTES NFR BLD: 18.9 %
LYMPHOCYTES NFR BLD: 20 %
LYMPHOCYTES NFR BLD: 4.4 %
LYMPHOCYTES NFR BLD: 4.7 %
LYMPHOCYTES NFR BLD: 4.9 %
LYMPHOCYTES NFR BLD: 5.8 %
LYMPHOCYTES NFR BLD: 7.3 %
LYMPHOCYTES NFR FLD MANUAL: 48 %
MAGNESIUM SERPL-MCNC: 1.7 MG/DL
MAGNESIUM SERPL-MCNC: 2.6 MG/DL
MAGNESIUM SERPL-MCNC: 2.7 MG/DL
MAGNESIUM SERPL-MCNC: 2.9 MG/DL
MAGNESIUM SERPL-MCNC: 3 MG/DL
MCH RBC QN AUTO: 29.5 PG
MCH RBC QN AUTO: 29.6 PG
MCH RBC QN AUTO: 29.9 PG
MCH RBC QN AUTO: 30.6 PG
MCH RBC QN AUTO: 30.6 PG
MCH RBC QN AUTO: 30.7 PG
MCH RBC QN AUTO: 31 PG
MCH RBC QN AUTO: 31.1 PG
MCH RBC QN AUTO: 31.2 PG
MCH RBC QN AUTO: 31.3 PG
MCH RBC QN AUTO: 31.6 PG
MCH RBC QN AUTO: 31.9 PG
MCH RBC QN AUTO: 32.4 PG
MCH RBC QN AUTO: 32.9 PG
MCHC RBC AUTO-ENTMCNC: 30.1 G/DL
MCHC RBC AUTO-ENTMCNC: 30.1 G/DL
MCHC RBC AUTO-ENTMCNC: 30.6 G/DL
MCHC RBC AUTO-ENTMCNC: 31 G/DL
MCHC RBC AUTO-ENTMCNC: 31.1 G/DL
MCHC RBC AUTO-ENTMCNC: 31.1 G/DL
MCHC RBC AUTO-ENTMCNC: 31.3 G/DL
MCHC RBC AUTO-ENTMCNC: 31.4 G/DL
MCHC RBC AUTO-ENTMCNC: 32.1 G/DL
MCHC RBC AUTO-ENTMCNC: 32.2 G/DL
MCHC RBC AUTO-ENTMCNC: 32.4 G/DL
MCHC RBC AUTO-ENTMCNC: 32.5 G/DL
MCHC RBC AUTO-ENTMCNC: 32.8 G/DL
MCHC RBC AUTO-ENTMCNC: 32.9 G/DL
MCV RBC AUTO: 100 FL
MCV RBC AUTO: 102 FL
MCV RBC AUTO: 102 FL
MCV RBC AUTO: 104 FL
MCV RBC AUTO: 106 FL
MCV RBC AUTO: 94 FL
MCV RBC AUTO: 95 FL
MCV RBC AUTO: 95 FL
MCV RBC AUTO: 96 FL
MCV RBC AUTO: 97 FL
MCV RBC AUTO: 97 FL
MCV RBC AUTO: 99 FL
METAMYELOCYTES NFR BLD MANUAL: 1 %
METAMYELOCYTES NFR BLD MANUAL: 1 %
MICROSCOPIC COMMENT: ABNORMAL
MICROSCOPIC COMMENT: ABNORMAL
MODE: ABNORMAL
MONOCYTES # BLD AUTO: 0.5 K/UL
MONOCYTES # BLD AUTO: 0.6 K/UL
MONOCYTES # BLD AUTO: 0.6 K/UL
MONOCYTES # BLD AUTO: 0.7 K/UL
MONOCYTES # BLD AUTO: 0.7 K/UL
MONOCYTES # BLD AUTO: 0.8 K/UL
MONOCYTES # BLD AUTO: 1.1 K/UL
MONOCYTES # BLD AUTO: 1.1 K/UL
MONOCYTES # BLD AUTO: 1.4 K/UL
MONOCYTES # BLD AUTO: 1.4 K/UL
MONOCYTES # BLD AUTO: 1.5 K/UL
MONOCYTES # BLD AUTO: 1.7 K/UL
MONOCYTES # BLD AUTO: ABNORMAL K/UL
MONOCYTES # BLD AUTO: ABNORMAL K/UL
MONOCYTES NFR BLD: 12.9 %
MONOCYTES NFR BLD: 13.3 %
MONOCYTES NFR BLD: 14.8 %
MONOCYTES NFR BLD: 17 %
MONOCYTES NFR BLD: 22 %
MONOCYTES NFR BLD: 3 %
MONOCYTES NFR BLD: 5.4 %
MONOCYTES NFR BLD: 5.5 %
MONOCYTES NFR BLD: 6.5 %
MONOCYTES NFR BLD: 6.8 %
MONOCYTES NFR BLD: 7.1 %
MONOCYTES NFR BLD: 7.7 %
MONOCYTES NFR BLD: 8.8 %
MONOCYTES NFR BLD: 9.2 %
MONOS+MACROS NFR FLD MANUAL: 47 %
MORAXELLA CATARRHALIS: NOT DETECTED
MYCOBACTERIUM SPEC QL CULT: NORMAL
MYCOBACTERIUM SPEC QL CULT: NORMAL
MYELOCYTES NFR BLD MANUAL: 3 %
MYELOCYTES NFR BLD MANUAL: 4 %
NEUTROPHILS # BLD AUTO: 11.8 K/UL
NEUTROPHILS # BLD AUTO: 16.6 K/UL
NEUTROPHILS # BLD AUTO: 17 K/UL
NEUTROPHILS # BLD AUTO: 17.3 K/UL
NEUTROPHILS # BLD AUTO: 17.8 K/UL
NEUTROPHILS # BLD AUTO: 18 K/UL
NEUTROPHILS # BLD AUTO: 2 K/UL
NEUTROPHILS # BLD AUTO: 3.2 K/UL
NEUTROPHILS # BLD AUTO: 3.3 K/UL
NEUTROPHILS # BLD AUTO: 3.8 K/UL
NEUTROPHILS # BLD AUTO: 5.1 K/UL
NEUTROPHILS # BLD AUTO: 5.5 K/UL
NEUTROPHILS # BLD AUTO: ABNORMAL K/UL
NEUTROPHILS NFR BLD: 54 %
NEUTROPHILS NFR BLD: 56.5 %
NEUTROPHILS NFR BLD: 68.7 %
NEUTROPHILS NFR BLD: 68.8 %
NEUTROPHILS NFR BLD: 69 %
NEUTROPHILS NFR BLD: 73.4 %
NEUTROPHILS NFR BLD: 74.2 %
NEUTROPHILS NFR BLD: 76.1 %
NEUTROPHILS NFR BLD: 77 %
NEUTROPHILS NFR BLD: 80.5 %
NEUTROPHILS NFR BLD: 83.3 %
NEUTROPHILS NFR BLD: 84.7 %
NEUTROPHILS NFR BLD: 85.7 %
NEUTROPHILS NFR BLD: 86.3 %
NEUTROPHILS NFR FLD MANUAL: 2 %
NEUTS BAND NFR BLD MANUAL: 2 %
NEUTS BAND NFR BLD MANUAL: 4 %
NITRITE UR QL STRIP: NEGATIVE
NITRITE UR QL STRIP: NEGATIVE
NRBC BLD-RTO: 0 /100 WBC
NRBC BLD-RTO: 10 /100 WBC
NRBC BLD-RTO: 14 /100 WBC
NRBC BLD-RTO: 15 /100 WBC
NRBC BLD-RTO: 16 /100 WBC
NRBC BLD-RTO: 17 /100 WBC
NRBC BLD-RTO: 19 /100 WBC
NRBC BLD-RTO: 2 /100 WBC
NRBC BLD-RTO: 21 /100 WBC
NRBC BLD-RTO: 22 /100 WBC
NUM UNITS TRANS PACKED RBC: NORMAL
OVALOCYTES BLD QL SMEAR: ABNORMAL
PARAINFLUENZA: NOT DETECTED
PCO2 BLDA: 25.1 MMHG (ref 35–45)
PCO2 BLDA: 29.2 MMHG (ref 35–45)
PH SMN: 7.4 [PH] (ref 7.35–7.45)
PH SMN: 7.47 [PH] (ref 7.35–7.45)
PH UR STRIP: 5 [PH] (ref 5–8)
PH UR STRIP: 5 [PH] (ref 5–8)
PHOSPHATE SERPL-MCNC: 2.7 MG/DL
PHOSPHATE SERPL-MCNC: 2.8 MG/DL
PHOSPHATE SERPL-MCNC: 4.4 MG/DL
PHOSPHATE SERPL-MCNC: 4.8 MG/DL
PLATELET # BLD AUTO: 102 K/UL
PLATELET # BLD AUTO: 108 K/UL
PLATELET # BLD AUTO: 112 K/UL
PLATELET # BLD AUTO: 113 K/UL
PLATELET # BLD AUTO: 117 K/UL
PLATELET # BLD AUTO: 122 K/UL
PLATELET # BLD AUTO: 124 K/UL
PLATELET # BLD AUTO: 127 K/UL
PLATELET # BLD AUTO: 128 K/UL
PLATELET # BLD AUTO: 84 K/UL
PLATELET # BLD AUTO: 88 K/UL
PLATELET # BLD AUTO: 92 K/UL
PLATELET # BLD AUTO: 92 K/UL
PLATELET # BLD AUTO: 94 K/UL
PLATELET BLD QL SMEAR: ABNORMAL
PMV BLD AUTO: 10.1 FL
PMV BLD AUTO: 10.3 FL
PMV BLD AUTO: 10.4 FL
PMV BLD AUTO: 10.7 FL
PMV BLD AUTO: 10.8 FL
PMV BLD AUTO: 10.9 FL
PMV BLD AUTO: 11 FL
PMV BLD AUTO: 11 FL
PMV BLD AUTO: 11.1 FL
PMV BLD AUTO: 11.1 FL
PMV BLD AUTO: 11.3 FL
PMV BLD AUTO: 11.5 FL
PO2 BLDA: 140 MMHG (ref 80–100)
PO2 BLDA: 18 MMHG (ref 40–60)
POC BE: -5 MMOL/L
POC BE: -7 MMOL/L
POC SATURATED O2: 28 % (ref 95–100)
POC SATURATED O2: 99 % (ref 95–100)
POC TCO2: 19 MMOL/L (ref 23–27)
POC TCO2: 19 MMOL/L (ref 24–29)
POCT GLUCOSE: 136 MG/DL (ref 70–110)
POCT GLUCOSE: 142 MG/DL (ref 70–110)
POCT GLUCOSE: 160 MG/DL (ref 70–110)
POCT GLUCOSE: 165 MG/DL (ref 70–110)
POCT GLUCOSE: 179 MG/DL (ref 70–110)
POCT GLUCOSE: 181 MG/DL (ref 70–110)
POCT GLUCOSE: 195 MG/DL (ref 70–110)
POCT GLUCOSE: 202 MG/DL (ref 70–110)
POCT GLUCOSE: 313 MG/DL (ref 70–110)
POCT GLUCOSE: 89 MG/DL (ref 70–110)
POIKILOCYTOSIS BLD QL SMEAR: SLIGHT
POLYCHROMASIA BLD QL SMEAR: ABNORMAL
POTASSIUM SERPL-SCNC: 4.3 MMOL/L
POTASSIUM SERPL-SCNC: 4.4 MMOL/L
POTASSIUM SERPL-SCNC: 5.1 MMOL/L
POTASSIUM SERPL-SCNC: 5.4 MMOL/L
POTASSIUM SERPL-SCNC: 5.4 MMOL/L
POTASSIUM SERPL-SCNC: 5.6 MMOL/L
POTASSIUM SERPL-SCNC: 5.7 MMOL/L
POTASSIUM SERPL-SCNC: 5.9 MMOL/L
POTASSIUM SERPL-SCNC: 6.9 MMOL/L
POTASSIUM SERPL-SCNC: 7.3 MMOL/L
POTASSIUM SERPL-SCNC: 7.6 MMOL/L
POTASSIUM SERPL-SCNC: 7.6 MMOL/L
PRE FEV1 FVC: 85
PRE FEV1: 2.02
PRE FVC: 2.37
PREDICTED FEV1 FVC: 78
PREDICTED FEV1: 2.63
PREDICTED FVC: 3.33
PROT SERPL-MCNC: 4.3 G/DL
PROT SERPL-MCNC: 4.8 G/DL
PROT SERPL-MCNC: 5.1 G/DL
PROT SERPL-MCNC: 5.8 G/DL
PROT SERPL-MCNC: 6 G/DL
PROT SERPL-MCNC: 6 G/DL
PROT SERPL-MCNC: 6.5 G/DL
PROT UR QL STRIP: ABNORMAL
PROT UR QL STRIP: ABNORMAL
PROT UR-MCNC: 134 MG/DL
PROT/CREAT RATIO, UR: 0.84
PROTHROMBIN TIME: 18.7 SEC
PROTHROMBIN TIME: 18.8 SEC
RBC # BLD AUTO: 1.38 M/UL
RBC # BLD AUTO: 1.82 M/UL
RBC # BLD AUTO: 2.35 M/UL
RBC # BLD AUTO: 2.44 M/UL
RBC # BLD AUTO: 2.44 M/UL
RBC # BLD AUTO: 2.49 M/UL
RBC # BLD AUTO: 2.51 M/UL
RBC # BLD AUTO: 2.54 M/UL
RBC # BLD AUTO: 2.55 M/UL
RBC # BLD AUTO: 2.6 M/UL
RBC # BLD AUTO: 2.68 M/UL
RBC # BLD AUTO: 3.14 M/UL
RBC # BLD AUTO: 3.16 M/UL
RBC # BLD AUTO: 3.25 M/UL
RBC #/AREA URNS AUTO: 2 /HPF (ref 0–4)
RBC #/AREA URNS AUTO: 3 /HPF (ref 0–4)
RVP - ADENOVIRUS: NOT DETECTED
RVP - HUMAN METAPNEUMOVIRUS (HMPV): NOT DETECTED
RVP - INFLUENZA A: NOT DETECTED
RVP - INFLUENZA B: NOT DETECTED
RVP - RESPIRATORY SYNCTIAL VIRUS (RSV) A: NOT DETECTED
RVP - RESPIRATORY VIRAL PANEL, SOURCE: NORMAL
RVP - RHINOVIRUS: NOT DETECTED
SAMPLE: ABNORMAL
SAMPLE: ABNORMAL
SCHISTOCYTES BLD QL SMEAR: ABNORMAL
SCHISTOCYTES BLD QL SMEAR: ABNORMAL
SCHISTOCYTES BLD QL SMEAR: PRESENT
SCHISTOCYTES BLD QL SMEAR: PRESENT
SITE: ABNORMAL
SITE: ABNORMAL
SMUDGE CELLS BLD QL SMEAR: PRESENT
SMUDGE CELLS BLD QL SMEAR: PRESENT
SODIUM SERPL-SCNC: 130 MMOL/L
SODIUM SERPL-SCNC: 132 MMOL/L
SODIUM SERPL-SCNC: 132 MMOL/L
SODIUM SERPL-SCNC: 133 MMOL/L
SODIUM SERPL-SCNC: 134 MMOL/L
SODIUM SERPL-SCNC: 134 MMOL/L
SODIUM SERPL-SCNC: 135 MMOL/L
SODIUM SERPL-SCNC: 136 MMOL/L
SODIUM SERPL-SCNC: 137 MMOL/L
SODIUM SERPL-SCNC: 139 MMOL/L
SODIUM SERPL-SCNC: 140 MMOL/L
SODIUM SERPL-SCNC: 143 MMOL/L
SODIUM UR-SCNC: 105 MMOL/L
SP GR UR STRIP: 1.02 (ref 1–1.03)
SP GR UR STRIP: 1.02 (ref 1–1.03)
SP02: 87
SPECIMEN SOURCE: NORMAL
SPHEROCYTES BLD QL SMEAR: ABNORMAL
SQUAMOUS #/AREA URNS AUTO: 15 /HPF
SQUAMOUS #/AREA URNS AUTO: 26 /HPF
TACROLIMUS BLD-MCNC: 3.2 NG/ML
TACROLIMUS BLD-MCNC: 3.6 NG/ML
TACROLIMUS BLD-MCNC: 7.3 NG/ML
TARGETS BLD QL SMEAR: ABNORMAL
TEM - ACINETOBACTER BAUMANNII: NOT DETECTED
TEM - BORDETELLA PERTUSSIS: NOT DETECTED
TEM - CHLAMYDOPHILA PNEUMONIAE: NOT DETECTED
TEM - KLEBSIELLA PNEUMONIAE: NOT DETECTED
TEM - MRSA: NOT DETECTED
TEM - MYCOPLASMA PNEUMONIAE: NOT DETECTED
TEM - NEISSERIA MENINGITIDIS: NOT DETECTED
TEM - PANTON-VALENTINE: NOT DETECTED
TEM - PSEUDOMONAS AERUGINOSA: NOT DETECTED
TEM - STAPHYLOCOCCUS AUREUS: NOT DETECTED
TEM - STREPTOCOCCUS PNEUMONIAE: NOT DETECTED
TEM - STREPTOCOCCUS PYOGENES A: NOT DETECTED
TEM- HAEMOPHILUS INFLUENZAE B: NOT DETECTED
TEM- HAEMOPHILUS INFLUENZAE: NOT DETECTED
TROPONIN I SERPL DL<=0.01 NG/ML-MCNC: 0.01 NG/ML
TSH SERPL DL<=0.005 MIU/L-ACNC: 0.64 UIU/ML
UPPER 95% CONFIDENCE INTERVAL: NORMAL
URATE SERPL-MCNC: 0.7 MG/DL
URATE SERPL-MCNC: 11.9 MG/DL
URATE SERPL-MCNC: 2.1 MG/DL
URATE SERPL-MCNC: 2.2 MG/DL
URATE SERPL-MCNC: 4.7 MG/DL
URN SPEC COLLECT METH UR: ABNORMAL
URN SPEC COLLECT METH UR: ABNORMAL
UROBILINOGEN UR STRIP-ACNC: NEGATIVE EU/DL
UROBILINOGEN UR STRIP-ACNC: NEGATIVE EU/DL
VIT B12 SERPL-MCNC: >2000 PG/ML
WBC # BLD AUTO: 15.63 K/UL
WBC # BLD AUTO: 16.1 K/UL
WBC # BLD AUTO: 19.24 K/UL
WBC # BLD AUTO: 19.79 K/UL
WBC # BLD AUTO: 20.7 K/UL
WBC # BLD AUTO: 21.24 K/UL
WBC # BLD AUTO: 22.12 K/UL
WBC # BLD AUTO: 3.55 K/UL
WBC # BLD AUTO: 31.86 K/UL
WBC # BLD AUTO: 4.59 K/UL
WBC # BLD AUTO: 4.81 K/UL
WBC # BLD AUTO: 5.5 K/UL
WBC # BLD AUTO: 6.93 K/UL
WBC # BLD AUTO: 7.24 K/UL
WBC # FLD: 430 /CU MM
WBC #/AREA URNS AUTO: 15 /HPF (ref 0–5)
WBC #/AREA URNS AUTO: 20 /HPF (ref 0–5)

## 2018-01-01 PROCEDURE — 85025 COMPLETE CBC W/AUTO DIFF WBC: CPT

## 2018-01-01 PROCEDURE — 94761 N-INVAS EAR/PLS OXIMETRY MLT: CPT

## 2018-01-01 PROCEDURE — 99233 SBSQ HOSP IP/OBS HIGH 50: CPT | Mod: ,,, | Performed by: INTERNAL MEDICINE

## 2018-01-01 PROCEDURE — 85027 COMPLETE CBC AUTOMATED: CPT

## 2018-01-01 PROCEDURE — 63600175 PHARM REV CODE 636 W HCPCS: Performed by: PHYSICIAN ASSISTANT

## 2018-01-01 PROCEDURE — 84443 ASSAY THYROID STIM HORMONE: CPT

## 2018-01-01 PROCEDURE — 84100 ASSAY OF PHOSPHORUS: CPT | Mod: 91

## 2018-01-01 PROCEDURE — 82570 ASSAY OF URINE CREATININE: CPT

## 2018-01-01 PROCEDURE — 80053 COMPREHEN METABOLIC PANEL: CPT

## 2018-01-01 PROCEDURE — P9038 RBC IRRADIATED: HCPCS

## 2018-01-01 PROCEDURE — 85384 FIBRINOGEN ACTIVITY: CPT

## 2018-01-01 PROCEDURE — 36415 COLL VENOUS BLD VENIPUNCTURE: CPT

## 2018-01-01 PROCEDURE — 63600175 PHARM REV CODE 636 W HCPCS: Performed by: INTERNAL MEDICINE

## 2018-01-01 PROCEDURE — 25000003 PHARM REV CODE 250: Performed by: INTERNAL MEDICINE

## 2018-01-01 PROCEDURE — 94010 BREATHING CAPACITY TEST: CPT | Mod: S$GLB,,, | Performed by: INTERNAL MEDICINE

## 2018-01-01 PROCEDURE — 83735 ASSAY OF MAGNESIUM: CPT | Mod: 91

## 2018-01-01 PROCEDURE — 87070 CULTURE OTHR SPECIMN AEROBIC: CPT | Mod: 59

## 2018-01-01 PROCEDURE — 88305 TISSUE EXAM BY PATHOLOGIST: CPT | Performed by: PATHOLOGY

## 2018-01-01 PROCEDURE — G0378 HOSPITAL OBSERVATION PER HR: HCPCS

## 2018-01-01 PROCEDURE — 87086 URINE CULTURE/COLONY COUNT: CPT

## 2018-01-01 PROCEDURE — 80053 COMPREHEN METABOLIC PANEL: CPT | Mod: 91

## 2018-01-01 PROCEDURE — 86920 COMPATIBILITY TEST SPIN: CPT

## 2018-01-01 PROCEDURE — 84550 ASSAY OF BLOOD/URIC ACID: CPT

## 2018-01-01 PROCEDURE — 85007 BL SMEAR W/DIFF WBC COUNT: CPT | Mod: 91

## 2018-01-01 PROCEDURE — 82248 BILIRUBIN DIRECT: CPT

## 2018-01-01 PROCEDURE — 25000003 PHARM REV CODE 250: Performed by: STUDENT IN AN ORGANIZED HEALTH CARE EDUCATION/TRAINING PROGRAM

## 2018-01-01 PROCEDURE — 99999 PR PBB SHADOW E&M-EST. PATIENT-LVL III: CPT | Mod: PBBFAC,,, | Performed by: INTERNAL MEDICINE

## 2018-01-01 PROCEDURE — 25000003 PHARM REV CODE 250: Performed by: PHYSICIAN ASSISTANT

## 2018-01-01 PROCEDURE — C9113 INJ PANTOPRAZOLE SODIUM, VIA: HCPCS | Performed by: INTERNAL MEDICINE

## 2018-01-01 PROCEDURE — 99223 1ST HOSP IP/OBS HIGH 75: CPT | Mod: AI,,, | Performed by: INTERNAL MEDICINE

## 2018-01-01 PROCEDURE — 99231 SBSQ HOSP IP/OBS SF/LOW 25: CPT | Mod: ,,, | Performed by: INTERNAL MEDICINE

## 2018-01-01 PROCEDURE — 88313 SPECIAL STAINS GROUP 2: CPT | Mod: 26,,, | Performed by: PATHOLOGY

## 2018-01-01 PROCEDURE — 3074F SYST BP LT 130 MM HG: CPT | Mod: CPTII,S$GLB,, | Performed by: INTERNAL MEDICINE

## 2018-01-01 PROCEDURE — 85730 THROMBOPLASTIN TIME PARTIAL: CPT

## 2018-01-01 PROCEDURE — 36430 TRANSFUSION BLD/BLD COMPNT: CPT

## 2018-01-01 PROCEDURE — 99499 UNLISTED E&M SERVICE: CPT | Mod: S$GLB,,, | Performed by: INTERNAL MEDICINE

## 2018-01-01 PROCEDURE — 87040 BLOOD CULTURE FOR BACTERIA: CPT

## 2018-01-01 PROCEDURE — 93010 ELECTROCARDIOGRAM REPORT: CPT | Mod: ,,, | Performed by: INTERNAL MEDICINE

## 2018-01-01 PROCEDURE — S0030 INJECTION, METRONIDAZOLE: HCPCS | Performed by: INTERNAL MEDICINE

## 2018-01-01 PROCEDURE — 82550 ASSAY OF CK (CPK): CPT

## 2018-01-01 PROCEDURE — 80197 ASSAY OF TACROLIMUS: CPT

## 2018-01-01 PROCEDURE — 87799 DETECT AGENT NOS DNA QUANT: CPT

## 2018-01-01 PROCEDURE — 80048 BASIC METABOLIC PNL TOTAL CA: CPT | Mod: 91

## 2018-01-01 PROCEDURE — 99223 1ST HOSP IP/OBS HIGH 75: CPT | Mod: GC,,, | Performed by: INTERNAL MEDICINE

## 2018-01-01 PROCEDURE — 99215 OFFICE O/P EST HI 40 MIN: CPT | Mod: GC,,, | Performed by: INTERNAL MEDICINE

## 2018-01-01 PROCEDURE — 99233 SBSQ HOSP IP/OBS HIGH 50: CPT | Mod: GC,,, | Performed by: INTERNAL MEDICINE

## 2018-01-01 PROCEDURE — 86860 RBC ANTIBODY ELUTION: CPT

## 2018-01-01 PROCEDURE — 83735 ASSAY OF MAGNESIUM: CPT

## 2018-01-01 PROCEDURE — 85025 COMPLETE CBC W/AUTO DIFF WBC: CPT | Mod: PO

## 2018-01-01 PROCEDURE — 97802 MEDICAL NUTRITION INDIV IN: CPT | Performed by: DIETITIAN, REGISTERED

## 2018-01-01 PROCEDURE — 31623 DX BRONCHOSCOPE/BRUSH: CPT | Mod: 59,LT,, | Performed by: INTERNAL MEDICINE

## 2018-01-01 PROCEDURE — 86644 CMV ANTIBODY: CPT

## 2018-01-01 PROCEDURE — 27000221 HC OXYGEN, UP TO 24 HOURS

## 2018-01-01 PROCEDURE — 99283 EMERGENCY DEPT VISIT LOW MDM: CPT | Mod: 25

## 2018-01-01 PROCEDURE — 93005 ELECTROCARDIOGRAM TRACING: CPT

## 2018-01-01 PROCEDURE — 85379 FIBRIN DEGRADATION QUANT: CPT

## 2018-01-01 PROCEDURE — 31624 DX BRONCHOSCOPE/LAVAGE: CPT | Mod: 59,RT,, | Performed by: INTERNAL MEDICINE

## 2018-01-01 PROCEDURE — 88305 TISSUE EXAM BY PATHOLOGIST: CPT | Mod: 26,,, | Performed by: PATHOLOGY

## 2018-01-01 PROCEDURE — 83605 ASSAY OF LACTIC ACID: CPT

## 2018-01-01 PROCEDURE — 96361 HYDRATE IV INFUSION ADD-ON: CPT

## 2018-01-01 PROCEDURE — 20000000 HC ICU ROOM

## 2018-01-01 PROCEDURE — 3078F DIAST BP <80 MM HG: CPT | Mod: CPTII,S$GLB,, | Performed by: INTERNAL MEDICINE

## 2018-01-01 PROCEDURE — 82607 VITAMIN B-12: CPT

## 2018-01-01 PROCEDURE — 85610 PROTHROMBIN TIME: CPT

## 2018-01-01 PROCEDURE — 20600001 HC STEP DOWN PRIVATE ROOM

## 2018-01-01 PROCEDURE — 89051 BODY FLUID CELL COUNT: CPT

## 2018-01-01 PROCEDURE — 80048 BASIC METABOLIC PNL TOTAL CA: CPT

## 2018-01-01 PROCEDURE — 63600175 PHARM REV CODE 636 W HCPCS: Performed by: STUDENT IN AN ORGANIZED HEALTH CARE EDUCATION/TRAINING PROGRAM

## 2018-01-01 PROCEDURE — 36415 COLL VENOUS BLD VENIPUNCTURE: CPT | Mod: PO

## 2018-01-01 PROCEDURE — 27201011 HC FORCEPS DISPOSABLE: Performed by: INTERNAL MEDICINE

## 2018-01-01 PROCEDURE — 87305 ASPERGILLUS AG IA: CPT

## 2018-01-01 PROCEDURE — 88312 SPECIAL STAINS GROUP 1: CPT | Mod: 26,,, | Performed by: PATHOLOGY

## 2018-01-01 PROCEDURE — 83010 ASSAY OF HAPTOGLOBIN QUANT: CPT

## 2018-01-01 PROCEDURE — 87486 CHLMYD PNEUM DNA AMP PROBE: CPT

## 2018-01-01 PROCEDURE — 86901 BLOOD TYPING SEROLOGIC RH(D): CPT

## 2018-01-01 PROCEDURE — 82803 BLOOD GASES ANY COMBINATION: CPT

## 2018-01-01 PROCEDURE — 82746 ASSAY OF FOLIC ACID SERUM: CPT

## 2018-01-01 PROCEDURE — 87496 CYTOMEG DNA AMP PROBE: CPT

## 2018-01-01 PROCEDURE — 87116 MYCOBACTERIA CULTURE: CPT

## 2018-01-01 PROCEDURE — 85025 COMPLETE CBC W/AUTO DIFF WBC: CPT | Mod: 91

## 2018-01-01 PROCEDURE — 31628 BRONCHOSCOPY/LUNG BX EACH: CPT | Mod: RT,,, | Performed by: INTERNAL MEDICINE

## 2018-01-01 PROCEDURE — 87205 SMEAR GRAM STAIN: CPT | Mod: 59

## 2018-01-01 PROCEDURE — 82140 ASSAY OF AMMONIA: CPT

## 2018-01-01 PROCEDURE — 99285 EMERGENCY DEPT VISIT HI MDM: CPT | Mod: 25

## 2018-01-01 PROCEDURE — 31623 DX BRONCHOSCOPE/BRUSH: CPT | Performed by: INTERNAL MEDICINE

## 2018-01-01 PROCEDURE — 87102 FUNGUS ISOLATION CULTURE: CPT

## 2018-01-01 PROCEDURE — 82955 ASSAY OF G6PD ENZYME: CPT

## 2018-01-01 PROCEDURE — 99232 SBSQ HOSP IP/OBS MODERATE 35: CPT | Mod: ,,, | Performed by: INTERNAL MEDICINE

## 2018-01-01 PROCEDURE — 84300 ASSAY OF URINE SODIUM: CPT

## 2018-01-01 PROCEDURE — 87015 SPECIMEN INFECT AGNT CONCNTJ: CPT | Mod: 59

## 2018-01-01 PROCEDURE — 88342 IMHCHEM/IMCYTCHM 1ST ANTB: CPT | Mod: 26,,, | Performed by: PATHOLOGY

## 2018-01-01 PROCEDURE — 27201040 HC RC 50 FILTER

## 2018-01-01 PROCEDURE — 88341 IMHCHEM/IMCYTCHM EA ADD ANTB: CPT | Performed by: PATHOLOGY

## 2018-01-01 PROCEDURE — 25000003 PHARM REV CODE 250

## 2018-01-01 PROCEDURE — 88365 INSITU HYBRIDIZATION (FISH): CPT | Mod: 26,,, | Performed by: PATHOLOGY

## 2018-01-01 PROCEDURE — 71046 X-RAY EXAM CHEST 2 VIEWS: CPT | Mod: 26,,, | Performed by: RADIOLOGY

## 2018-01-01 PROCEDURE — 63600175 PHARM REV CODE 636 W HCPCS

## 2018-01-01 PROCEDURE — 36600 WITHDRAWAL OF ARTERIAL BLOOD: CPT

## 2018-01-01 PROCEDURE — 86880 COOMBS TEST DIRECT: CPT

## 2018-01-01 PROCEDURE — 96360 HYDRATION IV INFUSION INIT: CPT

## 2018-01-01 PROCEDURE — 99235 HOSP IP/OBS SAME DATE MOD 70: CPT | Mod: ,,, | Performed by: INTERNAL MEDICINE

## 2018-01-01 PROCEDURE — 84100 ASSAY OF PHOSPHORUS: CPT

## 2018-01-01 PROCEDURE — 99283 EMERGENCY DEPT VISIT LOW MDM: CPT | Mod: ,,, | Performed by: EMERGENCY MEDICINE

## 2018-01-01 PROCEDURE — 99900035 HC TECH TIME PER 15 MIN (STAT)

## 2018-01-01 PROCEDURE — 83615 LACTATE (LD) (LDH) ENZYME: CPT

## 2018-01-01 PROCEDURE — 31628 BRONCHOSCOPY/LUNG BX EACH: CPT | Performed by: INTERNAL MEDICINE

## 2018-01-01 PROCEDURE — 99153 MOD SED SAME PHYS/QHP EA: CPT | Performed by: INTERNAL MEDICINE

## 2018-01-01 PROCEDURE — 71046 X-RAY EXAM CHEST 2 VIEWS: CPT | Mod: TC,FY

## 2018-01-01 PROCEDURE — 99214 OFFICE O/P EST MOD 30 MIN: CPT | Mod: 25,S$GLB,, | Performed by: INTERNAL MEDICINE

## 2018-01-01 PROCEDURE — 84550 ASSAY OF BLOOD/URIC ACID: CPT | Mod: 91

## 2018-01-01 PROCEDURE — 99215 OFFICE O/P EST HI 40 MIN: CPT | Mod: S$GLB,,, | Performed by: INTERNAL MEDICINE

## 2018-01-01 PROCEDURE — 99223 1ST HOSP IP/OBS HIGH 75: CPT | Mod: ,,, | Performed by: INTERNAL MEDICINE

## 2018-01-01 PROCEDURE — 63600175 PHARM REV CODE 636 W HCPCS: Performed by: EMERGENCY MEDICINE

## 2018-01-01 PROCEDURE — 25000003 PHARM REV CODE 250: Performed by: EMERGENCY MEDICINE

## 2018-01-01 PROCEDURE — 84484 ASSAY OF TROPONIN QUANT: CPT

## 2018-01-01 PROCEDURE — 81001 URINALYSIS AUTO W/SCOPE: CPT

## 2018-01-01 PROCEDURE — 86850 RBC ANTIBODY SCREEN: CPT

## 2018-01-01 PROCEDURE — P9040 RBC LEUKOREDUCED IRRADIATED: HCPCS

## 2018-01-01 PROCEDURE — 63600175 PHARM REV CODE 636 W HCPCS: Mod: JG | Performed by: INTERNAL MEDICINE

## 2018-01-01 PROCEDURE — 99152 MOD SED SAME PHYS/QHP 5/>YRS: CPT | Performed by: INTERNAL MEDICINE

## 2018-01-01 PROCEDURE — 31624 DX BRONCHOSCOPE/LAVAGE: CPT | Performed by: INTERNAL MEDICINE

## 2018-01-01 RX ORDER — URSODIOL 300 MG/1
300 CAPSULE ORAL 2 TIMES DAILY
Status: DISCONTINUED | OUTPATIENT
Start: 2018-01-01 | End: 2018-01-01

## 2018-01-01 RX ORDER — ACETAMINOPHEN 500 MG
1000 TABLET ORAL EVERY 6 HOURS PRN
Status: DISCONTINUED | OUTPATIENT
Start: 2018-01-01 | End: 2018-01-01 | Stop reason: HOSPADM

## 2018-01-01 RX ORDER — CALCIUM GLUCONATE 94 MG/ML
2 INJECTION, SOLUTION INTRAVENOUS ONCE
Status: DISCONTINUED | OUTPATIENT
Start: 2018-01-01 | End: 2018-01-01

## 2018-01-01 RX ORDER — SODIUM CHLORIDE 9 MG/ML
INJECTION, SOLUTION INTRAVENOUS CONTINUOUS
Status: ACTIVE | OUTPATIENT
Start: 2018-01-01 | End: 2018-01-01

## 2018-01-01 RX ORDER — SODIUM BICARBONATE 1 MEQ/ML
100 SYRINGE (ML) INTRAVENOUS ONCE
Status: COMPLETED | OUTPATIENT
Start: 2018-01-01 | End: 2018-01-01

## 2018-01-01 RX ORDER — LORAZEPAM 2 MG/ML
INJECTION INTRAMUSCULAR
Status: COMPLETED
Start: 2018-01-01 | End: 2018-01-01

## 2018-01-01 RX ORDER — TRAMADOL HYDROCHLORIDE 50 MG/1
TABLET ORAL
Qty: 90 TABLET | Refills: 0 | Status: SHIPPED | OUTPATIENT
Start: 2018-01-01

## 2018-01-01 RX ORDER — PROCHLORPERAZINE EDISYLATE 5 MG/ML
10 INJECTION INTRAMUSCULAR; INTRAVENOUS ONCE
Status: DISCONTINUED | OUTPATIENT
Start: 2018-01-01 | End: 2018-01-01

## 2018-01-01 RX ORDER — METRONIDAZOLE 500 MG/100ML
500 INJECTION, SOLUTION INTRAVENOUS
Status: DISCONTINUED | OUTPATIENT
Start: 2018-01-01 | End: 2018-01-01

## 2018-01-01 RX ORDER — POTASSIUM CHLORIDE 20 MEQ/15ML
40 SOLUTION ORAL
Status: DISCONTINUED | OUTPATIENT
Start: 2018-01-01 | End: 2018-01-01

## 2018-01-01 RX ORDER — PANTOPRAZOLE SODIUM 40 MG/10ML
40 INJECTION, POWDER, LYOPHILIZED, FOR SOLUTION INTRAVENOUS EVERY 24 HOURS
Status: DISCONTINUED | OUTPATIENT
Start: 2018-01-01 | End: 2018-01-01

## 2018-01-01 RX ORDER — LORAZEPAM 2 MG/ML
2 INJECTION INTRAMUSCULAR EVERY 30 MIN PRN
Status: DISCONTINUED | OUTPATIENT
Start: 2018-01-01 | End: 2018-04-08 | Stop reason: HOSPADM

## 2018-01-01 RX ORDER — PANTOPRAZOLE SODIUM 40 MG/1
40 TABLET, DELAYED RELEASE ORAL DAILY
Status: DISCONTINUED | OUTPATIENT
Start: 2018-01-01 | End: 2018-01-01 | Stop reason: HOSPADM

## 2018-01-01 RX ORDER — MIDAZOLAM HYDROCHLORIDE 5 MG/ML
INJECTION INTRAMUSCULAR; INTRAVENOUS CODE/TRAUMA/SEDATION MEDICATION
Status: COMPLETED | OUTPATIENT
Start: 2018-01-01 | End: 2018-01-01

## 2018-01-01 RX ORDER — TACROLIMUS 0.5 MG/1
0.5 CAPSULE ORAL 2 TIMES DAILY
Status: DISCONTINUED | OUTPATIENT
Start: 2018-01-01 | End: 2018-01-01 | Stop reason: HOSPADM

## 2018-01-01 RX ORDER — ONDANSETRON 4 MG/1
4 TABLET, ORALLY DISINTEGRATING ORAL ONCE
Status: COMPLETED | OUTPATIENT
Start: 2018-01-01 | End: 2018-01-01

## 2018-01-01 RX ORDER — ONDANSETRON 2 MG/ML
4 INJECTION INTRAMUSCULAR; INTRAVENOUS EVERY 6 HOURS PRN
Status: DISCONTINUED | OUTPATIENT
Start: 2018-01-01 | End: 2018-01-01

## 2018-01-01 RX ORDER — HYDROCODONE BITARTRATE AND ACETAMINOPHEN 500; 5 MG/1; MG/1
TABLET ORAL
Status: DISCONTINUED | OUTPATIENT
Start: 2018-01-01 | End: 2018-01-01 | Stop reason: HOSPADM

## 2018-01-01 RX ORDER — SULFAMETHOXAZOLE AND TRIMETHOPRIM 800; 160 MG/1; MG/1
1 TABLET ORAL
Status: DISCONTINUED | OUTPATIENT
Start: 2018-01-01 | End: 2018-01-01 | Stop reason: HOSPADM

## 2018-01-01 RX ORDER — INSULIN ASPART 100 [IU]/ML
1-10 INJECTION, SOLUTION INTRAVENOUS; SUBCUTANEOUS EVERY 6 HOURS PRN
Status: DISCONTINUED | OUTPATIENT
Start: 2018-01-01 | End: 2018-01-01

## 2018-01-01 RX ORDER — CITALOPRAM 20 MG/1
TABLET, FILM COATED ORAL
Qty: 90 TABLET | Refills: 3 | Status: SHIPPED | OUTPATIENT
Start: 2018-01-01

## 2018-01-01 RX ORDER — DIPHENHYDRAMINE HCL 25 MG
50 CAPSULE ORAL NIGHTLY PRN
Status: DISCONTINUED | OUTPATIENT
Start: 2018-01-01 | End: 2018-01-01

## 2018-01-01 RX ORDER — LORAZEPAM 1 MG/1
1 TABLET ORAL EVERY 30 MIN PRN
Status: DISCONTINUED | OUTPATIENT
Start: 2018-01-01 | End: 2018-01-01

## 2018-01-01 RX ORDER — FUROSEMIDE 10 MG/ML
80 INJECTION INTRAMUSCULAR; INTRAVENOUS ONCE
Status: DISCONTINUED | OUTPATIENT
Start: 2018-01-01 | End: 2018-01-01

## 2018-01-01 RX ORDER — MORPHINE SULFATE 2 MG/ML
1 INJECTION, SOLUTION INTRAMUSCULAR; INTRAVENOUS
Status: DISCONTINUED | OUTPATIENT
Start: 2018-01-01 | End: 2018-04-08 | Stop reason: HOSPADM

## 2018-01-01 RX ORDER — HYDROCODONE BITARTRATE AND ACETAMINOPHEN 500; 5 MG/1; MG/1
TABLET ORAL
Status: DISCONTINUED | OUTPATIENT
Start: 2018-01-01 | End: 2018-01-01

## 2018-01-01 RX ORDER — CITALOPRAM 10 MG/1
20 TABLET ORAL DAILY
Status: DISCONTINUED | OUTPATIENT
Start: 2018-01-01 | End: 2018-01-01 | Stop reason: HOSPADM

## 2018-01-01 RX ORDER — POTASSIUM CHLORIDE 20 MEQ/15ML
40 SOLUTION ORAL
Status: DISCONTINUED | OUTPATIENT
Start: 2018-01-01 | End: 2018-01-01 | Stop reason: HOSPADM

## 2018-01-01 RX ORDER — LANOLIN ALCOHOL/MO/W.PET/CERES
400 CREAM (GRAM) TOPICAL 2 TIMES DAILY
Status: DISCONTINUED | OUTPATIENT
Start: 2018-01-01 | End: 2018-01-01

## 2018-01-01 RX ORDER — MORPHINE SULFATE 2 MG/ML
2 INJECTION, SOLUTION INTRAMUSCULAR; INTRAVENOUS EVERY 4 HOURS PRN
Status: DISCONTINUED | OUTPATIENT
Start: 2018-01-01 | End: 2018-01-01

## 2018-01-01 RX ORDER — FENTANYL CITRATE 50 UG/ML
INJECTION, SOLUTION INTRAMUSCULAR; INTRAVENOUS CODE/TRAUMA/SEDATION MEDICATION
Status: COMPLETED | OUTPATIENT
Start: 2018-01-01 | End: 2018-01-01

## 2018-01-01 RX ORDER — PREDNISONE 20 MG/1
100 TABLET ORAL ONCE
Status: DISCONTINUED | OUTPATIENT
Start: 2018-01-01 | End: 2018-01-01

## 2018-01-01 RX ORDER — SODIUM BICARBONATE 1 MEQ/ML
50 SYRINGE (ML) INTRAVENOUS ONCE
Status: COMPLETED | OUTPATIENT
Start: 2018-01-01 | End: 2018-01-01

## 2018-01-01 RX ORDER — CALCIUM CARBONATE 500(1250)
500 TABLET ORAL 2 TIMES DAILY WITH MEALS
Status: DISCONTINUED | OUTPATIENT
Start: 2018-01-01 | End: 2018-01-01 | Stop reason: HOSPADM

## 2018-01-01 RX ORDER — ONDANSETRON 2 MG/ML
4 INJECTION INTRAMUSCULAR; INTRAVENOUS ONCE
Status: DISCONTINUED | OUTPATIENT
Start: 2018-01-01 | End: 2018-01-01

## 2018-01-01 RX ORDER — ACETAMINOPHEN 500 MG
1000 TABLET ORAL EVERY 6 HOURS PRN
Status: DISCONTINUED | OUTPATIENT
Start: 2018-01-01 | End: 2018-01-01

## 2018-01-01 RX ORDER — TACROLIMUS 0.5 MG/1
0.5 CAPSULE ORAL 2 TIMES DAILY
Status: DISCONTINUED | OUTPATIENT
Start: 2018-01-01 | End: 2018-01-01

## 2018-01-01 RX ORDER — ENOXAPARIN SODIUM 100 MG/ML
40 INJECTION SUBCUTANEOUS EVERY 24 HOURS
Status: DISCONTINUED | OUTPATIENT
Start: 2018-01-01 | End: 2018-01-01

## 2018-01-01 RX ORDER — FENTANYL CITRATE 50 UG/ML
50 INJECTION, SOLUTION INTRAMUSCULAR; INTRAVENOUS
Status: COMPLETED | OUTPATIENT
Start: 2018-01-01 | End: 2018-01-01

## 2018-01-01 RX ORDER — MORPHINE SULFATE 2 MG/ML
2 INJECTION, SOLUTION INTRAMUSCULAR; INTRAVENOUS EVERY 6 HOURS PRN
Status: DISCONTINUED | OUTPATIENT
Start: 2018-01-01 | End: 2018-01-01

## 2018-01-01 RX ORDER — LIDOCAINE HYDROCHLORIDE 20 MG/ML
SOLUTION OROPHARYNGEAL CODE/TRAUMA/SEDATION MEDICATION
Status: COMPLETED | OUTPATIENT
Start: 2018-01-01 | End: 2018-01-01

## 2018-01-01 RX ORDER — FUROSEMIDE 10 MG/ML
160 INJECTION INTRAMUSCULAR; INTRAVENOUS ONCE
Status: DISCONTINUED | OUTPATIENT
Start: 2018-01-01 | End: 2018-01-01

## 2018-01-01 RX ORDER — PREDNISONE 5 MG/1
5 TABLET ORAL DAILY
Status: DISCONTINUED | OUTPATIENT
Start: 2018-01-01 | End: 2018-01-01

## 2018-01-01 RX ORDER — CEFEPIME HYDROCHLORIDE 2 G/1
2 INJECTION, POWDER, FOR SOLUTION INTRAVENOUS
Status: DISCONTINUED | OUTPATIENT
Start: 2018-01-01 | End: 2018-01-01

## 2018-01-01 RX ORDER — GLUCAGON 1 MG
1 KIT INJECTION
Status: DISCONTINUED | OUTPATIENT
Start: 2018-01-01 | End: 2018-01-01

## 2018-01-01 RX ORDER — ASPIRIN 81 MG/1
81 TABLET ORAL DAILY
Status: DISCONTINUED | OUTPATIENT
Start: 2018-01-01 | End: 2018-01-01 | Stop reason: HOSPADM

## 2018-01-01 RX ORDER — CITALOPRAM 10 MG/1
20 TABLET ORAL DAILY
Status: DISCONTINUED | OUTPATIENT
Start: 2018-01-01 | End: 2018-01-01

## 2018-01-01 RX ORDER — PREDNISONE 5 MG/1
5 TABLET ORAL DAILY
Status: DISCONTINUED | OUTPATIENT
Start: 2018-01-01 | End: 2018-01-01 | Stop reason: HOSPADM

## 2018-01-01 RX ORDER — ENOXAPARIN SODIUM 100 MG/ML
30 INJECTION SUBCUTANEOUS EVERY 24 HOURS
Status: DISCONTINUED | OUTPATIENT
Start: 2018-01-01 | End: 2018-01-01

## 2018-01-01 RX ORDER — LANOLIN ALCOHOL/MO/W.PET/CERES
400 CREAM (GRAM) TOPICAL 2 TIMES DAILY
Status: DISCONTINUED | OUTPATIENT
Start: 2018-01-01 | End: 2018-01-01 | Stop reason: HOSPADM

## 2018-01-01 RX ORDER — PANTOPRAZOLE SODIUM 40 MG/1
40 TABLET, DELAYED RELEASE ORAL DAILY
Status: DISCONTINUED | OUTPATIENT
Start: 2018-01-01 | End: 2018-01-01

## 2018-01-01 RX ORDER — LIDOCAINE HYDROCHLORIDE 10 MG/ML
INJECTION INFILTRATION; PERINEURAL CODE/TRAUMA/SEDATION MEDICATION
Status: COMPLETED | OUTPATIENT
Start: 2018-01-01 | End: 2018-01-01

## 2018-01-01 RX ORDER — TRAMADOL HYDROCHLORIDE 50 MG/1
50 TABLET ORAL EVERY 6 HOURS PRN
Status: DISCONTINUED | OUTPATIENT
Start: 2018-01-01 | End: 2018-01-01 | Stop reason: HOSPADM

## 2018-01-01 RX ORDER — TACROLIMUS 1 MG/1
1 CAPSULE ORAL 2 TIMES DAILY
Status: DISCONTINUED | OUTPATIENT
Start: 2018-01-01 | End: 2018-01-01 | Stop reason: HOSPADM

## 2018-01-01 RX ORDER — PHYTONADIONE 5 MG/1
5 TABLET ORAL DAILY
Status: DISCONTINUED | OUTPATIENT
Start: 2018-01-01 | End: 2018-01-01

## 2018-01-01 RX ORDER — ALENDRONATE SODIUM 70 MG/1
TABLET ORAL
Qty: 12 TABLET | Refills: 3 | Status: SHIPPED | OUTPATIENT
Start: 2018-01-01

## 2018-01-01 RX ORDER — DEXTROSE 50 % IN WATER (D50W) INTRAVENOUS SYRINGE
Status: COMPLETED
Start: 2018-01-01 | End: 2018-01-01

## 2018-01-01 RX ORDER — TRAZODONE HYDROCHLORIDE 50 MG/1
50 TABLET ORAL NIGHTLY PRN
Status: DISCONTINUED | OUTPATIENT
Start: 2018-01-01 | End: 2018-01-01

## 2018-01-01 RX ORDER — POTASSIUM CHLORIDE 20 MEQ/15ML
60 SOLUTION ORAL
Status: DISCONTINUED | OUTPATIENT
Start: 2018-01-01 | End: 2018-01-01

## 2018-01-01 RX ORDER — POTASSIUM CHLORIDE 20 MEQ/15ML
60 SOLUTION ORAL
Status: DISCONTINUED | OUTPATIENT
Start: 2018-01-01 | End: 2018-01-01 | Stop reason: HOSPADM

## 2018-01-01 RX ORDER — FOLIC ACID 1 MG/1
1 TABLET ORAL DAILY
Status: DISCONTINUED | OUTPATIENT
Start: 2018-01-01 | End: 2018-01-01

## 2018-01-01 RX ORDER — DIPHENHYDRAMINE HCL 50 MG
50 CAPSULE ORAL NIGHTLY PRN
Status: DISCONTINUED | OUTPATIENT
Start: 2018-01-01 | End: 2018-01-01 | Stop reason: HOSPADM

## 2018-01-01 RX ORDER — ALLOPURINOL 100 MG/1
100 TABLET ORAL DAILY
Status: DISCONTINUED | OUTPATIENT
Start: 2018-01-01 | End: 2018-01-01

## 2018-01-01 RX ORDER — FENTANYL CITRATE 50 UG/ML
100 INJECTION, SOLUTION INTRAMUSCULAR; INTRAVENOUS
Status: COMPLETED | OUTPATIENT
Start: 2018-01-01 | End: 2018-01-01

## 2018-01-01 RX ORDER — LIDOCAINE HYDROCHLORIDE 20 MG/ML
INJECTION, SOLUTION INFILTRATION; PERINEURAL CODE/TRAUMA/SEDATION MEDICATION
Status: COMPLETED | OUTPATIENT
Start: 2018-01-01 | End: 2018-01-01

## 2018-01-01 RX ORDER — HYDROCODONE BITARTRATE AND ACETAMINOPHEN 5; 325 MG/1; MG/1
1 TABLET ORAL EVERY 6 HOURS PRN
Qty: 30 TABLET | Refills: 0 | Status: SHIPPED | OUTPATIENT
Start: 2018-01-01

## 2018-01-01 RX ORDER — HYDROCODONE BITARTRATE AND ACETAMINOPHEN 5; 325 MG/1; MG/1
1 TABLET ORAL EVERY 6 HOURS PRN
Status: DISCONTINUED | OUTPATIENT
Start: 2018-01-01 | End: 2018-01-01

## 2018-01-01 RX ORDER — TACROLIMUS 0.5 MG/1
0.5 CAPSULE ORAL EVERY 12 HOURS
Qty: 180 CAPSULE | Refills: 3 | Status: SHIPPED | OUTPATIENT
Start: 2018-01-01

## 2018-01-01 RX ORDER — TRAZODONE HYDROCHLORIDE 50 MG/1
TABLET ORAL
Qty: 90 TABLET | Refills: 1 | Status: SHIPPED | OUTPATIENT
Start: 2018-01-01

## 2018-01-01 RX ORDER — ASPIRIN 81 MG/1
81 TABLET ORAL DAILY
Status: DISCONTINUED | OUTPATIENT
Start: 2018-01-01 | End: 2018-01-01

## 2018-01-01 RX ORDER — TRAMADOL HYDROCHLORIDE 50 MG/1
50 TABLET ORAL EVERY 6 HOURS PRN
Status: DISCONTINUED | OUTPATIENT
Start: 2018-01-01 | End: 2018-01-01

## 2018-01-01 RX ADMIN — CALCIUM 500 MG: 500 TABLET ORAL at 09:03

## 2018-01-01 RX ADMIN — CITALOPRAM HYDROBROMIDE 20 MG: 20 TABLET ORAL at 10:04

## 2018-01-01 RX ADMIN — SODIUM POLYSTYRENE SULFONATE 30 G: 15 SUSPENSION ORAL; RECTAL at 10:04

## 2018-01-01 RX ADMIN — TACROLIMUS 1 MG: 1 CAPSULE ORAL at 06:03

## 2018-01-01 RX ADMIN — METRONIDAZOLE 500 MG: 500 INJECTION, SOLUTION INTRAVENOUS at 09:04

## 2018-01-01 RX ADMIN — CEFEPIME 2 G: 2 INJECTION, POWDER, FOR SOLUTION INTRAVENOUS at 11:04

## 2018-01-01 RX ADMIN — CITALOPRAM HYDROBROMIDE 20 MG: 20 TABLET ORAL at 09:04

## 2018-01-01 RX ADMIN — TRAMADOL HYDROCHLORIDE 50 MG: 50 TABLET, COATED ORAL at 02:03

## 2018-01-01 RX ADMIN — FENTANYL CITRATE 100 MCG: 50 INJECTION, SOLUTION INTRAMUSCULAR; INTRAVENOUS at 05:04

## 2018-01-01 RX ADMIN — ASPIRIN 81 MG: 81 TABLET, COATED ORAL at 12:03

## 2018-01-01 RX ADMIN — MAGNESIUM OXIDE TAB 400 MG (241.3 MG ELEMENTAL MG) 400 MG: 400 (241.3 MG) TAB at 12:03

## 2018-01-01 RX ADMIN — FENTANYL CITRATE 50 MCG: 50 INJECTION, SOLUTION INTRAMUSCULAR; INTRAVENOUS at 04:04

## 2018-01-01 RX ADMIN — Medication 1 MG: at 11:04

## 2018-01-01 RX ADMIN — PANTOPRAZOLE SODIUM 40 MG: 40 TABLET, DELAYED RELEASE ORAL at 12:03

## 2018-01-01 RX ADMIN — MAGNESIUM OXIDE TAB 400 MG (241.3 MG ELEMENTAL MG) 400 MG: 400 (241.3 MG) TAB at 09:04

## 2018-01-01 RX ADMIN — LORAZEPAM 2 MG: 2 INJECTION, SOLUTION INTRAMUSCULAR; INTRAVENOUS at 10:04

## 2018-01-01 RX ADMIN — MAGNESIUM OXIDE TAB 400 MG (241.3 MG ELEMENTAL MG) 400 MG: 400 (241.3 MG) TAB at 10:04

## 2018-01-01 RX ADMIN — ACETAMINOPHEN 1000 MG: 500 TABLET ORAL at 02:03

## 2018-01-01 RX ADMIN — MAGNESIUM OXIDE TAB 400 MG (241.3 MG ELEMENTAL MG) 400 MG: 400 (241.3 MG) TAB at 09:03

## 2018-01-01 RX ADMIN — FOLIC ACID 1 MG: 1 TABLET ORAL at 09:04

## 2018-01-01 RX ADMIN — Medication 2 MG: at 09:04

## 2018-01-01 RX ADMIN — TRAMADOL HYDROCHLORIDE 50 MG: 50 TABLET, COATED ORAL at 05:03

## 2018-01-01 RX ADMIN — FENTANYL CITRATE 100 MCG: 50 INJECTION, SOLUTION INTRAMUSCULAR; INTRAVENOUS at 07:04

## 2018-01-01 RX ADMIN — TOPICAL ANESTHETIC 0.5 ML: 200 SPRAY DENTAL; PERIODONTAL at 01:01

## 2018-01-01 RX ADMIN — INSULIN HUMAN 10 UNITS: 100 INJECTION, SOLUTION PARENTERAL at 02:04

## 2018-01-01 RX ADMIN — URSODIOL 300 MG: 300 CAPSULE ORAL at 09:04

## 2018-01-01 RX ADMIN — ALLOPURINOL 100 MG: 100 TABLET ORAL at 09:04

## 2018-01-01 RX ADMIN — PANTOPRAZOLE SODIUM 40 MG: 40 INJECTION, POWDER, FOR SOLUTION INTRAVENOUS at 08:04

## 2018-01-01 RX ADMIN — SODIUM CHLORIDE 500 ML: 9 INJECTION, SOLUTION INTRAVENOUS at 09:03

## 2018-01-01 RX ADMIN — CEFEPIME 2 G: 2 INJECTION, POWDER, FOR SOLUTION INTRAVENOUS at 01:04

## 2018-01-01 RX ADMIN — METHYLPREDNISOLONE SODIUM SUCCINATE 80 MG: 40 INJECTION, POWDER, FOR SOLUTION INTRAMUSCULAR; INTRAVENOUS at 09:04

## 2018-01-01 RX ADMIN — FENTANYL CITRATE 75 MCG: 50 INJECTION, SOLUTION INTRAMUSCULAR; INTRAVENOUS at 01:01

## 2018-01-01 RX ADMIN — SODIUM BICARBONATE 100 MEQ: 84 INJECTION INTRAVENOUS at 02:04

## 2018-01-01 RX ADMIN — CITALOPRAM HYDROBROMIDE 20 MG: 10 TABLET ORAL at 09:03

## 2018-01-01 RX ADMIN — DEXTROSE MONOHYDRATE 25 G: 25 INJECTION, SOLUTION INTRAVENOUS at 04:04

## 2018-01-01 RX ADMIN — Medication 2 MG: at 12:04

## 2018-01-01 RX ADMIN — HYDROCODONE BITARTRATE AND ACETAMINOPHEN 1 TABLET: 5; 325 TABLET ORAL at 08:04

## 2018-01-01 RX ADMIN — MIDAZOLAM 3 MG: 5 INJECTION INTRAMUSCULAR; INTRAVENOUS at 01:01

## 2018-01-01 RX ADMIN — CEFEPIME 2 G: 2 INJECTION, POWDER, FOR SOLUTION INTRAVENOUS at 12:04

## 2018-01-01 RX ADMIN — INSULIN HUMAN 10 UNITS: 100 INJECTION, SOLUTION PARENTERAL at 04:04

## 2018-01-01 RX ADMIN — CITALOPRAM HYDROBROMIDE 20 MG: 10 TABLET ORAL at 12:03

## 2018-01-01 RX ADMIN — DEXTROSE MONOHYDRATE 25 G: 500 INJECTION PARENTERAL at 02:04

## 2018-01-01 RX ADMIN — LORAZEPAM 2 MG: 2 INJECTION INTRAMUSCULAR; INTRAVENOUS at 02:04

## 2018-01-01 RX ADMIN — MORPHINE SULFATE 2 MG: 2 INJECTION, SOLUTION INTRAMUSCULAR; INTRAVENOUS at 07:04

## 2018-01-01 RX ADMIN — LORAZEPAM 2 MG: 2 INJECTION INTRAMUSCULAR; INTRAVENOUS at 09:04

## 2018-01-01 RX ADMIN — LORAZEPAM 2 MG: 2 INJECTION INTRAMUSCULAR; INTRAVENOUS at 10:04

## 2018-01-01 RX ADMIN — METRONIDAZOLE 500 MG: 500 INJECTION, SOLUTION INTRAVENOUS at 04:04

## 2018-01-01 RX ADMIN — SODIUM POLYSTYRENE SULFONATE 30 G: 15 SUSPENSION ORAL; RECTAL at 07:04

## 2018-01-01 RX ADMIN — SODIUM POLYSTYRENE SULFONATE 30 G: 15 SUSPENSION ORAL; RECTAL at 05:04

## 2018-01-01 RX ADMIN — PREDNISONE 5 MG: 5 TABLET ORAL at 12:03

## 2018-01-01 RX ADMIN — SODIUM CHLORIDE 500 ML: 0.9 INJECTION, SOLUTION INTRAVENOUS at 08:03

## 2018-01-01 RX ADMIN — DEXTROSE MONOHYDRATE 25 G: 25 INJECTION, SOLUTION INTRAVENOUS at 06:04

## 2018-01-01 RX ADMIN — METHYLPREDNISOLONE SODIUM SUCCINATE 80 MG: 40 INJECTION, POWDER, FOR SOLUTION INTRAMUSCULAR; INTRAVENOUS at 08:04

## 2018-01-01 RX ADMIN — HYDROCODONE BITARTRATE AND ACETAMINOPHEN 1 TABLET: 5; 325 TABLET ORAL at 09:04

## 2018-01-01 RX ADMIN — PREDNISONE 5 MG: 5 TABLET ORAL at 09:03

## 2018-01-01 RX ADMIN — TACROLIMUS 1 MG: 1 CAPSULE ORAL at 09:03

## 2018-01-01 RX ADMIN — ENOXAPARIN SODIUM 30 MG: 30 INJECTION SUBCUTANEOUS at 05:04

## 2018-01-01 RX ADMIN — PANTOPRAZOLE SODIUM 40 MG: 40 TABLET, DELAYED RELEASE ORAL at 09:03

## 2018-01-01 RX ADMIN — CALCIUM 500 MG: 500 TABLET ORAL at 06:03

## 2018-01-01 RX ADMIN — VANCOMYCIN HYDROCHLORIDE 750 MG: 750 INJECTION, POWDER, LYOPHILIZED, FOR SOLUTION INTRAVENOUS at 12:04

## 2018-01-01 RX ADMIN — HYDROCODONE BITARTRATE AND ACETAMINOPHEN 1 TABLET: 5; 325 TABLET ORAL at 03:04

## 2018-01-01 RX ADMIN — PREDNISONE 5 MG: 5 TABLET ORAL at 10:04

## 2018-01-01 RX ADMIN — ASPIRIN 81 MG: 81 TABLET, COATED ORAL at 10:04

## 2018-01-01 RX ADMIN — ONDANSETRON 4 MG: 4 TABLET, ORALLY DISINTEGRATING ORAL at 12:04

## 2018-01-01 RX ADMIN — Medication 1 MG: at 02:04

## 2018-01-01 RX ADMIN — MORPHINE SULFATE 2 MG: 2 INJECTION, SOLUTION INTRAMUSCULAR; INTRAVENOUS at 05:04

## 2018-01-01 RX ADMIN — SODIUM CHLORIDE 1000 ML: 0.9 INJECTION, SOLUTION INTRAVENOUS at 04:04

## 2018-01-01 RX ADMIN — TRAMADOL HYDROCHLORIDE 50 MG: 50 TABLET, FILM COATED ORAL at 07:04

## 2018-01-01 RX ADMIN — Medication 1 MG: at 09:04

## 2018-01-01 RX ADMIN — LIDOCAINE HYDROCHLORIDE 5 ML: 20 SOLUTION OROPHARYNGEAL at 01:01

## 2018-01-01 RX ADMIN — SODIUM CHLORIDE: 0.9 INJECTION, SOLUTION INTRAVENOUS at 10:04

## 2018-01-01 RX ADMIN — SODIUM CHLORIDE 1000 ML: 0.9 INJECTION, SOLUTION INTRAVENOUS at 10:04

## 2018-01-01 RX ADMIN — SODIUM CHLORIDE 6 MG: 9 INJECTION, SOLUTION INTRAVENOUS at 09:04

## 2018-01-01 RX ADMIN — TRAZODONE HYDROCHLORIDE 50 MG: 50 TABLET ORAL at 10:03

## 2018-01-01 RX ADMIN — LORAZEPAM 2 MG: 2 INJECTION INTRAMUSCULAR; INTRAVENOUS at 12:04

## 2018-01-01 RX ADMIN — Medication 2 MG: at 03:04

## 2018-01-01 RX ADMIN — MAGNESIUM OXIDE TAB 400 MG (241.3 MG ELEMENTAL MG) 400 MG: 400 (241.3 MG) TAB at 10:03

## 2018-01-01 RX ADMIN — TACROLIMUS 0.5 MG: 0.5 CAPSULE ORAL at 09:03

## 2018-01-01 RX ADMIN — METRONIDAZOLE 500 MG: 500 INJECTION, SOLUTION INTRAVENOUS at 01:04

## 2018-01-01 RX ADMIN — PANTOPRAZOLE SODIUM 40 MG: 40 TABLET, DELAYED RELEASE ORAL at 09:04

## 2018-01-01 RX ADMIN — PANTOPRAZOLE SODIUM 40 MG: 40 INJECTION, POWDER, FOR SOLUTION INTRAVENOUS at 11:04

## 2018-01-01 RX ADMIN — TRAMADOL HYDROCHLORIDE 50 MG: 50 TABLET, COATED ORAL at 08:03

## 2018-01-01 RX ADMIN — DEXTROSE MONOHYDRATE 25 G: 500 INJECTION PARENTERAL at 10:04

## 2018-01-01 RX ADMIN — PANTOPRAZOLE SODIUM 40 MG: 40 TABLET, DELAYED RELEASE ORAL at 10:04

## 2018-01-01 RX ADMIN — INSULIN HUMAN 10 UNITS: 100 INJECTION, SOLUTION PARENTERAL at 10:04

## 2018-01-01 RX ADMIN — INSULIN HUMAN 8.45 UNITS: 100 INJECTION, SOLUTION PARENTERAL at 06:04

## 2018-01-01 RX ADMIN — TACROLIMUS 0.5 MG: 0.5 CAPSULE, GELATIN COATED ORAL at 06:04

## 2018-01-01 RX ADMIN — TACROLIMUS 0.5 MG: 0.5 CAPSULE ORAL at 06:03

## 2018-01-01 RX ADMIN — SODIUM CHLORIDE: 0.9 INJECTION, SOLUTION INTRAVENOUS at 04:04

## 2018-01-01 RX ADMIN — LORAZEPAM 2 MG: 2 INJECTION INTRAMUSCULAR; INTRAVENOUS at 05:04

## 2018-01-01 RX ADMIN — METRONIDAZOLE 500 MG: 500 INJECTION, SOLUTION INTRAVENOUS at 06:04

## 2018-01-01 RX ADMIN — LIDOCAINE HYDROCHLORIDE 8 ML: 10 INJECTION, SOLUTION INFILTRATION; PERINEURAL at 01:01

## 2018-01-01 RX ADMIN — SODIUM BICARBONATE 50 MEQ: 84 INJECTION INTRAVENOUS at 02:04

## 2018-01-01 RX ADMIN — SULFAMETHOXAZOLE AND TRIMETHOPRIM 1 TABLET: 800; 160 TABLET ORAL at 04:03

## 2018-01-01 RX ADMIN — METHYLPREDNISOLONE SODIUM SUCCINATE 80 MG: 40 INJECTION, POWDER, FOR SOLUTION INTRAMUSCULAR; INTRAVENOUS at 10:04

## 2018-01-01 RX ADMIN — LIDOCAINE HYDROCHLORIDE 4 ML: 20 INJECTION, SOLUTION INFILTRATION; PERINEURAL at 01:01

## 2018-01-01 RX ADMIN — ASPIRIN 81 MG: 81 TABLET, COATED ORAL at 09:03

## 2018-01-01 RX ADMIN — PHYTONADIONE 5 MG: 5 TABLET ORAL at 09:04

## 2018-01-17 NOTE — TELEPHONE ENCOUNTER
Received automated request for an appt change. Contacted the patient who stated she had to cancel her bronchoscopy, scheduled for this am due to the weather and would like to reschedule on 1/23/18 because she has taken that day off of work and will already be here for another appt in the am. Patient's bronchoscopy rescheduled on 1/23/18 for 13:00 - following instructions reviewed: NPO after 06:00 and am meds, report to [facility] at 11:30, someone else must drive home - patient verbalized her understanding and satisfaction with new appt.

## 2018-01-23 PROBLEM — T86.819 COMPLICATION OF TRANSPLANTED LUNG: Status: ACTIVE | Noted: 2018-01-01

## 2018-01-23 NOTE — H&P
[facility]  Lung Transplant  H&P    Patient Name: Lena Lynn  MRN: [id number]  Admission Date: 1/23/2018  Attending Physician: Erick Nieves MD  Primary Care Provider: Erick Nieves MD     Subjective:     History of Present Illness:  Ms. Lynn is admitted for surveillance bronchoscopy.    Past Medical History:   Diagnosis Date    Acute rejection of lung transplant 12/12/2013    CARROLL (acute kidney injury)     Anemia 8/2/2016    Anxiety     Back pain 2016    Recently seen in ED for back pain    Cirrhosis     Depression     Hyperlipidemia     Hypertension     Lung transplant complication 1/10/2014    Lymphoma 8/10/2016    Obesity     DOROTHY (obstructive sleep apnea)     Osteoporosis     Postinflammatory pulmonary fibrosis        Past Surgical History:   Procedure Laterality Date    APPENDECTOMY      Removed at the time of hysterectomy    HYSTERECTOMY      LUNG TRANSPLANT Bilateral 04/25/2012       Review of patient's allergies indicates:  No Known Allergies    PTA Medications   Medication Sig    aspirin (ECOTRIN) 81 MG EC tablet Take 1 tablet (81 mg total) by mouth once daily.    calcium carbonate (OS-JERONIMO) 600 mg (1,500 mg) Tab Take 1 tablet (600 mg total) by mouth 2 (two) times daily with meals.    citalopram (CELEXA) 20 MG tablet TAKE 1 TABLET EVERY DAY    diphenhydrAMINE (BENADRYL) 25 mg capsule Take 50 mg by mouth nightly as needed.     magnesium oxide (MAG-OX) 400 mg tablet Take 1 tablet (400 mg total) by mouth 2 (two) times daily.    multivitamin (MULTIVITAMIN) per tablet Take 1 tablet by mouth once daily.      omeprazole (PRILOSEC) 40 MG capsule Take 1 capsule (40 mg total) by mouth once daily.    predniSONE (DELTASONE) 5 MG tablet Take 1 tablet (5 mg total) by mouth once daily.    sulfamethoxazole-trimethoprim 800-160mg (BACTRIM DS) 800-160 mg Tab Take 1 tablet by mouth every Mon, Wed, Fri.    tacrolimus (PROGRAF) 0.5 MG Cap Take 1 capsule (0.5 mg total)  by mouth every 12 (twelve) hours.    tacrolimus (PROGRAF) 1 MG Cap Take 1 capsule (1 mg total) by mouth every 12 (twelve) hours. Daily doses: 1.5 mg every 12 hours    tramadol (ULTRAM) 50 mg tablet Take 1 tablet (50 mg total) by mouth every 6 (six) hours as needed for Pain.    trazodone (DESYREL) 50 MG tablet Please provide 3 months supply  I pill PRN QHS Insomnia    alendronate (FOSAMAX) 70 MG tablet TAKE 1 TABLET EVERY 7 DAYS    hydrocodone-acetaminophen 5-325mg (NORCO) 5-325 mg per tablet Take 1 tablet by mouth every 6 (six) hours as needed for Pain.     Family History     Problem Relation (Age of Onset)    Cancer Father, Brother, Maternal Uncle, Paternal Uncle    Colon cancer Father    Heart attack Paternal Aunt    Heart disease Mother    Heart failure Mother    Hypertension Father, Sister, Sister, Sister    No Known Problems Daughter, Son, Maternal Grandmother, Maternal Grandfather, Paternal Grandmother, Paternal Grandfather, Paternal Aunt, Paternal Aunt    Other Father        Social History Main Topics    Smoking status: Never Smoker    Smokeless tobacco: Never Used    Alcohol use No    Drug use: No    Sexual activity: Not Currently     Review of Systems   Constitutional: Negative for activity change, appetite change, chills, diaphoresis, fatigue and fever.   HENT: Negative for congestion, ear discharge, ear pain, facial swelling, hearing loss, mouth sores, nosebleeds, postnasal drip, rhinorrhea, sinus pressure, sore throat, trouble swallowing and voice change.    Eyes: Negative for pain, discharge, redness and itching.   Respiratory: Negative for apnea, cough, choking, chest tightness, shortness of breath, wheezing and stridor.    Cardiovascular: Negative for chest pain, palpitations and leg swelling.   Gastrointestinal: Negative for abdominal distention, abdominal pain, anal bleeding, blood in stool, constipation, diarrhea, nausea, rectal pain and vomiting.   Endocrine: Negative for cold  intolerance and heat intolerance.   Genitourinary: Negative for difficulty urinating, dysuria and flank pain.   Musculoskeletal: Negative for arthralgias, back pain, joint swelling, myalgias and neck pain.   Neurological: Negative for dizziness, tremors, light-headedness, numbness and headaches.   Psychiatric/Behavioral: Negative for agitation and hallucinations. The patient is not nervous/anxious.      Objective:     Vital Signs (Most Recent):  Temp: 97.9 °F (36.6 °C) (01/23/18 1206)  Pulse: 74 (01/23/18 1206)  Resp: 20 (01/23/18 1206)  BP: 133/83 (01/23/18 1206)  SpO2: 100 % (01/23/18 1206) Vital Signs (24h Range):  Temp:  [97.9 °F (36.6 °C)-98 °F (36.7 °C)] 97.9 °F (36.6 °C)  Pulse:  [74-81] 74  Resp:  [18-20] 20  SpO2:  [99 %-100 %] 100 %  BP: (133-140)/(83-89) 133/83     Weight: 86.6 kg (191 lb)  Body mass index is 28.21 kg/m².    No intake or output data in the 24 hours ending 01/23/18 1313    Physical Exam   Constitutional: She is oriented to person, place, and time. She appears well-developed and well-nourished. No distress.   HENT:   Head: Normocephalic and atraumatic.   Nose: Nose normal.   Mouth/Throat: Oropharynx is clear and moist. No oropharyngeal exudate.   Eyes: Conjunctivae and EOM are normal. Pupils are equal, round, and reactive to light. Right eye exhibits no discharge. Left eye exhibits no discharge. No scleral icterus.   Neck: Normal range of motion. Neck supple. No JVD present. No tracheal deviation present. No thyromegaly present.   Cardiovascular: Normal rate, regular rhythm, normal heart sounds and intact distal pulses.  Exam reveals no gallop and no friction rub.    No murmur heard.  Pulmonary/Chest: Effort normal and breath sounds normal. No stridor. No respiratory distress. She has no wheezes. She has no rales. She exhibits no tenderness.   Abdominal: Soft. Bowel sounds are normal. She exhibits no distension and no mass. There is no tenderness. There is no rebound and no guarding.    Musculoskeletal: Normal range of motion. She exhibits no edema or tenderness.   Lymphadenopathy:     She has no cervical adenopathy.   Neurological: She is alert and oriented to person, place, and time.   Skin: Skin is warm and dry. No rash noted. She is not diaphoretic. No erythema. No pallor.       Significant Labs:  CBC:    Recent Labs  Lab 01/23/18  0900   WBC 4.01   RBC 3.76*   HGB 12.3   HCT 37.9   *   *   MCH 32.7*   MCHC 32.5     BMP:    Recent Labs  Lab 01/23/18  0900      K 4.0      CO2 26   BUN 22   CREATININE 1.2   CALCIUM 10.0        I have reviewed all pertinent labs within the past 24 hours.    Diagnostic Results:  n/a    Assessment/Plan:     * Complication of transplanted lung    Will proceed with bronchoscopy  Anesthesia plan: Conscious sedation with Fentanyl and Versed.  ASA score: III  Airway assessment: MP II  No history of anesthesia complications.               Erick Nieves MD  Lung Transplant  [facility]

## 2018-01-23 NOTE — PROGRESS NOTES
Subjective:       Patient ID: Lena Lynn is a 62 y.o. female.    Chief Complaint: Lymphoma  Hematology/Hospital History  60-year-old status post lung transplant (in 2012 2/2 hypersensitivity pneumonitis, complicated by A2 rejection in 2013, on chronic pred/tacro), presented with worsening back pain x3 months, found to have a compression fracture at L4 and several lytic lesions in the left sacrum, and also T8, consistent with probable malignancy per CT chest abdomen and pelvis without contrast 8/3/16. S/p bx of T8 lesion -> DLBCL. Transferred to BMT service for treatment. Course complicated by CARROLL likely secondary to tumor lysis syndrome. She was transferred to ICU with volume overload and fever meeting severe sepsis criteria. Patient was placed on broad spectrum antibiotics. Cultures no growth to date. Received rasburicase and was placed on allopurinol. Kidney function improved. Patient was given R-CHOP 8/14-8/15. Bone marrow biopsy and LP with IT MTX were performed 8/12/16. CSF does not appear have involvement with lymphoma and bone marrow biopsy results are pending. Patient resumed prophylactic bactrim MWF, placed on cipro prophylaxis. Continued on azithromycin ppx. Lung tx managing immunosuppression with tacrolimus and prednisone.Received neulasta 8/17/16.     TODAY  Lena presents for follow-up today after completing RCHOP chemotherapy for PTLD/DLBCL. PET imaging in December 2016 at completion of therapy had NEYMAR. CBC/CMP is stable. LDH is normal.  ROS is negative.She is having an bronch today for post-transplant shortness of breath.    Fatigue   Pertinent negatives include no fatigue.     Review of Systems   Constitutional: Negative for appetite change and fatigue.   HENT: Negative.    Eyes: Negative.    Respiratory: Negative.    Cardiovascular: Negative.    Gastrointestinal: Negative.    Endocrine: Negative for cold intolerance.   Genitourinary: Negative.    Musculoskeletal: Negative for back pain.    Skin: Negative.    Allergic/Immunologic: Negative for environmental allergies, food allergies and immunocompromised state.   Neurological: Negative.    Hematological: Negative for adenopathy. Does not bruise/bleed easily.   Psychiatric/Behavioral: Negative.        Objective:      Physical Exam   Constitutional: She is oriented to person, place, and time. She appears well-developed and well-nourished.   HENT:   Head: Normocephalic and atraumatic.   Mouth/Throat: No oropharyngeal exudate.   Eyes: Conjunctivae are normal.   Neck: Normal range of motion. Neck supple.   Cardiovascular: Normal rate, regular rhythm, normal heart sounds and intact distal pulses.    No murmur heard.  Pulmonary/Chest: Effort normal. No respiratory distress.   Abdominal: She exhibits no distension. There is no tenderness.   Musculoskeletal: She exhibits no edema.   Neurological: She is alert and oriented to person, place, and time.   Skin: Skin is warm and dry.   Psychiatric: She has a normal mood and affect. Her behavior is normal. Judgment and thought content normal.   Nursing note and vitals reviewed.      Assessment:       1. Diffuse large B-cell lymphoma of lymph nodes of multiple regions    2. Lung replaced by transplant        Plan:       1. DLBCL  Completed 6 cycles of RCHOP chemotherapy   PET at end of treatment NEYMAR  Remains in complete remission by labs and physical examination    Return visit in 3 months for post-lymphoma treatment surveillance with CBC, CMP, and LDH  Interval CBC in 4 weeks.

## 2018-01-23 NOTE — PROGRESS NOTES
Received a call from Jono ESPINO With [facility], she reviewed the xray and cleared the patient to be discharged based of the chest xray findings.

## 2018-01-23 NOTE — HPI
Ms. Lynn is admitted for surveillance bronchoscopy.  Tolerated procedure well without complication.

## 2018-01-23 NOTE — PROGRESS NOTES
Patient, Lena Lynn (MRN #[id number]), presented with a recent Platelet count less than 150 K/uL consistent with the definition of thrombocytopenia (ICD10 - D69.6).    Platelets   Date Value Ref Range Status   01/23/2018 140 (L) 150 - 350 K/uL Final     The patient's thrombocytopenia was monitored, evaluated, addressed and/or treated. This addendum to the medical record is made on 01/23/2018.

## 2018-01-23 NOTE — DISCHARGE INSTRUCTIONS
Flexible Bronchoscopy  A flexible bronchoscopy is an exam of the airways of your lungs. A thin, flexible tube called a bronchoscope is used. It has a light and small camera that allow the healthcare provider to view your airways.    Before your test  · Follow your healthcare provider's instructions carefully. If you dont, the exam may be canceled. Or you may need to take it again.  · If you are taking blood-thinning medicine, ask your healthcare provider if you should stop taking the medicine before this test.  · Have no food or drink for at least 8 hours before the test. Also, avoid smoking for 24 hours before the test.  · You will need to remove any dentures or removable devices from your mouth.  · Right before the test, you will be given sedating medicines to help you relax. The medicine may be given by an IV (intravenously) into one of your veins. In addition, your nose and throat may be numbed with a special spray to help prevent gagging and coughing.  · If you are having this test as an outpatient, make sure you have an adult friend or family member to drive you home.  During your test  Bronchoscopy takes 45 to 60 minutes and includes the following steps:  · You may be given medicine (anesthesia) so that you are unconscious or asleep during the procedure.  · The healthcare provider inserts the tube into your nose or mouth.  · If you have not been given anesthesia, you might feel a gagging sensation. To help ease this feeling, you will be told to swallow or take deep breaths. Your airway will remain open even with the tube in place. But you wont be able to talk.  · The provider checks your breathing passage. He or she may also remove tiny tissue samples for biopsy.  After your test  · You may have a mild sore throat or cough. Your voice may also be hoarse.  · Don't eat or drink until the anesthesia wears off.  · If you had a biopsy, you might see traces of blood being coughed up.  When to call your  healthcare provider  Call your healthcare provider right away if you have any of the following:  · Shortness of breath  · Chest pain  · Bleeding from your nose or throat  · Coughing up a large amount of blood  · A fever above 100.4°F (38°C) for more than 24 hours  Call 911  Call 911 if you have:  · Chest pain  · Severe shortness of breath   Date Last Reviewed: 12/1/2016  © 1331-2737 [employer]. [address] [place] [address], [place] [address]. All rights reserved. This information is not intended as a substitute for professional medical care. Always follow your healthcare professional's instructions.

## 2018-01-23 NOTE — DISCHARGE SUMMARY
[facility]  Lung Transplant  Discharge Summary      Patient Name: Lena Lynn  MRN: [id number]  Admission Date: 1/23/2018  Hospital Length of Stay: 0 days  Discharge Date and Time: 01/23/2018 1:34 PM  Attending Physician: Erick Nieves MD   Discharging Provider: Shavon Batres DO  Primary Care Provider: Erick Nieves MD     HPI: Ms. Lynn is admitted for surveillance bronchoscopy.  Tolerated procedure well without complication.    Procedure(s) (LRB):  flexible bronchoscopy with tissue biopsy CPT 07836 (N/A)     Hospital Course: No notes on file        Significant Diagnostic Studies: Bronchoscopy: tolerated well    Pending Diagnostic Studies:     Procedure Component Value Units Date/Time    X-Ray Chest AP Portable [[id number]]     Order Status:  Sent Lab Status:  No result         Final Active Diagnoses:    Diagnosis Date Noted POA    PRINCIPAL PROBLEM:  Complication of transplanted lung [T86.819] 01/23/2018 Yes      Problems Resolved During this Admission:    Diagnosis Date Noted Date Resolved POA      Discharged Condition: good    Disposition: Home or Self Care    Medications:  Reconciled Home Medications:   Current Discharge Medication List      CONTINUE these medications which have NOT CHANGED    Details   aspirin (ECOTRIN) 81 MG EC tablet Take 1 tablet (81 mg total) by mouth once daily.  Qty: 90 tablet, Refills: 3    Associated Diagnoses: Lung replaced by transplant      calcium carbonate (OS-JERONIMO) 600 mg (1,500 mg) Tab Take 1 tablet (600 mg total) by mouth 2 (two) times daily with meals.  Qty: 180 tablet, Refills: 3    Associated Diagnoses: Lung replaced by transplant      citalopram (CELEXA) 20 MG tablet TAKE 1 TABLET EVERY DAY  Qty: 90 tablet, Refills: 3      diphenhydrAMINE (BENADRYL) 25 mg capsule Take 50 mg by mouth nightly as needed.       magnesium oxide (MAG-OX) 400 mg tablet Take 1 tablet (400 mg total) by mouth 2 (two) times daily.  Qty: 180 tablet, Refills: 3     Associated Diagnoses: Lung replaced by transplant; Hypomagnesemia      multivitamin (MULTIVITAMIN) per tablet Take 1 tablet by mouth once daily.        omeprazole (PRILOSEC) 40 MG capsule Take 1 capsule (40 mg total) by mouth once daily.  Qty: 90 capsule, Refills: 3    Associated Diagnoses: Esophageal reflux      predniSONE (DELTASONE) 5 MG tablet Take 1 tablet (5 mg total) by mouth once daily.  Qty: 90 tablet, Refills: 3    Associated Diagnoses: Lung replaced by transplant      sulfamethoxazole-trimethoprim 800-160mg (BACTRIM DS) 800-160 mg Tab Take 1 tablet by mouth every Mon, Wed, Fri.  Qty: 36 tablet, Refills: 4    Associated Diagnoses: Lung replaced by transplant      !! tacrolimus (PROGRAF) 0.5 MG Cap Take 1 capsule (0.5 mg total) by mouth every 12 (twelve) hours.  Qty: 180 capsule, Refills: 3    Associated Diagnoses: Lung replaced by transplant      !! tacrolimus (PROGRAF) 1 MG Cap Take 1 capsule (1 mg total) by mouth every 12 (twelve) hours. Daily doses: 1.5 mg every 12 hours  Qty: 180 capsule, Refills: 3    Associated Diagnoses: Lung replaced by transplant      tramadol (ULTRAM) 50 mg tablet Take 1 tablet (50 mg total) by mouth every 6 (six) hours as needed for Pain.  Qty: 90 tablet, Refills: 0    Associated Diagnoses: Diffuse large B-cell lymphoma of lymph nodes of multiple regions; Compression fracture of thoracic vertebra, closed, initial encounter      trazodone (DESYREL) 50 MG tablet Please provide 3 months supply  I pill PRN QHS Insomnia  Qty: 90 tablet, Refills: 1    Associated Diagnoses: Insomnia      alendronate (FOSAMAX) 70 MG tablet TAKE 1 TABLET EVERY 7 DAYS  Qty: 12 tablet, Refills: 3    Associated Diagnoses: Drug-induced osteoporosis; Post-menopausal      hydrocodone-acetaminophen 5-325mg (NORCO) 5-325 mg per tablet Take 1 tablet by mouth every 6 (six) hours as needed for Pain.  Qty: 30 tablet, Refills: 0    Associated Diagnoses: Cancer associated pain       !! - Potential duplicate  medications found. Please discuss with provider.        Patient Instructions:     Diet general     Activity as tolerated     Call MD for:  temperature >101     Call MD for:  coughing up blood greater than 3 tablespoons in volume     Call MD for:  chest pain     Call MD for:  difficulty breathing or shortness of breath     Call MD for:  development of yellow/green sputum       Follow Up:  Follow-up Information     Erick Nieves MD.    Specialty:  Transplant  Why:  As needed  Contact information:  [address]  [facility] [id number] [phone]                   Shavon Batres DO  Lung Transplant  [facility]

## 2018-01-23 NOTE — SUBJECTIVE & OBJECTIVE
Past Medical History:   Diagnosis Date    Acute rejection of lung transplant 12/12/2013    CARROLL (acute kidney injury)     Anemia 8/2/2016    Anxiety     Back pain 2016    Recently seen in ED for back pain    Cirrhosis     Depression     Hyperlipidemia     Hypertension     Lung transplant complication 1/10/2014    Lymphoma 8/10/2016    Obesity     DOROTHY (obstructive sleep apnea)     Osteoporosis     Postinflammatory pulmonary fibrosis        Past Surgical History:   Procedure Laterality Date    APPENDECTOMY      Removed at the time of hysterectomy    HYSTERECTOMY      LUNG TRANSPLANT Bilateral 04/25/2012       Review of patient's allergies indicates:  No Known Allergies    PTA Medications   Medication Sig    aspirin (ECOTRIN) 81 MG EC tablet Take 1 tablet (81 mg total) by mouth once daily.    calcium carbonate (OS-JERONIMO) 600 mg (1,500 mg) Tab Take 1 tablet (600 mg total) by mouth 2 (two) times daily with meals.    citalopram (CELEXA) 20 MG tablet TAKE 1 TABLET EVERY DAY    diphenhydrAMINE (BENADRYL) 25 mg capsule Take 50 mg by mouth nightly as needed.     magnesium oxide (MAG-OX) 400 mg tablet Take 1 tablet (400 mg total) by mouth 2 (two) times daily.    multivitamin (MULTIVITAMIN) per tablet Take 1 tablet by mouth once daily.      omeprazole (PRILOSEC) 40 MG capsule Take 1 capsule (40 mg total) by mouth once daily.    predniSONE (DELTASONE) 5 MG tablet Take 1 tablet (5 mg total) by mouth once daily.    sulfamethoxazole-trimethoprim 800-160mg (BACTRIM DS) 800-160 mg Tab Take 1 tablet by mouth every Mon, Wed, Fri.    tacrolimus (PROGRAF) 0.5 MG Cap Take 1 capsule (0.5 mg total) by mouth every 12 (twelve) hours.    tacrolimus (PROGRAF) 1 MG Cap Take 1 capsule (1 mg total) by mouth every 12 (twelve) hours. Daily doses: 1.5 mg every 12 hours    tramadol (ULTRAM) 50 mg tablet Take 1 tablet (50 mg total) by mouth every 6 (six) hours as needed for Pain.    trazodone (DESYREL) 50 MG tablet Please  provide 3 months supply  I pill PRN QHS Insomnia    alendronate (FOSAMAX) 70 MG tablet TAKE 1 TABLET EVERY 7 DAYS    hydrocodone-acetaminophen 5-325mg (NORCO) 5-325 mg per tablet Take 1 tablet by mouth every 6 (six) hours as needed for Pain.     Family History     Problem Relation (Age of Onset)    Cancer Father, Brother, Maternal Uncle, Paternal Uncle    Colon cancer Father    Heart attack Paternal Aunt    Heart disease Mother    Heart failure Mother    Hypertension Father, Sister, Sister, Sister    No Known Problems Daughter, Son, Maternal Grandmother, Maternal Grandfather, Paternal Grandmother, Paternal Grandfather, Paternal Aunt, Paternal Aunt    Other Father        Social History Main Topics    Smoking status: Never Smoker    Smokeless tobacco: Never Used    Alcohol use No    Drug use: No    Sexual activity: Not Currently     Review of Systems   Constitutional: Negative for activity change, appetite change, chills, diaphoresis, fatigue and fever.   HENT: Negative for congestion, ear discharge, ear pain, facial swelling, hearing loss, mouth sores, nosebleeds, postnasal drip, rhinorrhea, sinus pressure, sore throat, trouble swallowing and voice change.    Eyes: Negative for pain, discharge, redness and itching.   Respiratory: Negative for apnea, cough, choking, chest tightness, shortness of breath, wheezing and stridor.    Cardiovascular: Negative for chest pain, palpitations and leg swelling.   Gastrointestinal: Negative for abdominal distention, abdominal pain, anal bleeding, blood in stool, constipation, diarrhea, nausea, rectal pain and vomiting.   Endocrine: Negative for cold intolerance and heat intolerance.   Genitourinary: Negative for difficulty urinating, dysuria and flank pain.   Musculoskeletal: Negative for arthralgias, back pain, joint swelling, myalgias and neck pain.   Neurological: Negative for dizziness, tremors, light-headedness, numbness and headaches.   Psychiatric/Behavioral: Negative  for agitation and hallucinations. The patient is not nervous/anxious.      Objective:     Vital Signs (Most Recent):  Temp: 97.9 °F (36.6 °C) (01/23/18 1206)  Pulse: 74 (01/23/18 1206)  Resp: 20 (01/23/18 1206)  BP: 133/83 (01/23/18 1206)  SpO2: 100 % (01/23/18 1206) Vital Signs (24h Range):  Temp:  [97.9 °F (36.6 °C)-98 °F (36.7 °C)] 97.9 °F (36.6 °C)  Pulse:  [74-81] 74  Resp:  [18-20] 20  SpO2:  [99 %-100 %] 100 %  BP: (133-140)/(83-89) 133/83     Weight: 86.6 kg (191 lb)  Body mass index is 28.21 kg/m².    No intake or output data in the 24 hours ending 01/23/18 1313    Physical Exam   Constitutional: She is oriented to person, place, and time. She appears well-developed and well-nourished. No distress.   HENT:   Head: Normocephalic and atraumatic.   Nose: Nose normal.   Mouth/Throat: Oropharynx is clear and moist. No oropharyngeal exudate.   Eyes: Conjunctivae and EOM are normal. Pupils are equal, round, and reactive to light. Right eye exhibits no discharge. Left eye exhibits no discharge. No scleral icterus.   Neck: Normal range of motion. Neck supple. No JVD present. No tracheal deviation present. No thyromegaly present.   Cardiovascular: Normal rate, regular rhythm, normal heart sounds and intact distal pulses.  Exam reveals no gallop and no friction rub.    No murmur heard.  Pulmonary/Chest: Effort normal and breath sounds normal. No stridor. No respiratory distress. She has no wheezes. She has no rales. She exhibits no tenderness.   Abdominal: Soft. Bowel sounds are normal. She exhibits no distension and no mass. There is no tenderness. There is no rebound and no guarding.   Musculoskeletal: Normal range of motion. She exhibits no edema or tenderness.   Lymphadenopathy:     She has no cervical adenopathy.   Neurological: She is alert and oriented to person, place, and time.   Skin: Skin is warm and dry. No rash noted. She is not diaphoretic. No erythema. No pallor.       Significant Labs:  CBC:    Recent  Labs  Lab 01/23/18  0900   WBC 4.01   RBC 3.76*   HGB 12.3   HCT 37.9   *   *   MCH 32.7*   MCHC 32.5     BMP:    Recent Labs  Lab 01/23/18  0900      K 4.0      CO2 26   BUN 22   CREATININE 1.2   CALCIUM 10.0        I have reviewed all pertinent labs within the past 24 hours.    Diagnostic Results:  n/a

## 2018-01-23 NOTE — PROGRESS NOTES
Notified pulmonary patient pre-op complete in room 6. Per pulmonary, okay that patient has been taking aspirin and multivitamins as normal.

## 2018-01-23 NOTE — ASSESSMENT & PLAN NOTE
Will proceed with bronchoscopy  Anesthesia plan: Conscious sedation with Fentanyl and Versed.  ASA score: III  Airway assessment: MP II  No history of anesthesia complications.

## 2018-03-12 NOTE — PROGRESS NOTES
LUNG TRANSPLANT PULMONARY FOLLOW-UP    Reason for Visit: Follow-up after lung transplantation.                               Date of Transplant: 4/25/12     Reason for Transplant: Hypersensitivity Pneumonitis     Type of Transplant: bilateral sequential lung     CMV Status: D+ / R+      Major Complications:   1. 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 PTLD (vertebral) diagnosed on August 2016                                                                                        History of Present Illness:  She underwent bronch for worsening dyspnea on 1/23/18 which was culture negative/A0/B0. Since that time, she has been feeling better, stronger. She is starting to re-engage with life a bit more, thinking of going back to [employer] (which she had been avoiding over concern for infection risk). She is able to do housework with frequent breaks, is able to go shopping at [employer] with minimal dyspnea. Previously noted sinus congestin has resolved. No sick contacts, fevers/chills or cough.    She completed R-CHOP chemotherapy for DLBCL (last treatment 11/16).  Per her and heme/onc she is in remission.        Current Outpatient Prescriptions   Medication Sig    alendronate (FOSAMAX) 70 MG tablet TAKE 1 TABLET EVERY 7 DAYS    aspirin (ECOTRIN) 81 MG EC tablet Take 1 tablet (81 mg total) by mouth once daily.    calcium carbonate (OS-JERONIMO) 600 mg (1,500 mg) Tab Take 1 tablet (600 mg total) by mouth 2 (two) times daily with meals.    citalopram (CELEXA) 20 MG tablet TAKE 1 TABLET EVERY DAY    diphenhydrAMINE (BENADRYL) 25 mg capsule Take 50 mg by mouth nightly as needed.     hydrocodone-acetaminophen 5-325mg (NORCO) 5-325 mg per tablet Take 1 tablet by mouth every 6 (six) hours as needed for Pain.    magnesium oxide (MAG-OX) 400 mg tablet Take 1 tablet (400 mg total) by mouth 2 (two) times daily.    multivitamin (MULTIVITAMIN) per tablet Take 1 tablet by mouth once daily.      omeprazole (PRILOSEC) 40 MG capsule Take 1 capsule (40 mg  total) by mouth once daily.    predniSONE (DELTASONE) 5 MG tablet Take 1 tablet (5 mg total) by mouth once daily.    sulfamethoxazole-trimethoprim 800-160mg (BACTRIM DS) 800-160 mg Tab Take 1 tablet by mouth every Mon, Wed, Fri.    tacrolimus (PROGRAF) 0.5 MG Cap Take 1 capsule (0.5 mg total) by mouth every 12 (twelve) hours.    tacrolimus (PROGRAF) 1 MG Cap Take 1 capsule (1 mg total) by mouth every 12 (twelve) hours. Daily doses: 1.5 mg every 12 hours    traMADol (ULTRAM) 50 mg tablet TAKE 1 TABLET EVERY 6 HOURS AS NEEDED FOR PAIN    trazodone (DESYREL) 50 MG tablet Please provide 3 months supply  I pill PRN QHS Insomnia     No current facility-administered medications for this visit.      Review of Systems   Constitutional: Negative for chills, diaphoresis, fever, malaise/fatigue and weight loss.   HENT: Negative for congestion, ear discharge, ear pain, hearing loss, nosebleeds and sore throat.    Eyes: Negative for blurred vision, double vision and photophobia.   Respiratory: Negative for cough, hemoptysis, sputum production, shortness of breath and wheezing.    Cardiovascular: Negative for chest pain, palpitations, orthopnea, claudication, leg swelling and PND.   Gastrointestinal: Negative for abdominal pain, blood in stool, constipation, diarrhea, heartburn, melena, nausea and vomiting.   Genitourinary: Negative for dysuria, flank pain, frequency, hematuria and urgency.   Musculoskeletal: Negative for back pain, falls, joint pain, myalgias and neck pain.   Skin: Negative for itching and rash.   Neurological: Negative for dizziness, tremors, sensory change, loss of consciousness, weakness and headaches.   Endo/Heme/Allergies: Does not bruise/bleed easily.   Psychiatric/Behavioral: Negative for depression, hallucinations and memory loss. The patient is not nervous/anxious and does not have insomnia.      Vitals  /76 (BP Location: Left arm, Patient Position: Sitting, BP Method: Medium (Automatic))    "Pulse 106   Temp 99.5 °F (37.5 °C) (Oral)   Resp 20   Ht 5' 7" (1.702 m)   Wt 85.7 kg (189 lb)   LMP  (LMP Unknown)   SpO2 100%   BMI 29.60 kg/m²     Physical Exam   Constitutional: She is oriented to person, place, and time and well-developed, well-nourished, and in no distress. No distress.   HENT:   Head: Normocephalic and atraumatic.   Nose: Nose normal.   Mouth/Throat: Oropharynx is clear and moist. No oropharyngeal exudate.   Eyes: Conjunctivae and EOM are normal. Pupils are equal, round, and reactive to light. No scleral icterus.   Neck: Normal range of motion. Neck supple. No JVD present. No tracheal deviation present. No thyromegaly present.   Cardiovascular: Normal rate, regular rhythm and intact distal pulses.  Exam reveals no gallop and no friction rub.    No murmur heard.  Pulmonary/Chest: Effort normal and breath sounds normal. No stridor. No respiratory distress. She has no wheezes. She has no rales. She exhibits no tenderness.   Abdominal: Soft. Bowel sounds are normal. She exhibits no distension and no mass. There is no tenderness. There is no rebound and no guarding.   Musculoskeletal: Normal range of motion. She exhibits no edema.   Lymphadenopathy:     She has no cervical adenopathy.   Neurological: She is alert and oriented to person, place, and time. No cranial nerve deficit. Gait normal. Coordination normal.   Skin: Skin is warm and dry. No rash noted. She is not diaphoretic. No erythema. No pallor.   Psychiatric: Mood and affect normal.     Labs:  Results for orders placed or performed in visit on 03/12/18   Magnesium   Result Value Ref Range    Magnesium 1.8 1.6 - 2.6 mg/dL   CBC W/ AUTO DIFFERENTIAL   Result Value Ref Range    WBC 3.67 (L) 3.90 - 12.70 K/uL    RBC 2.92 (L) 4.00 - 5.40 M/uL    Hemoglobin 9.2 (L) 12.0 - 16.0 g/dL    Hematocrit 29.8 (L) 37.0 - 48.5 %     (H) 82 - 98 fL    MCH 31.5 (H) 27.0 - 31.0 pg    MCHC 30.9 (L) 32.0 - 36.0 g/dL    RDW 14.7 (H) 11.5 - 14.5 % "    Platelets 116 (L) 150 - 350 K/uL    MPV 10.3 9.2 - 12.9 fL    Immature Granulocytes 0.5 0.0 - 0.5 %    Gran # (ANC) 2.1 1.8 - 7.7 K/uL    Immature Grans (Abs) 0.02 0.00 - 0.04 K/uL    Lymph # 0.7 (L) 1.0 - 4.8 K/uL    Mono # 0.7 0.3 - 1.0 K/uL    Eos # 0.1 0.0 - 0.5 K/uL    Baso # 0.02 0.00 - 0.20 K/uL    nRBC 0 0 /100 WBC    Gran% 57.8 38.0 - 73.0 %    Lymph% 19.1 18.0 - 48.0 %    Mono% 19.9 (H) 4.0 - 15.0 %    Eosinophil% 2.2 0.0 - 8.0 %    Basophil% 0.5 0.0 - 1.9 %    Differential Method Automated    Comprehensive metabolic panel   Result Value Ref Range    Sodium 144 136 - 145 mmol/L    Potassium 4.1 3.5 - 5.1 mmol/L    Chloride 108 95 - 110 mmol/L    CO2 27 23 - 29 mmol/L    Glucose 88 70 - 110 mg/dL    BUN, Bld 27 (H) 8 - 23 mg/dL    Creatinine 1.3 0.5 - 1.4 mg/dL    Calcium 9.6 8.7 - 10.5 mg/dL    Total Protein 6.3 6.0 - 8.4 g/dL    Albumin 3.6 3.5 - 5.2 g/dL    Total Bilirubin 0.9 0.1 - 1.0 mg/dL    Alkaline Phosphatase 60 55 - 135 U/L    AST 23 10 - 40 U/L    ALT 11 10 - 44 U/L    Anion Gap 9 8 - 16 mmol/L    eGFR if African American 51.2 (A) >60 mL/min/1.73 m^2    eGFR if non African American 44.4 (A) >60 mL/min/1.73 m^2       Pulmonary Function Tests 3/12/2018 12/21/2017 12/11/2017 9/11/2017 6/15/2017 3/9/2017 1/23/2017   FVC 2.37 2.26 2.24 2.46 2.39 2.43 2.47   FEV1 2.2 1.97 1.89 2.1 2.05 2.02 2.07   FEV1/FVC - - - - - - -   TLC (liters) - - - - - - -   DLCO (ml/mmHg sec) - - - - - - -   FVC% 72 67 67 74 72 73 75   FEV1% 77 74 72 79 78 76 78   FEF 25-75 2.63 2.93 2.32 2.7 2.53 2.27 2.37   FEF 25-75% 102 112 89 103 97 87 91   TLC% - - - - - - -   RV - - - - - - -   RV% - - - - - - -   DLCO% - - - - - - -     Imaging:  Results for orders placed during the hospital encounter of 03/12/18   X-Ray Chest PA And Lateral    Narrative Time of Procedure: 03/12/18 08:49:29  Accession # [id number]    Comparison:   Post transplant chest radiographs: 1/23/2018.  12/11/2017.  12/8/2016.  10/24/2016.  3/17/2016.   4/28/2012.  4/25/2012.  Preoperative chest radiograph: 12/5/2011.    Number of views: 2.    Findings:  Port in the RIGHT anterior chest wall is attached to a catheter which extends to the superior vena cava.  Sternal sutures are intact and aligned in this patient with a history of lung transplant.  Hemostasis clips are present in the mediastinum.    Mediastinal structures are midline.  There is chronic elevation of LEFT hemidiaphragm.    I detect no pulmonary disease, pleural fluid, lymph node enlargement, cardiac decompensation, pneumothorax, pneumomediastinum or pneumoperitoneum.    There is severe wedge compression deformity of a midthoracic vertebral body similar to studies dating back to 10/24/2016.  There is slight anterior wedging of several other mid thoracic vertebral body similar to 10/2016.    Impression No acute disease identified in this patient status post bilateral sequential lung transplant.  Chronic abnormalities include mild elevation of the LEFT hemidiaphragm and anterior wedging of several midthoracic vertebral bodies, severe at T8 and mild at other levels.       Electronically signed by: Christianne Briggs MD  Date:     03/12/18  Time:    09:03        Assessment:  1. Encounter following organ transplant    2. Lung replaced by transplant    3. Immunosuppression    4. Prophylactic antibiotic    5. Chronic kidney disease (CKD), stage III (moderate)      Plan:      1.FEV1 and CXR are both stable. Her previous symptoms which prompted bronchoscopy resolved without intervention. Pathology showed no evidence of rejection.    2. Continue with tacrolimus and prednisone.       3. Continue with Bactrim     4. Continue to monitor her renal function which is moderately decreased but stable.     5. RTC in 6 months, earlier if necessary      Zoya Redd MD  [facility] Fellow    Attending Note:    I have seen and evaluated the patient with the fellow. Their note reflects the content of our discussion and my plan  of care.      Erick Nieves MD  Pulmonary/Critical Care Medicine

## 2018-03-16 NOTE — TELEPHONE ENCOUNTER
Left patient a voicemail to disregard the [facility] message.  Patient never started this medication due to cost.

## 2018-03-26 PROBLEM — Z79.2 PROPHYLACTIC ANTIBIOTIC: Status: ACTIVE | Noted: 2018-01-01

## 2018-03-26 PROBLEM — M25.512 SHOULDER PAIN, LEFT: Status: ACTIVE | Noted: 2018-01-01

## 2018-03-26 NOTE — ASSESSMENT & PLAN NOTE
H/H stable, 8.1/25.9 today, most recently 9.2/29.8 on 3/12.   No signs of acute bleed, FOBT neg in ED.  VSS, patient in sinus tach, improved with IVF administration.     History of lymphoma with vertebral lytic lesion s/p chemo.   Heme/onc consulted, appreciate recs.   Will order B12/folate to determine etiology of macrocytic anemia.

## 2018-03-26 NOTE — HPI
60-year-old status post lung transplant (in 2012 2/2 hypersensitivity pneumonitis, complicated by A2 rejection in 2013, on chronic pred/tacro), PTLPD (DLBCL) diagnosed 8/12/16 s/p 6 cycles of RCHOP now with no evidence of disease on scans, who presents to the hospital with complaint of left shoulder pain.  Hematology consulted for macrocytic anemia.  The patient states she has been having pain in her left shoulder for 2 days and states she feels like she may have slept on it causing the pain.  The patient denies weakness, numbness or tingling.  The patient denies fever, chills, night sweats, weight loss, new lumps or bumps, easy bruising or bleeding.  The patient states she does not remember being told she was anemic in the past.

## 2018-03-26 NOTE — HPI
"Lena Lynn is a 62 y.o. female s/p bilateral lung transplant (4/25/2012) 2/2 hypersensitivity pneumonitis, A2 rejection in 12/2013  [facility] history of PTLD w/ vertebral lytic lesion s/p chemo, and osteoporosis who presented to the ED with left shoulder/arm pain x 2 days. Patient states arm pain is progressive, described as a "heaviness" that is constant, exacerbated with use, palpation and laying on that arm. Patient denies prior episodes. She also complained of lightheadedness which resolved with administration of IVF in the ED.    Patient has had ongoing LIRIANO unchanged from last OV, last seen in outpatient lung transplant clinic 3/12 for routine f/u. She underwent bronch for worsening dyspnea on 1/23/18 which was culture negative/A0/B0. FEV1 has remained stable outpatient. ED workup revealed macrocytic anemia (H/H 8.1/25.9 today, most recently 9.2/29.8 on 3/12), troponin, TSH wnl, UA with 2+leuks. Patient denies palpitations, chest pain, SOB, fevers, chills, dysuria, abdominal pain, n/v/d. No recent travel or sick contacts.       "

## 2018-03-26 NOTE — SUBJECTIVE & OBJECTIVE
Oncology Treatment Plan:   OP R-CHOP    Medications:  Continuous Infusions:  Scheduled Meds:   aspirin  81 mg Oral Daily    calcium carbonate  500 mg Oral BID WM    citalopram  20 mg Oral Daily    magnesium oxide  400 mg Oral BID    pantoprazole  40 mg Oral Daily    predniSONE  5 mg Oral Daily    sulfamethoxazole-trimethoprim 800-160mg  1 tablet Oral Every Mon, Wed, Fri    tacrolimus  0.5 mg Oral BID    tacrolimus  1 mg Oral BID     PRN Meds:acetaminophen, diphenhydrAMINE, potassium chloride 10% **AND** potassium chloride 10% **AND** potassium chloride 10%, traMADol, traZODone     Review of patient's allergies indicates:  No Known Allergies     Past Medical History:   Diagnosis Date    Acute rejection of lung transplant 12/12/2013    CARROLL (acute kidney injury)     Anemia 8/2/2016    Anxiety     Back pain 2016    Recently seen in ED for back pain    Cirrhosis     Depression     Hyperlipidemia     Hypertension     Lung transplant complication 1/10/2014    Lymphoma 8/10/2016    Obesity     DOROTHY (obstructive sleep apnea)     Osteoporosis     Postinflammatory pulmonary fibrosis      Past Surgical History:   Procedure Laterality Date    APPENDECTOMY      Removed at the time of hysterectomy    HYSTERECTOMY      LUNG TRANSPLANT Bilateral 04/25/2012     Family History     Problem Relation (Age of Onset)    Cancer Father, Brother, Maternal Uncle, Paternal Uncle    Colon cancer Father    Heart attack Paternal Aunt    Heart disease Mother    Heart failure Mother    Hypertension Father, Sister, Sister, Sister    No Known Problems Daughter, Son, Maternal Grandmother, Maternal Grandfather, Paternal Grandmother, Paternal Grandfather, Paternal Aunt, Paternal Aunt    Other Father        Social History Main Topics    Smoking status: Never Smoker    Smokeless tobacco: Never Used    Alcohol use No    Drug use: No    Sexual activity: Not Currently       Review of Systems   Constitutional: Negative for  chills, fatigue and fever.   HENT: Negative for sore throat and trouble swallowing.    Eyes: Negative for photophobia, pain and visual disturbance.   Respiratory: Negative for chest tightness and shortness of breath.    Cardiovascular: Negative for chest pain, palpitations and leg swelling.   Gastrointestinal: Negative for abdominal pain, constipation, diarrhea, nausea and vomiting.   Genitourinary: Negative for difficulty urinating and dysuria.   Musculoskeletal: Negative for arthralgias and back pain.        Pain in left shoulder   Skin: Negative for color change and rash.   Neurological: Negative for weakness, numbness and headaches.   Hematological: Negative for adenopathy. Does not bruise/bleed easily.     Objective:     Vital Signs (Most Recent):  Temp: 98.4 °F (36.9 °C) (03/26/18 1657)  Pulse: 96 (03/26/18 1657)  Resp: 16 (03/26/18 1657)  BP: (!) 100/59 (03/26/18 1657)  SpO2: 98 % (03/26/18 1657) Vital Signs (24h Range):  Temp:  [98.2 °F (36.8 °C)-98.8 °F (37.1 °C)] 98.4 °F (36.9 °C)  Pulse:  [] 96  Resp:  [16-18] 16  SpO2:  [96 %-100 %] 98 %  BP: (100-108)/(59-73) 100/59     Weight: 86.2 kg (190 lb)  Body mass index is 28.89 kg/m².  Body surface area is 2.03 meters squared.      Intake/Output Summary (Last 24 hours) at 03/26/18 1742  Last data filed at 03/26/18 0913   Gross per 24 hour   Intake              500 ml   Output                0 ml   Net              500 ml       Physical Exam   Constitutional: She is oriented to person, place, and time. She appears well-developed and well-nourished. No distress.   HENT:   Head: Normocephalic and atraumatic.   Mouth/Throat: No oropharyngeal exudate.   Eyes: EOM are normal. Right eye exhibits no discharge. Left eye exhibits no discharge. No scleral icterus.   Cardiovascular: Normal rate, regular rhythm, normal heart sounds and intact distal pulses.  Exam reveals no gallop and no friction rub.    No murmur heard.  Pulmonary/Chest: Effort normal and breath  sounds normal. No respiratory distress. She has no wheezes. She has no rales. She exhibits no tenderness.   Abdominal: Soft. Bowel sounds are normal. She exhibits no distension and no mass. There is no tenderness. There is no rebound and no guarding.   Musculoskeletal: Normal range of motion. She exhibits no edema or tenderness.   Neurological: She is alert and oriented to person, place, and time.   Skin: No rash noted. She is not diaphoretic. No erythema.   Psychiatric: She has a normal mood and affect. Her behavior is normal.       Significant Labs:   Recent Results (from the past 24 hour(s))   Lactic acid, plasma    Collection Time: 03/26/18  8:26 AM   Result Value Ref Range    Lactate (Lactic Acid) 1.4 0.5 - 2.2 mmol/L   Troponin I    Collection Time: 03/26/18  9:13 AM   Result Value Ref Range    Troponin I 0.012 0.000 - 0.026 ng/mL   TSH    Collection Time: 03/26/18  9:13 AM   Result Value Ref Range    TSH 0.644 0.400 - 4.000 uIU/mL   Comprehensive metabolic panel    Collection Time: 03/26/18  9:13 AM   Result Value Ref Range    Sodium 137 136 - 145 mmol/L    Potassium 4.3 3.5 - 5.1 mmol/L    Chloride 106 95 - 110 mmol/L    CO2 20 (L) 23 - 29 mmol/L    Glucose 91 70 - 110 mg/dL    BUN, Bld 20 8 - 23 mg/dL    Creatinine 1.2 0.5 - 1.4 mg/dL    Calcium 9.2 8.7 - 10.5 mg/dL    Total Protein 5.8 (L) 6.0 - 8.4 g/dL    Albumin 3.1 (L) 3.5 - 5.2 g/dL    Total Bilirubin 1.6 (H) 0.1 - 1.0 mg/dL    Alkaline Phosphatase 105 55 - 135 U/L    AST 46 (H) 10 - 40 U/L    ALT 23 10 - 44 U/L    Anion Gap 11 8 - 16 mmol/L    eGFR if African American 56.4 (A) >60 mL/min/1.73 m^2    eGFR if non  48.9 (A) >60 mL/min/1.73 m^2   CBC auto differential    Collection Time: 03/26/18  9:13 AM   Result Value Ref Range    WBC 5.50 3.90 - 12.70 K/uL    RBC 2.60 (L) 4.00 - 5.40 M/uL    Hemoglobin 8.1 (L) 12.0 - 16.0 g/dL    Hematocrit 25.9 (L) 37.0 - 48.5 %     (H) 82 - 98 fL    MCH 31.2 (H) 27.0 - 31.0 pg    MCHC 31.3 (L)  32.0 - 36.0 g/dL    RDW 14.6 (H) 11.5 - 14.5 %    Platelets 112 (L) 150 - 350 K/uL    MPV 10.1 9.2 - 12.9 fL    Immature Granulocytes 0.9 (H) 0.0 - 0.5 %    Gran # (ANC) 3.8 1.8 - 7.7 K/uL    Immature Grans (Abs) 0.05 (H) 0.00 - 0.04 K/uL    Lymph # 0.9 (L) 1.0 - 4.8 K/uL    Mono # 0.7 0.3 - 1.0 K/uL    Eos # 0.0 0.0 - 0.5 K/uL    Baso # 0.01 0.00 - 0.20 K/uL    nRBC 0 0 /100 WBC    Gran% 68.8 38.0 - 73.0 %    Lymph% 16.7 (L) 18.0 - 48.0 %    Mono% 12.9 4.0 - 15.0 %    Eosinophil% 0.5 0.0 - 8.0 %    Basophil% 0.2 0.0 - 1.9 %    Differential Method Automated    Urinalysis, Reflex to Urine Culture Urine, Clean Catch    Collection Time: 03/26/18  9:28 AM   Result Value Ref Range    Specimen UA Urine, Clean Catch     Color, UA Sherri Yellow, Straw, Sherri    Appearance, UA Cloudy (A) Clear    pH, UA 5.0 5.0 - 8.0    Specific Gravity, UA 1.025 1.005 - 1.030    Protein, UA 1+ (A) Negative    Glucose, UA Negative Negative    Ketones, UA Negative Negative    Bilirubin (UA) Negative Negative    Occult Blood UA Negative Negative    Nitrite, UA Negative Negative    Urobilinogen, UA Negative <2.0 EU/dL    Leukocytes, UA 2+ (A) Negative   Urinalysis Microscopic    Collection Time: 03/26/18  9:28 AM   Result Value Ref Range    RBC, UA 3 0 - 4 /hpf    WBC, UA 20 (H) 0 - 5 /hpf    Bacteria, UA Many (A) None-Occ /hpf    Squam Epithel, UA 26 /hpf    Hyaline Casts, UA 0 0-1/lpf /lpf    Microscopic Comment SEE COMMENT          Diagnostic Results:  CXR 3/26/18  Vague nodular opacity at the left base superimposed on the left hemidiaphragm.  This was not seen on the recent study of March 12, 2018.  Follow-up studies would seem reasonable as this may merely represent superimposition of vascular structures.    Xray left shoulder  Degenerative change.    Xray left humerus  No significant abnormality. Bones are osteopenic.  Correlation with any point tenderness would be helpful.

## 2018-03-26 NOTE — ASSESSMENT & PLAN NOTE
-The patient has a macrocytic anemia on laboratory work up  -Would rule out hemolysis and check haptoglobin, LDH.  If haptoglobin low and LDH elevated, would check REMIGIO.  -Would check reticulocyte count  -Would assess for nutritional causes of macrocytic anemia by checking folate and B12.  -Will monitor results of aforementioned lab work.  -Pt to follow up with primary hematologist on d/c.

## 2018-03-26 NOTE — H&P
"[facility]  Lung Transplant  H&P    Patient Name: Lena Lynn  MRN: [id number]  Admission Date: 3/26/2018  Attending Physician: Gaetano Hernandez MD  Primary Care Provider: Erick Nieves MD     Subjective:     History of Present Illness:  Lena Lynn is a 62 y.o. female  s/p bilateral lung transplant (4/25/2012) 2/2 hypersensitivity pneumonitis, A2 rejection in 12/2013  wth history of PTLD w/ vertebral lytic lesion s/p chemo, and osteoporosis who presented to the ED with left shoulder/arm pain x 2 days. Patient states arm pain is progressive, described as a "heaviness" that is constant, exacerbated with use, palpation and laying on that arm. Patient denies prior episodes. She also complained of lightheadedness which resolved with administration of IVF in the ED.    Patient has had ongoing LIRIANO unchanged from last OV, last seen in outpatient lung transplant clinic 3/12 for routine f/u. She underwent bronch for worsening dyspnea on 1/23/18 which was culture negative/A0/B0. FEV1 has remained stable outpatient. ED workup revealed macrocytic anemia (H/H 8.1/25.9 today, most recently 9.2/29.8 on 3/12), troponin/TSH wnl, UA with 2+leuks. Patient denies palpitations, chest pain, SOB, fevers, chills, dysuria, abdominal pain, n/v/d. No recent travel or sick contacts.         Past Medical History:   Diagnosis Date    Acute rejection of lung transplant 12/12/2013    CARROLL (acute kidney injury)     Anemia 8/2/2016    Anxiety     Back pain 2016    Recently seen in ED for back pain    Cirrhosis     Depression     Hyperlipidemia     Hypertension     Lung transplant complication 1/10/2014    Lymphoma 8/10/2016    Obesity     DOROTHY (obstructive sleep apnea)     Osteoporosis     Postinflammatory pulmonary fibrosis        Past Surgical History:   Procedure Laterality Date    APPENDECTOMY      Removed at the time of hysterectomy    HYSTERECTOMY      LUNG TRANSPLANT Bilateral 04/25/2012 "       Review of patient's allergies indicates:  No Known Allergies      (Not in a hospital admission)  Family History     Problem Relation (Age of Onset)    Cancer Father, Brother, Maternal Uncle, Paternal Uncle    Colon cancer Father    Heart attack Paternal Aunt    Heart disease Mother    Heart failure Mother    Hypertension Father, Sister, Sister, Sister    No Known Problems Daughter, Son, Maternal Grandmother, Maternal Grandfather, Paternal Grandmother, Paternal Grandfather, Paternal Aunt, Paternal Aunt    Other Father        Social History Main Topics    Smoking status: Never Smoker    Smokeless tobacco: Never Used    Alcohol use No    Drug use: No    Sexual activity: Not Currently     Review of Systems   Constitutional: Positive for fatigue (unchanged). Negative for activity change, appetite change, chills, diaphoresis and fever.   HENT: Negative for congestion, ear discharge, ear pain, facial swelling, hearing loss, mouth sores, nosebleeds, postnasal drip, rhinorrhea, sinus pressure, sore throat, trouble swallowing and voice change.    Eyes: Negative for pain, discharge, redness and itching.   Respiratory: Negative for apnea, cough, choking, chest tightness, shortness of breath, wheezing and stridor.         LIRIANO   Cardiovascular: Negative for chest pain, palpitations and leg swelling.   Gastrointestinal: Negative for abdominal distention, abdominal pain, anal bleeding, blood in stool, constipation, diarrhea, nausea, rectal pain and vomiting.   Endocrine: Negative for cold intolerance and heat intolerance.   Genitourinary: Negative for difficulty urinating, dysuria and flank pain.   Musculoskeletal: Negative for arthralgias, back pain, joint swelling, myalgias and neck pain.        Left shoulder pain   Skin: Negative.    Allergic/Immunologic: Positive for immunocompromised state.   Neurological: Negative for dizziness, tremors, weakness, light-headedness, numbness and headaches.   Psychiatric/Behavioral:  Negative for agitation and hallucinations. The patient is not nervous/anxious.      Objective:     Vital Signs (Most Recent):  Temp: 98.8 °F (37.1 °C) (03/26/18 1125)  Pulse: (!) 112 (03/26/18 1125)  Resp: 16 (03/26/18 1125)  BP: 107/67 (03/26/18 1125)  SpO2: 98 % (03/26/18 1125) Vital Signs (24h Range):  Temp:  [98.2 °F (36.8 °C)-98.8 °F (37.1 °C)] 98.8 °F (37.1 °C)  Pulse:  [102-119] 112  Resp:  [16-18] 16  SpO2:  [96 %-100 %] 98 %  BP: (102-108)/(67-73) 107/67     Weight: 86.2 kg (190 lb)  Body mass index is 28.89 kg/m².      Intake/Output Summary (Last 24 hours) at 03/26/18 1242  Last data filed at 03/26/18 0913   Gross per 24 hour   Intake              500 ml   Output                0 ml   Net              500 ml       Physical Exam   Constitutional: She is oriented to person, place, and time. She appears well-developed and well-nourished. No distress.   HENT:   Head: Normocephalic and atraumatic.   Nose: Nose normal.   Mouth/Throat: Oropharynx is clear and moist. No oropharyngeal exudate.   Eyes: Conjunctivae and EOM are normal. Pupils are equal, round, and reactive to light. Right eye exhibits no discharge. Left eye exhibits no discharge. No scleral icterus.   Neck: Normal range of motion. Neck supple. No JVD present. No tracheal deviation present. No thyromegaly present.   Cardiovascular: Regular rhythm, normal heart sounds and intact distal pulses.  Exam reveals no gallop and no friction rub.    No murmur heard.  Tachycardic   Pulmonary/Chest: Effort normal and breath sounds normal. No stridor. No respiratory distress. She has no wheezes. She has no rales. She exhibits no tenderness.   Abdominal: Soft. Bowel sounds are normal. She exhibits no distension and no mass. There is no tenderness. There is no rebound and no guarding.   Musculoskeletal: Normal range of motion. She exhibits tenderness. She exhibits no edema.   Lymphadenopathy:     She has no cervical adenopathy.   Neurological: She is alert and  oriented to person, place, and time.   Skin: Skin is warm and dry. No rash noted. She is not diaphoretic. No erythema. No pallor.   Psychiatric: She has a normal mood and affect. Her behavior is normal. Judgment and thought content normal.   Nursing note and vitals reviewed.      Significant Labs:  CBC:    Recent Labs  Lab 03/26/18  0913   WBC 5.50   RBC 2.60*   HGB 8.1*   HCT 25.9*   *   *   MCH 31.2*   MCHC 31.3*     BMP:    Recent Labs  Lab 03/26/18  0913      K 4.3      CO2 20*   BUN 20   CREATININE 1.2   CALCIUM 9.2        I have reviewed all pertinent labs within the past 24 hours.    Diagnostic Results:  X-Ray: Reviewed    Assessment/Plan:     * Anemia    H/H stable, 8.1/25.9 today, most recently 9.2/29.8 on 3/12.   No signs of acute bleed, FOBT neg in ED.  VSS, patient in sinus tach, improved with IVF administration.   Will place in observation.     History of lymphoma with vertebral lytic lesion s/p chemo.   Heme/onc consulted, appreciate recs.   Will order B12/folate to determine etiology of macrocytic anemia.         Lung replaced by transplant    History of A2 rejection in 2013, most recent bronchoscopy on 1/23 without evidence of rejection, BAL cx wnl.     Continue current outpatient immunosuppression and ppx.         Immunosuppression    Continue outpatient regimen - tacrolimus 1.5 mg BID, prednisone 5 mg daily.     Daily tacrolimus levels, will adjust dose as needed.         Prophylactic antibiotic    Continue outpatient regimen with bactrim DS TIW.         Stage 3 chronic kidney disease    BUN/Cr 20/1.2.   Will continue to monitor on daily labs.         Shoulder pain, left    CXR Pa/lat unchanged from prior imaging - Persistent elevation of the left hemidiaphragm, kyphosis and compression fracture in the midthoracic spine without change, evidence of osteopenia.  XR left humerus/shoulder - most c/w degenerative changes, no new lesions.     Troponins neg, imaging unremarkable,  hemodynamically stable.   History of lymphoma with vertebral lytic lesion s/p chemo - likely contributing to shoulder pain.             Ekaterina De La Cruz PA-C  Lung Transplant  [facility]

## 2018-03-26 NOTE — ASSESSMENT & PLAN NOTE
History of A2 rejection in 2013, most recent bronchoscopy on 1/23 without evidence of rejection, BAL cx wnl.     Continue current outpatient immunosuppression and ppx.

## 2018-03-26 NOTE — ASSESSMENT & PLAN NOTE
Continue outpatient regimen - tacrolimus 1.5 mg BID, prednisone 5 mg daily.     Daily tacrolimus levels, will adjust dose as needed.

## 2018-03-26 NOTE — ASSESSMENT & PLAN NOTE
CXR Pa/lat unchanged from prior imaging - Persistent elevation of the left hemidiaphragm, kyphosis and compression fracture in the midthoracic spine without change, evidence of osteopenia.  XR left humerus/shoulder - most c/w degenerative changes, no new lesions.     Troponins neg, imaging unremarkable, hemodynamically stable.   History of lymphoma with veretral lytic lesion s/p chemo - likely contributing to shoulder pain.

## 2018-03-26 NOTE — SUBJECTIVE & OBJECTIVE
Past Medical History:   Diagnosis Date    Acute rejection of lung transplant 12/12/2013    CARROLL (acute kidney injury)     Anemia 8/2/2016    Anxiety     Back pain 2016    Recently seen in ED for back pain    Cirrhosis     Depression     Hyperlipidemia     Hypertension     Lung transplant complication 1/10/2014    Lymphoma 8/10/2016    Obesity     DOROTHY (obstructive sleep apnea)     Osteoporosis     Postinflammatory pulmonary fibrosis        Past Surgical History:   Procedure Laterality Date    APPENDECTOMY      Removed at the time of hysterectomy    HYSTERECTOMY      LUNG TRANSPLANT Bilateral 04/25/2012       Review of patient's allergies indicates:  No Known Allergies      (Not in a hospital admission)  Family History     Problem Relation (Age of Onset)    Cancer Father, Brother, Maternal Uncle, Paternal Uncle    Colon cancer Father    Heart attack Paternal Aunt    Heart disease Mother    Heart failure Mother    Hypertension Father, Sister, Sister, Sister    No Known Problems Daughter, Son, Maternal Grandmother, Maternal Grandfather, Paternal Grandmother, Paternal Grandfather, Paternal Aunt, Paternal Aunt    Other Father        Social History Main Topics    Smoking status: Never Smoker    Smokeless tobacco: Never Used    Alcohol use No    Drug use: No    Sexual activity: Not Currently     Review of Systems   Constitutional: Positive for fatigue (unchanged). Negative for activity change, appetite change, chills, diaphoresis and fever.   HENT: Negative for congestion, ear discharge, ear pain, facial swelling, hearing loss, mouth sores, nosebleeds, postnasal drip, rhinorrhea, sinus pressure, sore throat, trouble swallowing and voice change.    Eyes: Negative for pain, discharge, redness and itching.   Respiratory: Negative for apnea, cough, choking, chest tightness, shortness of breath, wheezing and stridor.         LIRIANO   Cardiovascular: Negative for chest pain, palpitations and leg swelling.    Gastrointestinal: Negative for abdominal distention, abdominal pain, anal bleeding, blood in stool, constipation, diarrhea, nausea, rectal pain and vomiting.   Endocrine: Negative for cold intolerance and heat intolerance.   Genitourinary: Negative for difficulty urinating, dysuria and flank pain.   Musculoskeletal: Negative for arthralgias, back pain, joint swelling, myalgias and neck pain.        Left shoulder pain   Skin: Negative.    Allergic/Immunologic: Positive for immunocompromised state.   Neurological: Negative for dizziness, tremors, weakness, light-headedness, numbness and headaches.   Psychiatric/Behavioral: Negative for agitation and hallucinations. The patient is not nervous/anxious.      Objective:     Vital Signs (Most Recent):  Temp: 98.8 °F (37.1 °C) (03/26/18 1125)  Pulse: (!) 112 (03/26/18 1125)  Resp: 16 (03/26/18 1125)  BP: 107/67 (03/26/18 1125)  SpO2: 98 % (03/26/18 1125) Vital Signs (24h Range):  Temp:  [98.2 °F (36.8 °C)-98.8 °F (37.1 °C)] 98.8 °F (37.1 °C)  Pulse:  [102-119] 112  Resp:  [16-18] 16  SpO2:  [96 %-100 %] 98 %  BP: (102-108)/(67-73) 107/67     Weight: 86.2 kg (190 lb)  Body mass index is 28.89 kg/m².      Intake/Output Summary (Last 24 hours) at 03/26/18 1242  Last data filed at 03/26/18 0913   Gross per 24 hour   Intake              500 ml   Output                0 ml   Net              500 ml       Physical Exam   Constitutional: She is oriented to person, place, and time. She appears well-developed and well-nourished. No distress.   HENT:   Head: Normocephalic and atraumatic.   Nose: Nose normal.   Mouth/Throat: Oropharynx is clear and moist. No oropharyngeal exudate.   Eyes: Conjunctivae and EOM are normal. Pupils are equal, round, and reactive to light. Right eye exhibits no discharge. Left eye exhibits no discharge. No scleral icterus.   Neck: Normal range of motion. Neck supple. No JVD present. No tracheal deviation present. No thyromegaly present.   Cardiovascular:  Regular rhythm, normal heart sounds and intact distal pulses.  Exam reveals no gallop and no friction rub.    No murmur heard.  Tachycardic   Pulmonary/Chest: Effort normal and breath sounds normal. No stridor. No respiratory distress. She has no wheezes. She has no rales. She exhibits no tenderness.   Abdominal: Soft. Bowel sounds are normal. She exhibits no distension and no mass. There is no tenderness. There is no rebound and no guarding.   Musculoskeletal: Normal range of motion. She exhibits tenderness. She exhibits no edema.   Lymphadenopathy:     She has no cervical adenopathy.   Neurological: She is alert and oriented to person, place, and time.   Skin: Skin is warm and dry. No rash noted. She is not diaphoretic. No erythema. No pallor.   Psychiatric: She has a normal mood and affect. Her behavior is normal. Judgment and thought content normal.   Nursing note and vitals reviewed.      Significant Labs:  CBC:    Recent Labs  Lab 03/26/18  0913   WBC 5.50   RBC 2.60*   HGB 8.1*   HCT 25.9*   *   *   MCH 31.2*   MCHC 31.3*     BMP:    Recent Labs  Lab 03/26/18  0913      K 4.3      CO2 20*   BUN 20   CREATININE 1.2   CALCIUM 9.2        I have reviewed all pertinent labs within the past 24 hours.    Diagnostic Results:  X-Ray: Reviewed

## 2018-03-26 NOTE — CONSULTS
[facility]  Hematology/Oncology  Consult Note    Patient Name: Lena Lynn  MRN: [id number]  Admission Date: 3/26/2018  Hospital Length of Stay: 0 days  Code Status: Full Code   Attending Provider: Erick Nieves MD  Consulting Provider: Yves Carbone MD  Primary Care Physician: Erick Nieves MD  Principal Problem:Macrocytic anemia    Inpatient consult to Hematology/Oncology  Consult performed by: YVES CARBONE  Consult ordered by: SARAH HANDLEY        Subjective:     HPI:  60-year-old status post lung transplant (in 2012 2/2 hypersensitivity pneumonitis, complicated by A2 rejection in 2013, on chronic pred/tacro), PTLPD (DLBCL) diagnosed 8/12/16 s/p 6 cycles of RCHOP now with no evidence of disease on scans, who presents to the hospital with complaint of left shoulder pain.  Hematology consulted for macrocytic anemia.  The patient states she has been having pain in her left shoulder for 2 days and states she feels like she may have slept on it causing the pain.  The patient denies weakness, numbness or tingling.  The patient denies fever, chills, night sweats, weight loss, new lumps or bumps, easy bruising or bleeding.  The patient states she does not remember being told she was anemic in the past.       Oncology Treatment Plan:   OP R-CHOP    Medications:  Continuous Infusions:  Scheduled Meds:   aspirin  81 mg Oral Daily    calcium carbonate  500 mg Oral BID WM    citalopram  20 mg Oral Daily    magnesium oxide  400 mg Oral BID    pantoprazole  40 mg Oral Daily    predniSONE  5 mg Oral Daily    sulfamethoxazole-trimethoprim 800-160mg  1 tablet Oral Every Mon, Wed, Fri    tacrolimus  0.5 mg Oral BID    tacrolimus  1 mg Oral BID     PRN Meds:acetaminophen, diphenhydrAMINE, potassium chloride 10% **AND** potassium chloride 10% **AND** potassium chloride 10%, traMADol, traZODone     Review of patient's allergies indicates:  No Known Allergies     Past Medical  History:   Diagnosis Date    Acute rejection of lung transplant 12/12/2013    CARROLL (acute kidney injury)     Anemia 8/2/2016    Anxiety     Back pain 2016    Recently seen in ED for back pain    Cirrhosis     Depression     Hyperlipidemia     Hypertension     Lung transplant complication 1/10/2014    Lymphoma 8/10/2016    Obesity     DOROTHY (obstructive sleep apnea)     Osteoporosis     Postinflammatory pulmonary fibrosis      Past Surgical History:   Procedure Laterality Date    APPENDECTOMY      Removed at the time of hysterectomy    HYSTERECTOMY      LUNG TRANSPLANT Bilateral 04/25/2012     Family History     Problem Relation (Age of Onset)    Cancer Father, Brother, Maternal Uncle, Paternal Uncle    Colon cancer Father    Heart attack Paternal Aunt    Heart disease Mother    Heart failure Mother    Hypertension Father, Sister, Sister, Sister    No Known Problems Daughter, Son, Maternal Grandmother, Maternal Grandfather, Paternal Grandmother, Paternal Grandfather, Paternal Aunt, Paternal Aunt    Other Father        Social History Main Topics    Smoking status: Never Smoker    Smokeless tobacco: Never Used    Alcohol use No    Drug use: No    Sexual activity: Not Currently       Review of Systems   Constitutional: Negative for chills, fatigue and fever.   HENT: Negative for sore throat and trouble swallowing.    Eyes: Negative for photophobia, pain and visual disturbance.   Respiratory: Negative for chest tightness and shortness of breath.    Cardiovascular: Negative for chest pain, palpitations and leg swelling.   Gastrointestinal: Negative for abdominal pain, constipation, diarrhea, nausea and vomiting.   Genitourinary: Negative for difficulty urinating and dysuria.   Musculoskeletal: Negative for arthralgias and back pain.        Pain in left shoulder   Skin: Negative for color change and rash.   Neurological: Negative for weakness, numbness and headaches.   Hematological: Negative for  adenopathy. Does not bruise/bleed easily.     Objective:     Vital Signs (Most Recent):  Temp: 98.4 °F (36.9 °C) (03/26/18 1657)  Pulse: 96 (03/26/18 1657)  Resp: 16 (03/26/18 1657)  BP: (!) 100/59 (03/26/18 1657)  SpO2: 98 % (03/26/18 1657) Vital Signs (24h Range):  Temp:  [98.2 °F (36.8 °C)-98.8 °F (37.1 °C)] 98.4 °F (36.9 °C)  Pulse:  [] 96  Resp:  [16-18] 16  SpO2:  [96 %-100 %] 98 %  BP: (100-108)/(59-73) 100/59     Weight: 86.2 kg (190 lb)  Body mass index is 28.89 kg/m².  Body surface area is 2.03 meters squared.      Intake/Output Summary (Last 24 hours) at 03/26/18 1741  Last data filed at 03/26/18 0913   Gross per 24 hour   Intake              500 ml   Output                0 ml   Net              500 ml       Physical Exam   Constitutional: She is oriented to person, place, and time. She appears well-developed and well-nourished. No distress.   HENT:   Head: Normocephalic and atraumatic.   Mouth/Throat: No oropharyngeal exudate.   Eyes: EOM are normal. Right eye exhibits no discharge. Left eye exhibits no discharge. No scleral icterus.   Cardiovascular: Normal rate, regular rhythm, normal heart sounds and intact distal pulses.  Exam reveals no gallop and no friction rub.    No murmur heard.  Pulmonary/Chest: Effort normal and breath sounds normal. No respiratory distress. She has no wheezes. She has no rales. She exhibits no tenderness.   Abdominal: Soft. Bowel sounds are normal. She exhibits no distension and no mass. There is no tenderness. There is no rebound and no guarding.   Musculoskeletal: Normal range of motion. She exhibits no edema or tenderness.   Neurological: She is alert and oriented to person, place, and time.   Skin: No rash noted. She is not diaphoretic. No erythema.   Psychiatric: She has a normal mood and affect. Her behavior is normal.       Significant Labs:   Recent Results (from the past 24 hour(s))   Lactic acid, plasma    Collection Time: 03/26/18  8:26 AM   Result Value  Ref Range    Lactate (Lactic Acid) 1.4 0.5 - 2.2 mmol/L   Troponin I    Collection Time: 03/26/18  9:13 AM   Result Value Ref Range    Troponin I 0.012 0.000 - 0.026 ng/mL   TSH    Collection Time: 03/26/18  9:13 AM   Result Value Ref Range    TSH 0.644 0.400 - 4.000 uIU/mL   Comprehensive metabolic panel    Collection Time: 03/26/18  9:13 AM   Result Value Ref Range    Sodium 137 136 - 145 mmol/L    Potassium 4.3 3.5 - 5.1 mmol/L    Chloride 106 95 - 110 mmol/L    CO2 20 (L) 23 - 29 mmol/L    Glucose 91 70 - 110 mg/dL    BUN, Bld 20 8 - 23 mg/dL    Creatinine 1.2 0.5 - 1.4 mg/dL    Calcium 9.2 8.7 - 10.5 mg/dL    Total Protein 5.8 (L) 6.0 - 8.4 g/dL    Albumin 3.1 (L) 3.5 - 5.2 g/dL    Total Bilirubin 1.6 (H) 0.1 - 1.0 mg/dL    Alkaline Phosphatase 105 55 - 135 U/L    AST 46 (H) 10 - 40 U/L    ALT 23 10 - 44 U/L    Anion Gap 11 8 - 16 mmol/L    eGFR if African American 56.4 (A) >60 mL/min/1.73 m^2    eGFR if non  48.9 (A) >60 mL/min/1.73 m^2   CBC auto differential    Collection Time: 03/26/18  9:13 AM   Result Value Ref Range    WBC 5.50 3.90 - 12.70 K/uL    RBC 2.60 (L) 4.00 - 5.40 M/uL    Hemoglobin 8.1 (L) 12.0 - 16.0 g/dL    Hematocrit 25.9 (L) 37.0 - 48.5 %     (H) 82 - 98 fL    MCH 31.2 (H) 27.0 - 31.0 pg    MCHC 31.3 (L) 32.0 - 36.0 g/dL    RDW 14.6 (H) 11.5 - 14.5 %    Platelets 112 (L) 150 - 350 K/uL    MPV 10.1 9.2 - 12.9 fL    Immature Granulocytes 0.9 (H) 0.0 - 0.5 %    Gran # (ANC) 3.8 1.8 - 7.7 K/uL    Immature Grans (Abs) 0.05 (H) 0.00 - 0.04 K/uL    Lymph # 0.9 (L) 1.0 - 4.8 K/uL    Mono # 0.7 0.3 - 1.0 K/uL    Eos # 0.0 0.0 - 0.5 K/uL    Baso # 0.01 0.00 - 0.20 K/uL    nRBC 0 0 /100 WBC    Gran% 68.8 38.0 - 73.0 %    Lymph% 16.7 (L) 18.0 - 48.0 %    Mono% 12.9 4.0 - 15.0 %    Eosinophil% 0.5 0.0 - 8.0 %    Basophil% 0.2 0.0 - 1.9 %    Differential Method Automated    Urinalysis, Reflex to Urine Culture Urine, Clean Catch    Collection Time: 03/26/18  9:28 AM   Result Value  Ref Range    Specimen UA Urine, Clean Catch     Color, UA Sherri Yellow, Straw, Sherri    Appearance, UA Cloudy (A) Clear    pH, UA 5.0 5.0 - 8.0    Specific Gravity, UA 1.025 1.005 - 1.030    Protein, UA 1+ (A) Negative    Glucose, UA Negative Negative    Ketones, UA Negative Negative    Bilirubin (UA) Negative Negative    Occult Blood UA Negative Negative    Nitrite, UA Negative Negative    Urobilinogen, UA Negative <2.0 EU/dL    Leukocytes, UA 2+ (A) Negative   Urinalysis Microscopic    Collection Time: 03/26/18  9:28 AM   Result Value Ref Range    RBC, UA 3 0 - 4 /hpf    WBC, UA 20 (H) 0 - 5 /hpf    Bacteria, UA Many (A) None-Occ /hpf    Squam Epithel, UA 26 /hpf    Hyaline Casts, UA 0 0-1/lpf /lpf    Microscopic Comment SEE COMMENT          Diagnostic Results:  CXR 3/26/18  Vague nodular opacity at the left base superimposed on the left hemidiaphragm.  This was not seen on the recent study of March 12, 2018.  Follow-up studies would seem reasonable as this may merely represent superimposition of vascular structures.    Xray left shoulder  Degenerative change.    Xray left humerus  No significant abnormality. Bones are osteopenic.  Correlation with any point tenderness would be helpful.      Assessment/Plan:     * Macrocytic anemia    -The patient has a macrocytic anemia on laboratory work up  -Would rule out hemolysis and check haptoglobin, LDH.  If haptoglobin low and LDH elevated, would check REMIGIO.  -Would check reticulocyte count  -Would assess for nutritional causes of macrocytic anemia by checking folate and B12.  -Will monitor results of aforementioned lab work.  -Pt to follow up with primary hematologist on d/c.          -Will discuss with staff    Thank you for your consult. I will follow-up with patient. Please contact us if you have any additional questions.    Yves aCrbone MD  Hematology/Oncology  [facility]    Attending Note  I have personally taken the history and examined this  patient and agree with the fellow's note as stated above.  Long standing anemia noted  Agree with work-up as above and can arrange outpatient follow-up

## 2018-03-26 NOTE — ED NOTES
"LOC: The patient is awake, alert and aware of environment with an appropriate affect, the patient is oriented x 3 and speaking appropriately.  APPEARANCE: Patient resting comfortably and in no acute distress, patient is clean and well groomed, patient's clothing is properly fastened.  SKIN: The skin is warm and dry, intact, patient has normal skin turgor and moist mucus membranes.   RESPIRATORY: Airway is open and patent, respirations are spontaneous, patient has a normal effort and rate.  CARDIAC: Patient has a normal rate and rhythm, no periphreal edema noted, capillary refill < 3 seconds. Bilateral radial pulses +2  ABDOMEN: Soft and non tender to palpation, no distention noted. Bowel sounds present  NEUROLOGIC: PERRL, facial expression is symmetrical, patient moving all extremities spontaneously, normal sensation in all extremities when touched with a finger. Follows all commands appropriately  MUSCULOSKELETAL: No obvious deformities. Full ROM in all 4 extremities.     Pt c/o "left shoulder pain with any movement started about 2 days ago and getting worse" lrom noted d/t pain.  + pulses+ sensation good cap refill   "

## 2018-03-26 NOTE — ED PROVIDER NOTES
"Encounter Date: 3/26/2018       History     Chief Complaint   Patient presents with    Arm Problem     pain in left shoulder and arm for last two days, denies trauma. [place] light headed yesterday     HPI     Lena Lynn is a 62 y.o. female w/ hx of lymphoma w/ vertebral lytic lesion s/p chemo, lung transplantation 2/2 idiopathic pulm fibrosis, HTN, osteoporosis, presents for L shoulder and arm pain x 2 days. Said she woke up 2 days ago w/ L arm pain and "heaviness". Pain constant, 9/10, dull and achy, worse w/ lying on it and w/ movement or direct pressure. Denies nausea, vomiting, diarrhea, diaphoresis, fever, chills. Denies chest pain or worsen SOB compared to baseline SOB. Denies cough, URI symptoms.  She also felt lightheaded yesterday w/ activities but resolved when she rested.  Pt notes that her BP is usually 120 systolic but today is slightly low 102 systolic.  No recent travel or prolonged immobilization or estrogen use.  Currently taking daily prednisone, ppx abx (bactrim & azithro), alendronate.    Review of patient's allergies indicates:  No Known Allergies  Past Medical History:   Diagnosis Date    Acute rejection of lung transplant 12/12/2013    CARROLL (acute kidney injury)     Anemia 8/2/2016    Anxiety     Back pain 2016    Recently seen in ED for back pain    Cirrhosis     Depression     Hyperlipidemia     Hypertension     Lung transplant complication 1/10/2014    Lymphoma 8/10/2016    Obesity     DOROTHY (obstructive sleep apnea)     Osteoporosis     Postinflammatory pulmonary fibrosis      Past Surgical History:   Procedure Laterality Date    APPENDECTOMY      Removed at the time of hysterectomy    HYSTERECTOMY      LUNG TRANSPLANT Bilateral 04/25/2012     Family History   Problem Relation Age of Onset    Heart failure Mother     Heart disease Mother     Hypertension Father     Other Father      Complications from surgery of the colon    Cancer Father     Colon cancer Father  "    Hypertension Sister     Cancer Brother     No Known Problems Daughter     No Known Problems Son     Cancer Maternal Uncle     Heart attack Paternal Aunt     Cancer Paternal Uncle     No Known Problems Maternal Grandmother     No Known Problems Maternal Grandfather     No Known Problems Paternal Grandmother     No Known Problems Paternal Grandfather     Hypertension Sister     Hypertension Sister     No Known Problems Paternal Aunt     No Known Problems Paternal Aunt      Social History   Substance Use Topics    Smoking status: Never Smoker    Smokeless tobacco: Never Used    Alcohol use No     Review of Systems   Constitutional: Negative for chills, diaphoresis and fever.   HENT: Negative for congestion, rhinorrhea, sinus pain, sore throat and trouble swallowing.    Eyes: Negative for discharge and itching.   Respiratory: Positive for shortness of breath (at baseline). Negative for cough, chest tightness and wheezing.    Cardiovascular: Negative for chest pain, palpitations and leg swelling.   Gastrointestinal: Negative for abdominal pain, diarrhea, nausea and vomiting.   Endocrine: Negative for polyuria.   Genitourinary: Negative for decreased urine volume, frequency and urgency.   Musculoskeletal: Positive for arthralgias and myalgias.   Neurological: Positive for light-headedness. Negative for weakness.       Physical Exam     Initial Vitals [03/26/18 0656]   BP Pulse Resp Temp SpO2   102/67 (!) 119 18 98.8 °F (37.1 °C) 96 %      MAP       78.67         Physical Exam    Nursing note and vitals reviewed.  Constitutional: She appears well-developed and well-nourished. She is not diaphoretic.  Non-toxic appearance. She does not have a sickly appearance. No distress.   HENT:   Head: Normocephalic and atraumatic.   Eyes: EOM are normal. Pupils are equal, round, and reactive to light. No scleral icterus.   Neck: Normal range of motion.   Cardiovascular: Regular rhythm, S1 normal, S2 normal, normal  heart sounds, intact distal pulses and normal pulses. Tachycardia present.  Exam reveals no gallop and no distant heart sounds.    No murmur heard.  Pulmonary/Chest: Effort normal and breath sounds normal. No accessory muscle usage. No tachypnea and no bradypnea. No respiratory distress. She has no wheezes. She has no rhonchi. She has no rales. She exhibits no tenderness, no bony tenderness and no crepitus.       Abdominal: Soft. Normal appearance and bowel sounds are normal. She exhibits no distension. There is no tenderness. There is no guarding.   Musculoskeletal: She exhibits tenderness. She exhibits no edema.        Arms:  Neurological: She is alert and oriented to person, place, and time. She has normal strength.   Skin: Skin is warm and dry. Capillary refill takes less than 2 seconds.         ED Course   Procedures  Labs Reviewed   TROPONIN I   TSH   COMPREHENSIVE METABOLIC PANEL   CBC W/ AUTO DIFFERENTIAL     EKG Readings: (Independently Interpreted)   Initial Reading: No STEMI. Previous EKG: Compared with most recent EKG Rhythm: Sinus Tachycardia. Heart Rate: 115. Ectopy: No Ectopy. Conduction: Normal. ST Segments: Normal ST Segments. T Waves: Normal. Axis: Indeterminate.                APC / Resident Notes:   Lena Lynn is a 62 y.o. Female hx of lymphoma w/ vertebral lytic lesion s/p chemo, lung transplantation 2/2 idiopathic pulm fibrosis, HTN, osteoporosis, presents for L shoulder and arm pain x 2 days.  On exam, well appearing, not in acute distress. Afebrile, slightly tachycardic , soft BP w/ . Lungs CTAB. Regular rhythm. Abd s/nt/nd. MSK w/ FROM. Has TTP to L humerus w/o significant swelling/erythema compared to R side. Calves nontender and symmetric.  Ddx includes acs, pna, neoplasm/lytic lesion, MSK pain, frozen shoulder, DVT, dehydration, infectious causes. Also consider PE. Wells score 1.5 (for tachycardia).  Will get cardiac enzymes, ekg, cbc, cmp, tsh, lactate, cxr, L shoulder  and humerus xray, UA.  Will also give IVF bolus. Last echo was Dec 2016 w/ normal LV/RV function, EF > 60%.    -- Opal Anderson 8:00am --    UPDATE  Bedside U/S of LUE showed fully compressible veins. No evidence of DVT.  Labs notable for bicarb 20, TBili 1.6 w/ mildly elevated AST 46.  CBC notable for macrocytic anemia w/ H/H of 8.1/25. Pt has a significant drop of Hb from 12.3 two months ago to 8.1 today. Denies black tarry stool, no blood in stool, no hemoptysis, no hematuria.  Bedside FOBT shows normal light brown stool, negative for blood.  CXR notable for elevated L hemidiaphragm consistent w/ prior CXR, also questionable nodule that wasn't there 3 weeks ago, recs follow up studies.  XR L shoulder shows bone spur. XR L humerus notable for osteopenia but w/o bone lesions or fx.    I touched base w/ heme/onc oncall about pt. Pt last received chemo in Dec 2016, which was not within the past 6 months. Nothing to do on heme/onc stand point.  Will touch base w/ transplant team about pt.    Anticipate admission for symptomatic anemia.  --- Opal Anderson - GOMEZY2 EM -- 03/26/2018 10:29 AM ---    UPDATE  Spoke w/ transplant NP about pt w/ concern for anemia. Pt is admitted to observation.  --- Opal Hull PGY2 EM -- 03/26/2018 11:37 AM ---                  ED Course as of Mar 26 0736   Mon Mar 26, 2018   0701 Pulse: (!) 119 [LP]   0701 Temp src: Oral [LP]      ED Course User Index  [LP] Opal Anderson MD     Clinical Impression:   The primary encounter diagnosis was Anemia, unspecified type. Diagnoses of Shoulder pain, left and Arm pain were also pertinent to this visit.                           Opal Anderson MD  Resident  03/26/18 7816

## 2018-03-27 NOTE — ASSESSMENT & PLAN NOTE
H/H stable, 8.2/26.4 today, downward tend since December 2017.   No signs of acute bleed, FOBT neg in ED.  Hypotensive and tachycardic this morning, 500cc bolus given.  No evidence of orthostatic hypotension, repeat orthostatics were as follows:  Supine - BP:103/68 HR:101  Standing -1 min BP: 95/60 HR: 109; 3 min BP: 97/63, HR:115    History of lymphoma with vertebral lytic lesion s/p chemo.   Heme/onc consulted, appreciate recs.   B12, folate, LDH, haptoglobin ordered to determine etiology of macrocytic anemia.

## 2018-03-27 NOTE — HPI
62 year old female  status post bilateral lung transplant (4/25/2012) 2/2 hypersensitivity pneumonitis, A2 rejection in 12/2013 and PTLD w/ vertebral lytic lesion s/p chemo in 2016 on who GI was consulted to work-up for possible GI bleed.      She presented to the hospital due to progressive arm pain that is exacerbated with use. She has been experiencing some light headed and fatigue as well. She denies abdominal pain, hematochezia, melena or hematemesis. She has had no nausea, vomiting, fever or chills. She also denies chest pain, palpitations, SOB, dysuria, hematuria, constipation or diarrhea.     Patient states last colonoscopy was 5 years ago and denies any colonic findings. Patient denies any history of upper endoscopy being performed. She also denies any family history of colonic disease.     Patient is a non-drinker and non-smoker. She has not taken NSAIDs since her lung transplant in 2012.

## 2018-03-27 NOTE — ASSESSMENT & PLAN NOTE
62 year old female  status post bilateral lung transplant (4/25/2012) 2/2 hypersensitivity pneumonitis, A2 rejection in 12/2013 and PTLD w/ vertebral lytic lesion s/p chemo in 2016 on who GI was consulted to work-up for possible GI bleed.     After speaking to patient and reviewing history she has a macrocytic anemia with no overt signs of bleeding and a normal rectal exam. In addition she has an elevated bili/LDH which would point to hemolysis but haptoglobin is normal. At this point we think her anemia is most likely related to possible B12/folate deficiency or secondary to prior chemotherapy. We do not suspect a GI bleed at this time.    Recommendations:  --Obtain B12 and folate and replace as needed   --No need for inpatient EGD/Colon

## 2018-03-27 NOTE — PLAN OF CARE
Problem: Patient Care Overview  Goal: Plan of Care Review  Outcome: Ongoing (interventions implemented as appropriate)  Pt is AOOx4, VSS. Pt. Able to turn self. PRN pain med given for c/o of L arm pain with relief reported. SpO2 stable. Pt. Resting in bed. Bed alarm in place. Free of injuries or falls. Safety maintained. Call light within reach. TM.

## 2018-03-27 NOTE — ASSESSMENT & PLAN NOTE
Continue outpatient regimen - tacrolimus 1.5 mg BID, prednisone 5 mg daily.     Daily tacrolimus levels, will adjust dose as needed. Today's level is 7.3.

## 2018-03-27 NOTE — SUBJECTIVE & OBJECTIVE
Past Medical History:   Diagnosis Date    Acute rejection of lung transplant 12/12/2013    CARROLL (acute kidney injury)     Anemia 8/2/2016    Anxiety     Back pain 2016    Recently seen in ED for back pain    Cirrhosis     Depression     Hyperlipidemia     Hypertension     Lung transplant complication 1/10/2014    Lymphoma 8/10/2016    Obesity     DOROTHY (obstructive sleep apnea)     Osteoporosis     Postinflammatory pulmonary fibrosis        Past Surgical History:   Procedure Laterality Date    APPENDECTOMY      Removed at the time of hysterectomy    HYSTERECTOMY      LUNG TRANSPLANT Bilateral 04/25/2012       Review of patient's allergies indicates:  No Known Allergies  Family History     Problem Relation (Age of Onset)    Cancer Father, Brother, Maternal Uncle, Paternal Uncle    Colon cancer Father    Heart attack Paternal Aunt    Heart disease Mother    Heart failure Mother    Hypertension Father, Sister, Sister, Sister    No Known Problems Daughter, Son, Maternal Grandmother, Maternal Grandfather, Paternal Grandmother, Paternal Grandfather, Paternal Aunt, Paternal Aunt    Other Father        Social History Main Topics    Smoking status: Never Smoker    Smokeless tobacco: Never Used    Alcohol use No    Drug use: No    Sexual activity: Not Currently     Review of Systems   Constitutional: Positive for fatigue.   HENT: Negative for trouble swallowing.    Respiratory: Negative.    Cardiovascular: Negative.    Gastrointestinal: Negative.    Genitourinary: Negative.    Neurological: Negative for weakness.     Objective:     Vital Signs (Most Recent):  Temp: 98.1 °F (36.7 °C) (03/27/18 1524)  Pulse: 85 (03/27/18 1524)  Resp: 16 (03/27/18 1524)  BP: 102/68 (03/27/18 1524)  SpO2: 98 % (03/27/18 1524) Vital Signs (24h Range):  Temp:  [97.7 °F (36.5 °C)-99.1 °F (37.3 °C)] 98.1 °F (36.7 °C)  Pulse:  [] 85  Resp:  [16-18] 16  SpO2:  [93 %-100 %] 98 %  BP: ()/(52-81) 102/68     Weight: 90.4 kg  (199 lb 5 oz) (03/27/18 0400)  Body mass index is 30.31 kg/m².      Intake/Output Summary (Last 24 hours) at 03/27/18 1655  Last data filed at 03/27/18 0537   Gross per 24 hour   Intake              600 ml   Output                0 ml   Net              600 ml       Lines/Drains/Airways     Central Venous Catheter Line                 Port A Cath Single Lumen 10/03/16 1200 540 days          Peripheral Intravenous Line                 Peripheral IV - Single Lumen 03/26/18 0815 1 day                Physical Exam   Constitutional: She is oriented to person, place, and time. No distress.   HENT:   Head: Normocephalic and atraumatic.   Eyes: No scleral icterus.   Neck: Normal range of motion.   Cardiovascular: Normal rate and regular rhythm.    Pulmonary/Chest: Effort normal and breath sounds normal.   Abdominal: Soft. Bowel sounds are normal.   ANYA with no external hemorrhoids and yellow stool   Musculoskeletal: She exhibits no edema.   Neurological: She is alert and oriented to person, place, and time.   Skin: She is not diaphoretic.       Significant Labs:  CBC:   Recent Labs  Lab 03/26/18  0913 03/27/18  0344   WBC 5.50 4.81   HGB 8.1* 8.2*   HCT 25.9* 26.4*   * 117*     CMP:   Recent Labs  Lab 03/27/18  0344   GLU 95   CALCIUM 9.2   ALBUMIN 3.1*   PROT 6.0   *   K 4.4   CO2 22*      BUN 23   CREATININE 1.3   ALKPHOS 121   ALT 26   AST 51*   BILITOT 2.1*     Coagulation: No results for input(s): PT, INR, APTT in the last 48 hours.    Significant Imaging:  Imaging results within the past 24 hours have been reviewed.

## 2018-03-27 NOTE — DISCHARGE SUMMARY
"[facility]  Lung Transplant  Discharge Summary      Patient Name: Lena Lynn  MRN: [id number]  Admission Date: 3/26/2018  Hospital Length of Stay: 0 days  Discharge Date and Time: 03/27/2018 6:01 PM  Attending Physician: Erick Nieves MD   Discharging Provider: Shavon Batres DO  Primary Care Provider: Erick Nieves MD     HPI: Lena Lynn is a 62 y.o. female  s/p bilateral lung transplant (4/25/2012) 2/2 hypersensitivity pneumonitis, A2 rejection in 12/2013  wth history of PTLD w/ vertebral lytic lesion s/p chemo, and osteoporosis who presented to the ED with left shoulder/arm pain x 2 days. Patient states arm pain is progressive, described as a "heaviness" that is constant, exacerbated with use, palpation and laying on that arm. Patient denies prior episodes. She also complained of lightheadedness which resolved with administration of IVF in the ED.    Patient has had ongoing LIRIANO unchanged from last OV, last seen in outpatient lung transplant clinic 3/12 for routine f/u. She underwent bronch for worsening dyspnea on 1/23/18 which was culture negative/A0/B0. FEV1 has remained stable outpatient. ED workup revealed macrocytic anemia (H/H 8.1/25.9 today, most recently 9.2/29.8 on 3/12), troponin, TSH wnl, UA with 2+leuks. Patient denies palpitations, chest pain, SOB, fevers, chills, dysuria, abdominal pain, n/v/d. No recent travel or sick contacts.         * No surgery found *     Hospital Course: Lena Lynn was admitted for symptomatic anemia that was found when she presented for left arm and neck pain.  On admission, she complained of left sided neck pain and muscle tightness that she associated with sleeping wrong.  She then developed pain down her left arm that prompted ED evaluation.  Once in the ED, she had normal troponin, EKG, CXR, and plain film imaging of her left arm and shoulder.  She also endorsed some fatigue and dizziness upon standing.  Her Hgb was found " to be 8.1 which shows a downward trend the past couple months.  She has had a history of lymphoma which presented similarly in the past and therefore hematology/oncology was consulted.  They recommended B12/folate testing which was ordered but unfortunately not drawn prior to blood administration.  LDH was noted to be 1200 and haptoglobin was normal.  She remained with occasional dizziness and hypotension after admission and was transfused 1 unit of PRBCs due to symptomatic anemia.  GI was consulted and did not recommend any inpatient endoscopies at this time.        She has had improvement in her symptoms and will be discharged after completion of her RBC transfusion.  Will plan to repeat labs in 48-72 hours and follow-up in clinic in 2 weeks.  She is instructed to call the answering service should she develop dizziness, hypotension, or syncope.     Consults         Status Ordering Provider     Inpatient consult to Gastroenterology  Once     Provider:  (Not yet assigned)    Completed SARAH HANDLEY.     Inpatient consult to Hematology/Oncology  Once     Provider:  (Not yet assigned)    Completed SARAH HANDLEY     Inpatient consult to Lung Transplant  Once     Provider:  (Not yet assigned)    Completed CONSTANTINE KNAPP          Significant Diagnostic Studies: Labs: All labs within the past 24 hours have been reviewed  Radiology: X-Ray: CXR/left shoulder/left arm plain films reviewed    Pending Diagnostic Studies:     None        Final Active Diagnoses:    Diagnosis Date Noted POA    PRINCIPAL PROBLEM:  Anemia [D64.9] 08/02/2016 Yes    Shoulder pain, left [M25.512] 03/26/2018 Yes    Prophylactic antibiotic [Z79.2] 03/26/2018 Not Applicable    Stage 3 chronic kidney disease [N18.3] 01/11/2017 Yes    Immunosuppression [D89.9] 09/06/2012 Yes    Lung replaced by transplant [Z94.2] 07/12/2012 Not Applicable      Problems Resolved During this Admission:    Diagnosis Date Noted Date Resolved POA       Discharged Condition: good    Disposition: Home or Self Care    Medications:  Reconciled Home Medications:      Medication List      CONTINUE taking these medications    alendronate 70 MG tablet  Commonly known as:  FOSAMAX  TAKE 1 TABLET EVERY 7 DAYS     aspirin 81 MG EC tablet  Commonly known as:  ECOTRIN  Take 1 tablet (81 mg total) by mouth once daily.     calcium carbonate 600 mg calcium (1,500 mg) Tab  Commonly known as:  OS-JERONIMO  Take 1 tablet (600 mg total) by mouth 2 (two) times daily with meals.     citalopram 20 MG tablet  Commonly known as:  CELEXA  TAKE 1 TABLET EVERY DAY     diphenhydrAMINE 25 mg capsule  Commonly known as:  BENADRYL     hydrocodone-acetaminophen 5-325mg 5-325 mg per tablet  Commonly known as:  NORCO  Take 1 tablet by mouth every 6 (six) hours as needed for Pain.     magnesium oxide 400 mg tablet  Commonly known as:  MAG-OX  Take 1 tablet (400 mg total) by mouth 2 (two) times daily.     omeprazole 40 MG capsule  Commonly known as:  PRILOSEC  Take 1 capsule (40 mg total) by mouth once daily.     ONE DAILY MULTIVITAMIN per tablet  Generic drug:  multivitamin     predniSONE 5 MG tablet  Commonly known as:  DELTASONE  Take 1 tablet (5 mg total) by mouth once daily.     sulfamethoxazole-trimethoprim 800-160mg 800-160 mg Tab  Commonly known as:  BACTRIM DS  Take 1 tablet by mouth every Mon, Wed, Fri.     * tacrolimus 0.5 MG Cap  Commonly known as:  PROGRAF  Take 1 capsule (0.5 mg total) by mouth every 12 (twelve) hours.     * tacrolimus 1 MG Cap  Commonly known as:  PROGRAF  Take 1 capsule (1 mg total) by mouth every 12 (twelve) hours. Daily doses: 1.5 mg every 12 hours     traMADol 50 mg tablet  Commonly known as:  ULTRAM  TAKE 1 TABLET EVERY 6 HOURS AS NEEDED FOR PAIN     traZODone 50 MG tablet  Commonly known as:  DESYREL  Please provide 3 months supply I pill PRN QHS Insomnia        * This list has 2 medication(s) that are the same as other medications prescribed for you. Read the  directions carefully, and ask your doctor or other care provider to review them with you.              Patient Instructions:     Diet Adult Regular     Activity as tolerated     Notify your health care provider if you experience any of the following:  temperature >100.4     Notify your health care provider if you experience any of the following:  difficulty breathing or increased cough     Notify your health care provider if you experience any of the following:  persistent dizziness, light-headedness, or visual disturbances       Follow Up:  Follow-up Information     Erick Nieves MD In 2 weeks.    Specialty:  Transplant  Contact information:  [address]  [facility] [id number]  [phone]                   Shavon Batres DO  Lung Transplant  [facility]

## 2018-03-27 NOTE — SUBJECTIVE & OBJECTIVE
Subjective:     Interval History: No acute events overnight. Patient states lightheadedness has improved with IVF administration overnight. She continues to complain of left arm pain which is relieved with tramadol and tylenol. No other complaints at this time. Heme/Onc and GI teams consulted for macrocytic anemia.     Continuous Infusions:  Scheduled Meds:   aspirin  81 mg Oral Daily    calcium carbonate  500 mg Oral BID WM    citalopram  20 mg Oral Daily    magnesium oxide  400 mg Oral BID    pantoprazole  40 mg Oral Daily    predniSONE  5 mg Oral Daily    sulfamethoxazole-trimethoprim 800-160mg  1 tablet Oral Every Mon, Wed, Fri    tacrolimus  0.5 mg Oral BID    tacrolimus  1 mg Oral BID     PRN Meds:acetaminophen, diphenhydrAMINE, potassium chloride 10% **AND** potassium chloride 10% **AND** potassium chloride 10%, traMADol, traZODone    Review of patient's allergies indicates:  No Known Allergies    Review of Systems   Constitutional: Positive for fatigue (unchanged). Negative for activity change, appetite change, chills, diaphoresis and fever.   HENT: Negative for congestion, ear discharge, ear pain, facial swelling, hearing loss, mouth sores, nosebleeds, postnasal drip, rhinorrhea, sinus pressure, sore throat, trouble swallowing and voice change.    Eyes: Negative for pain, discharge, redness and itching.   Respiratory: Negative for apnea, cough, choking, chest tightness, shortness of breath, wheezing and stridor.         LIRIANO   Cardiovascular: Negative for chest pain, palpitations and leg swelling.   Gastrointestinal: Negative for abdominal distention, abdominal pain, anal bleeding, blood in stool, constipation, diarrhea, nausea, rectal pain and vomiting.   Endocrine: Negative for cold intolerance and heat intolerance.   Genitourinary: Negative for difficulty urinating, dysuria and flank pain.   Musculoskeletal: Negative for arthralgias, back pain, joint swelling, myalgias and neck pain.        Left  shoulder pain   Skin: Negative.    Allergic/Immunologic: Positive for immunocompromised state.   Neurological: Negative for dizziness, tremors, weakness, light-headedness, numbness and headaches.   Psychiatric/Behavioral: Negative for agitation and hallucinations. The patient is not nervous/anxious.      Objective:   Physical Exam   Constitutional: She is oriented to person, place, and time. She appears well-developed and well-nourished. No distress.   HENT:   Head: Normocephalic and atraumatic.   Nose: Nose normal.   Mouth/Throat: Oropharynx is clear and moist. No oropharyngeal exudate.   Eyes: Conjunctivae and EOM are normal. Pupils are equal, round, and reactive to light. Right eye exhibits no discharge. Left eye exhibits no discharge. No scleral icterus.   Neck: Normal range of motion. Neck supple. No JVD present. No tracheal deviation present. No thyromegaly present.   Cardiovascular: Regular rhythm, normal heart sounds and intact distal pulses.  Exam reveals no gallop and no friction rub.    No murmur heard.  Tachycardic   Pulmonary/Chest: Effort normal and breath sounds normal. No stridor. No respiratory distress. She has no wheezes. She has no rales. She exhibits no tenderness.   Abdominal: Soft. Bowel sounds are normal. She exhibits no distension and no mass. There is no tenderness. There is no rebound and no guarding.   Musculoskeletal: Normal range of motion. She exhibits tenderness. She exhibits no edema.   Lymphadenopathy:     She has no cervical adenopathy.   Neurological: She is alert and oriented to person, place, and time.   Skin: Skin is warm and dry. No rash noted. She is not diaphoretic. No erythema. No pallor.   Psychiatric: She has a normal mood and affect. Her behavior is normal. Judgment and thought content normal.   Nursing note and vitals reviewed.        Vital Signs (Most Recent):  Temp: 98.5 °F (36.9 °C) (03/27/18 0852)  Pulse: (!) 118 (03/27/18 0908)  Resp: 16 (03/27/18 0852)  BP: (!)  84/52 (03/27/18 0908)  SpO2: 97 % (03/27/18 0852) Vital Signs (24h Range):  Temp:  [98.2 °F (36.8 °C)-99.1 °F (37.3 °C)] 98.5 °F (36.9 °C)  Pulse:  [] 118  Resp:  [16-18] 16  SpO2:  [97 %-100 %] 97 %  BP: ()/(52-81) 84/52     Weight: 90.4 kg (199 lb 5 oz)  Body mass index is 30.31 kg/m².      Intake/Output Summary (Last 24 hours) at 03/27/18 0957  Last data filed at 03/27/18 0537   Gross per 24 hour   Intake              600 ml   Output                0 ml   Net              600 ml       Significant Labs:  CBC:    Recent Labs  Lab 03/27/18 0344   WBC 4.81   RBC 2.68*   HGB 8.2*   HCT 26.4*   *   MCV 99*   MCH 30.6   MCHC 31.1*     BMP:    Recent Labs  Lab 03/27/18 0344   *   K 4.4      CO2 22*   BUN 23   CREATININE 1.3   CALCIUM 9.2      Tacrolimus Levels:    Recent Labs  Lab 03/27/18  0344   TACROLIMUS 7.3     Microbiology:  Microbiology Results (last 7 days)     Procedure Component Value Units Date/Time    Urine culture [[id number]] Collected:  03/26/18 0928    Order Status:  No result Specimen:  Urine Updated:  03/26/18 1019          I have reviewed all pertinent labs within the past 24 hours.    Diagnostic Results:  Labs: Reviewed

## 2018-03-27 NOTE — PROGRESS NOTES
[facility]  Lung Transplant  Progress Note - Floor    Patient Name: Lena Lynn  MRN: [id number]  Admission Date: 3/26/2018  Hospital Length of Stay: 0 days  Post-Operative Day: 2162  Attending Physician: Erick Nieves MD  Primary Care Provider: Erick Nieves MD     Subjective:     Interval History: No acute events overnight. Patient states lightheadedness has improved with IVF administration overnight. She continues to complain of left arm pain which is relieved with tramadol and tylenol. No other complaints at this time. Heme/Onc and GI teams consulted for macrocytic anemia.     Continuous Infusions:  Scheduled Meds:   aspirin  81 mg Oral Daily    calcium carbonate  500 mg Oral BID WM    citalopram  20 mg Oral Daily    magnesium oxide  400 mg Oral BID    pantoprazole  40 mg Oral Daily    predniSONE  5 mg Oral Daily    sulfamethoxazole-trimethoprim 800-160mg  1 tablet Oral Every Mon, Wed, Fri    tacrolimus  0.5 mg Oral BID    tacrolimus  1 mg Oral BID     PRN Meds:acetaminophen, diphenhydrAMINE, potassium chloride 10% **AND** potassium chloride 10% **AND** potassium chloride 10%, traMADol, traZODone    Review of patient's allergies indicates:  No Known Allergies    Review of Systems   Constitutional: Positive for fatigue (unchanged). Negative for activity change, appetite change, chills, diaphoresis and fever.   HENT: Negative for congestion, ear discharge, ear pain, facial swelling, hearing loss, mouth sores, nosebleeds, postnasal drip, rhinorrhea, sinus pressure, sore throat, trouble swallowing and voice change.    Eyes: Negative for pain, discharge, redness and itching.   Respiratory: Negative for apnea, cough, choking, chest tightness, shortness of breath, wheezing and stridor.         LIRIANO   Cardiovascular: Negative for chest pain, palpitations and leg swelling.   Gastrointestinal: Negative for abdominal distention, abdominal pain, anal bleeding, blood in stool,  constipation, diarrhea, nausea, rectal pain and vomiting.   Endocrine: Negative for cold intolerance and heat intolerance.   Genitourinary: Negative for difficulty urinating, dysuria and flank pain.   Musculoskeletal: Negative for arthralgias, back pain, joint swelling, myalgias and neck pain.        Left shoulder pain   Skin: Negative.    Allergic/Immunologic: Positive for immunocompromised state.   Neurological: Negative for dizziness, tremors, weakness, light-headedness, numbness and headaches.   Psychiatric/Behavioral: Negative for agitation and hallucinations. The patient is not nervous/anxious.      Objective:   Physical Exam   Constitutional: She is oriented to person, place, and time. She appears well-developed and well-nourished. No distress.   HENT:   Head: Normocephalic and atraumatic.   Nose: Nose normal.   Mouth/Throat: Oropharynx is clear and moist. No oropharyngeal exudate.   Eyes: Conjunctivae and EOM are normal. Pupils are equal, round, and reactive to light. Right eye exhibits no discharge. Left eye exhibits no discharge. No scleral icterus.   Neck: Normal range of motion. Neck supple. No JVD present. No tracheal deviation present. No thyromegaly present.   Cardiovascular: Regular rhythm, normal heart sounds and intact distal pulses.  Exam reveals no gallop and no friction rub.    No murmur heard.  Tachycardic   Pulmonary/Chest: Effort normal and breath sounds normal. No stridor. No respiratory distress. She has no wheezes. She has no rales. She exhibits no tenderness.   Abdominal: Soft. Bowel sounds are normal. She exhibits no distension and no mass. There is no tenderness. There is no rebound and no guarding.   Musculoskeletal: Normal range of motion. She exhibits tenderness. She exhibits no edema.   Lymphadenopathy:     She has no cervical adenopathy.   Neurological: She is alert and oriented to person, place, and time.   Skin: Skin is warm and dry. No rash noted. She is not diaphoretic. No  erythema. No pallor.   Psychiatric: She has a normal mood and affect. Her behavior is normal. Judgment and thought content normal.   Nursing note and vitals reviewed.        Vital Signs (Most Recent):  Temp: 98.5 °F (36.9 °C) (03/27/18 0852)  Pulse: (!) 118 (03/27/18 0908)  Resp: 16 (03/27/18 0852)  BP: (!) 84/52 (03/27/18 0908)  SpO2: 97 % (03/27/18 0852) Vital Signs (24h Range):  Temp:  [98.2 °F (36.8 °C)-99.1 °F (37.3 °C)] 98.5 °F (36.9 °C)  Pulse:  [] 118  Resp:  [16-18] 16  SpO2:  [97 %-100 %] 97 %  BP: ()/(52-81) 84/52     Weight: 90.4 kg (199 lb 5 oz)  Body mass index is 30.31 kg/m².      Intake/Output Summary (Last 24 hours) at 03/27/18 0957  Last data filed at 03/27/18 0537   Gross per 24 hour   Intake              600 ml   Output                0 ml   Net              600 ml       Significant Labs:  CBC:    Recent Labs  Lab 03/27/18 0344   WBC 4.81   RBC 2.68*   HGB 8.2*   HCT 26.4*   *   MCV 99*   MCH 30.6   MCHC 31.1*     BMP:    Recent Labs  Lab 03/27/18  0344   *   K 4.4      CO2 22*   BUN 23   CREATININE 1.3   CALCIUM 9.2      Tacrolimus Levels:    Recent Labs  Lab 03/27/18  0344   TACROLIMUS 7.3     Microbiology:  Microbiology Results (last 7 days)     Procedure Component Value Units Date/Time    Urine culture [[id number]] Collected:  03/26/18 0928    Order Status:  No result Specimen:  Urine Updated:  03/26/18 1019          I have reviewed all pertinent labs within the past 24 hours.    Diagnostic Results:  Labs: Reviewed      Assessment/Plan:     * Anemia    H/H stable, 8.2/26.4 today, downward tend since December 2017.   No signs of acute bleed, FOBT neg in ED.  Hypotensive and tachycardic this morning, 500cc bolus given.  No evidence of orthostatic hypotension, repeat orthostatics were as follows:  Supine - BP:103/68 HR:101  Standing -1 min BP: 95/60 HR: 109; 3 min BP: 97/63, HR:115    History of lymphoma with vertebral lytic lesion s/p chemo.   Heme/onc consulted,  appreciate recs.   B12, folate, LDH, haptoglobin ordered to determine etiology of macrocytic anemia.         Lung replaced by transplant    History of A2 rejection in 2013, most recent bronchoscopy on 1/23 without evidence of rejection, BAL cx wnl.     Continue current outpatient immunosuppression and ppx.         Immunosuppression    Continue outpatient regimen - tacrolimus 1.5 mg BID, prednisone 5 mg daily.     Daily tacrolimus levels, will adjust dose as needed. Today's level is 7.3.        Prophylactic antibiotic    Continue outpatient regimen with bactrim DS TIW.         Stage 3 chronic kidney disease    BUN/Cr 23/1.3.   Will continue to monitor on daily labs.         Shoulder pain, left    CXR Pa/lat unchanged from prior imaging - Persistent elevation of the left hemidiaphragm, kyphosis and compression fracture in the midthoracic spine without change, evidence of osteopenia.  XR left humerus/shoulder - most c/w degenerative changes, no new lesions.     Troponins neg, imaging unremarkable, hemodynamically stable.   History of lymphoma with veretral lytic lesion s/p chemo - likely contributing to shoulder pain.             Ekaterina De La Cruz PA-C  Lung Transplant  [facility]

## 2018-03-27 NOTE — CONSULTS
[facility]  Gastroenterology  Consult Note    Patient Name: Lena Lynn  MRN: [id number]  Admission Date: 3/26/2018  Hospital Length of Stay: 0 days  Code Status: Full Code   Attending Provider: Erick Nieves MD   Consulting Provider: Iqra Barrera MD  Primary Care Physician: Erick Nieves MD  Principal Problem:Anemia    Inpatient consult to Gastroenterology  Consult performed by: IQRA BARRERA  Consult ordered by: SARAH HANDLEY        Subjective:     HPI:  62 year old female  status post bilateral lung transplant (4/25/2012) 2/2 hypersensitivity pneumonitis, A2 rejection in 12/2013 and PTLD w/ vertebral lytic lesion s/p chemo in 2016 on who GI was consulted to work-up for possible GI bleed.      She presented to the hospital due to progressive arm pain that is exacerbated with use. She has been experiencing some light headed and fatigue as well. She denies abdominal pain, hematochezia, melena or hematemesis. She has had no nausea, vomiting, fever or chills. She also denies chest pain, palpitations, SOB, dysuria, hematuria, constipation or diarrhea.     Patient states last colonoscopy was 5 years ago and denies any colonic findings. Patient denies any history of upper endoscopy being performed. She also denies any family history of colonic disease.     Patient is a non-drinker and non-smoker. She has not taken NSAIDs since her lung transplant in 2012.     Past Medical History:   Diagnosis Date    Acute rejection of lung transplant 12/12/2013    CARROLL (acute kidney injury)     Anemia 8/2/2016    Anxiety     Back pain 2016    Recently seen in ED for back pain    Cirrhosis     Depression     Hyperlipidemia     Hypertension     Lung transplant complication 1/10/2014    Lymphoma 8/10/2016    Obesity     DOROTHY (obstructive sleep apnea)     Osteoporosis     Postinflammatory pulmonary fibrosis        Past Surgical History:   Procedure Laterality Date    APPENDECTOMY       Removed at the time of hysterectomy    HYSTERECTOMY      LUNG TRANSPLANT Bilateral 04/25/2012       Review of patient's allergies indicates:  No Known Allergies  Family History     Problem Relation (Age of Onset)    Cancer Father, Brother, Maternal Uncle, Paternal Uncle    Colon cancer Father    Heart attack Paternal Aunt    Heart disease Mother    Heart failure Mother    Hypertension Father, Sister, Sister, Sister    No Known Problems Daughter, Son, Maternal Grandmother, Maternal Grandfather, Paternal Grandmother, Paternal Grandfather, Paternal Aunt, Paternal Aunt    Other Father        Social History Main Topics    Smoking status: Never Smoker    Smokeless tobacco: Never Used    Alcohol use No    Drug use: No    Sexual activity: Not Currently     Review of Systems   Constitutional: Positive for fatigue.   HENT: Negative for trouble swallowing.    Respiratory: Negative.    Cardiovascular: Negative.    Gastrointestinal: Negative.    Genitourinary: Negative.    Neurological: Negative for weakness.     Objective:     Vital Signs (Most Recent):  Temp: 98.1 °F (36.7 °C) (03/27/18 1524)  Pulse: 85 (03/27/18 1524)  Resp: 16 (03/27/18 1524)  BP: 102/68 (03/27/18 1524)  SpO2: 98 % (03/27/18 1524) Vital Signs (24h Range):  Temp:  [97.7 °F (36.5 °C)-99.1 °F (37.3 °C)] 98.1 °F (36.7 °C)  Pulse:  [] 85  Resp:  [16-18] 16  SpO2:  [93 %-100 %] 98 %  BP: ()/(52-81) 102/68     Weight: 90.4 kg (199 lb 5 oz) (03/27/18 0400)  Body mass index is 30.31 kg/m².      Intake/Output Summary (Last 24 hours) at 03/27/18 1655  Last data filed at 03/27/18 0537   Gross per 24 hour   Intake              600 ml   Output                0 ml   Net              600 ml       Lines/Drains/Airways     Central Venous Catheter Line                 Port A Cath Single Lumen 10/03/16 1200 540 days          Peripheral Intravenous Line                 Peripheral IV - Single Lumen 03/26/18 0815 1 day                Physical Exam    Constitutional: She is oriented to person, place, and time. No distress.   HENT:   Head: Normocephalic and atraumatic.   Eyes: No scleral icterus.   Neck: Normal range of motion.   Cardiovascular: Normal rate and regular rhythm.    Pulmonary/Chest: Effort normal and breath sounds normal.   Abdominal: Soft. Bowel sounds are normal.   ANYA with no external hemorrhoids and yellow stool   Musculoskeletal: She exhibits no edema.   Neurological: She is alert and oriented to person, place, and time.   Skin: She is not diaphoretic.       Significant Labs:  CBC:   Recent Labs  Lab 03/26/18  0913 03/27/18  0344   WBC 5.50 4.81   HGB 8.1* 8.2*   HCT 25.9* 26.4*   * 117*     CMP:   Recent Labs  Lab 03/27/18  0344   GLU 95   CALCIUM 9.2   ALBUMIN 3.1*   PROT 6.0   *   K 4.4   CO2 22*      BUN 23   CREATININE 1.3   ALKPHOS 121   ALT 26   AST 51*   BILITOT 2.1*     Coagulation: No results for input(s): PT, INR, APTT in the last 48 hours.    Significant Imaging:  Imaging results within the past 24 hours have been reviewed.    Assessment/Plan:     * Anemia    62 year old female  status post bilateral lung transplant (4/25/2012) 2/2 hypersensitivity pneumonitis, A2 rejection in 12/2013 and PTLD w/ vertebral lytic lesion s/p chemo in 2016 on who GI was consulted to work-up for possible GI bleed.     After speaking to patient and reviewing history she has a macrocytic anemia with no overt signs of bleeding and a normal rectal exam. In addition she has an elevated bili/LDH which would point to hemolysis but haptoglobin is normal. At this point we think her anemia is most likely related to possible B12/folate deficiency or secondary to prior chemotherapy. We do not suspect a GI bleed at this time.    Recommendations:  --Obtain B12 and folate and replace as needed   --No need for inpatient EGD/Colon            Thank you for your consult. I will sign off. Please contact us if you have any additional questions.    Marilee  ANA Jones.  Gastroenterology Fellow, PGY-IV  Pager: [phone]  [facility]

## 2018-03-27 NOTE — HOSPITAL COURSE
Lena Lynn was admitted for symptomatic anemia that was found when she presented for left arm and neck pain.  On admission, she complained of left sided neck pain and muscle tightness that she associated with sleeping wrong.  She then developed pain down her left arm that prompted ED evaluation.  Once in the ED, she had normal troponin, EKG, CXR, and plain film imaging of her left arm and shoulder.  She also endorsed some fatigue and dizziness upon standing.  Her Hgb was found to be 8.1 which shows a downward trend the past couple months.  She has had a history of lymphoma which presented similarly in the past and therefore hematology/oncology was consulted.  They recommended B12/folate testing which was ordered but unfortunately not drawn prior to blood administration.  LDH was noted to be 1200 and haptoglobin was normal.  She remained with occasional dizziness and hypotension after admission and was transfused 1 unit of PRBCs due to symptomatic anemia.  GI was consulted and did not recommend any inpatient endoscopies at this time.        She has had improvement in her symptoms and will be discharged after completion of her RBC transfusion.  Will plan to repeat labs in 48-72 hours and follow-up in clinic in 2 weeks.  She is instructed to call the answering service should she develop dizziness, hypotension, or syncope.

## 2018-03-27 NOTE — MEDICAL/APP STUDENT
[facility]  Gastroenterology  Consult Note    Patient Name: Lena Lynn  MRN: [id number]  Admission Date: 3/26/2018  Hospital Length of Stay: 0 days  Code Status: Full Code   Attending Provider: Erick Nieves MD   Consulting Provider: Johana Denny  Primary Care Physician: Erick Nieves MD  Principal Problem:Anemia    Consults  Subjective:     HPI: Lena Lynn is a 62 y.o. female  s/p bilateral lung transplant (4/25/2012) 2/2 hypersensitivity pneumonitis, A2 rejection in 12/2013  wth history of PTLD w/ vertebral lytic lesion s/p chemo, and osteoporosis who presented to the ED with left shoulder/arm pain for 2 days, hypotension and macrocytic anemia. GI was consulted to work-up for possible GI bleed.     Patient is experiencing progressive arm pain that is exacerbated with use. She has been experiencing some light headed and fatigue as well. She denies abdominal pain, hematochezia, melena or hematemesis. She has had no nausea, vomiting, fever or chills. She also denies chest pain, palpitations, SOB, dysuria, hematuria, constipation or diarrhea.    Patient states last colonoscopy was 5 years ago and denies any colonic findings. Patient denies any history of upper endoscopy being performed. She also denies any family history of colonic disease.    Patient is a non-drinker and non-smoker. She has not taken NSAIDs since her lung transplant in 2012.     Past Medical History:   Diagnosis Date    Acute rejection of lung transplant 12/12/2013    CARROLL (acute kidney injury)     Anemia 8/2/2016    Anxiety     Back pain 2016    Recently seen in ED for back pain    Cirrhosis     Depression     Hyperlipidemia     Hypertension     Lung transplant complication 1/10/2014    Lymphoma 8/10/2016    Obesity     DOROTHY (obstructive sleep apnea)     Osteoporosis     Postinflammatory pulmonary fibrosis        Past Surgical History:   Procedure Laterality Date    APPENDECTOMY      Removed  at the time of hysterectomy    HYSTERECTOMY      LUNG TRANSPLANT Bilateral 04/25/2012       Review of patient's allergies indicates:  No Known Allergies  Family History     Problem Relation (Age of Onset)    Cancer Father, Brother, Maternal Uncle, Paternal Uncle    Colon cancer Father    Heart attack Paternal Aunt    Heart disease Mother    Heart failure Mother    Hypertension Father, Sister, Sister, Sister    No Known Problems Daughter, Son, Maternal Grandmother, Maternal Grandfather, Paternal Grandmother, Paternal Grandfather, Paternal Aunt, Paternal Aunt    Other Father        Social History Main Topics    Smoking status: Never Smoker    Smokeless tobacco: Never Used    Alcohol use No    Drug use: No    Sexual activity: Not Currently     Review of Systems  Objective:     Vital Signs (Most Recent):  Temp: 97.7 °F (36.5 °C) (03/27/18 1211)  Pulse: 96 (03/27/18 1211)  Resp: 18 (03/27/18 1211)  BP: 100/68 (03/27/18 1211)  SpO2: (!) 93 % (03/27/18 1211) Vital Signs (24h Range):  Temp:  [97.7 °F (36.5 °C)-99.1 °F (37.3 °C)] 97.7 °F (36.5 °C)  Pulse:  [] 96  Resp:  [16-18] 18  SpO2:  [93 %-100 %] 93 %  BP: ()/(52-81) 100/68     Weight: 90.4 kg (199 lb 5 oz) (03/27/18 0400)  Body mass index is 30.31 kg/m².      Intake/Output Summary (Last 24 hours) at 03/27/18 1501  Last data filed at 03/27/18 0537   Gross per 24 hour   Intake              600 ml   Output                0 ml   Net              600 ml       Lines/Drains/Airways     Central Venous Catheter Line                 Port A Cath Single Lumen 10/03/16 1200 540 days          Peripheral Intravenous Line                 Peripheral IV - Single Lumen 03/26/18 0815 1 day                Physical Exam    Significant Labs:    Recent Labs  Lab 03/26/18  0913 03/27/18  0344   WBC 5.50 4.81   HGB 8.1* 8.2*   HCT 25.9* 26.4*   * 117*     BMP:   Recent Labs  Lab 03/27/18  0344   GLU 95   *   K 4.4      CO2 22*   BUN 23   CREATININE 1.3    CALCIUM 9.2   MG 1.7     CMP:   Recent Labs  Lab 03/27/18  0344   GLU 95   CALCIUM 9.2   ALBUMIN 3.1*   PROT 6.0   *   K 4.4   CO2 22*      BUN 23   CREATININE 1.3   ALKPHOS 121   ALT 26   AST 51*   BILITOT 2.1*       Recent Labs  Lab 03/26/18  0913 03/27/18  0344   ALT 23 26   AST 46* 51*   ALKPHOS 105 121   BILITOT 1.6* 2.1*   PROT 5.8* 6.0   ALBUMIN 3.1* 3.1*       Significant Imaging:  Imaging results within the past 24 hours have been reviewed.    Assessment/Plan:     Active Diagnoses:    Diagnosis Date Noted POA    PRINCIPAL PROBLEM:  Anemia [D64.9] 08/02/2016 Yes    Shoulder pain, left [M25.512] 03/26/2018 Yes    Prophylactic antibiotic [Z79.2] 03/26/2018 Not Applicable    Stage 3 chronic kidney disease [N18.3] 01/11/2017 Yes    Immunosuppression [D89.9] 09/06/2012 Yes    Lung replaced by transplant [Z94.2] 07/12/2012 Not Applicable      Problems Resolved During this Admission:    Diagnosis Date Noted Date Resolved POA       Thank you for your consult. I will sign off. Please contact us if you have any additional questions.    Johana Denny  Gastroenterology  [facility]

## 2018-03-28 NOTE — TELEPHONE ENCOUNTER
----- Message from Shavon Batres DO sent at 3/28/2018  6:32 AM CDT -----  Lena Lynn was discharged from the hospital last night.  She will need a repeat CBC in 48-72 hours post discharge and will need to follow-up in clinic in 2 weeks.    Thanks!    Shavon

## 2018-03-28 NOTE — TELEPHONE ENCOUNTER
Called patient to discuss lab appointment scheduled at [facility] lab on Thursday, 3/29/18 at 1000.  Received no answer but left a voice message asking patient to return my call so we can discuss if this date, time and location are convenient for her.

## 2018-03-28 NOTE — TELEPHONE ENCOUNTER
Patient returning call to confirm upcoming appointment.  Patient agreed to date, time and location.  Confirmed labs on 3/29/18 at [facility] and follow up clinic appt is scheduled for 4/2/19.  Patient verbalized understanding and did not have any further questions.

## 2018-04-02 PROBLEM — N17.9 AKI (ACUTE KIDNEY INJURY): Status: ACTIVE | Noted: 2018-01-01

## 2018-04-02 PROBLEM — K76.9 LIVER LESION: Status: ACTIVE | Noted: 2018-01-01

## 2018-04-02 NOTE — ASSESSMENT & PLAN NOTE
History of A2 rejection in 2013, most recent bronchoscopy on 1/23 without evidence of rejection, BAL cx wnl.      Continue current outpatient immunosuppression and ppx. Will hold Bactrim and Prograf due to CARROLL.    No

## 2018-04-02 NOTE — ASSESSMENT & PLAN NOTE
Likely multifactorial - Patient is s/p RCHOP ending in 2016, known vertebral lytic lesion and stable L4 compression fracture. Outpatient meds include tramadol 50mg and norco 5/325.     Due to patient's similar presentation of back pain upon initial diagnosis of lymphoma, will consult heme/onc team.     Continue supportive care with pain meds.    good balance

## 2018-04-02 NOTE — SUBJECTIVE & OBJECTIVE
Review of Systems   Constitutional: Positive for activity change. Negative for appetite change and chills.   HENT: Negative for congestion and sore throat.    Respiratory: Negative for cough.    Cardiovascular: Negative for chest pain and leg swelling.   Gastrointestinal: Positive for abdominal distention. Negative for abdominal pain, blood in stool, constipation and diarrhea.   Genitourinary: Positive for flank pain. Negative for dysuria and frequency.   Musculoskeletal: Positive for arthralgias and back pain.   Skin: Negative for color change and pallor.   Neurological: Positive for weakness. Negative for dizziness.   Psychiatric/Behavioral: Negative for agitation and confusion.       Past Medical History:   Diagnosis Date    Acute rejection of lung transplant 12/12/2013    CARROLL (acute kidney injury)     Anemia 8/2/2016    Anxiety     Back pain 2016    Recently seen in ED for back pain    Cirrhosis     Depression     Hyperlipidemia     Hypertension     Lung transplant complication 1/10/2014    Lymphoma 8/10/2016    Obesity     DOROTHY (obstructive sleep apnea)     Osteoporosis     Postinflammatory pulmonary fibrosis        Past Surgical History:   Procedure Laterality Date    APPENDECTOMY      Removed at the time of hysterectomy    HYSTERECTOMY      LUNG TRANSPLANT Bilateral 04/25/2012         Review of patient's allergies indicates:  No Known Allergies    Social History Main Topics    Smoking status: Never Smoker    Smokeless tobacco: Never Used    Alcohol use No    Drug use: No    Sexual activity: Not Currently       Prescriptions Prior to Admission   Medication Sig Dispense Refill Last Dose    alendronate (FOSAMAX) 70 MG tablet TAKE 1 TABLET EVERY 7 DAYS 12 tablet 3 Past Week    aspirin (ECOTRIN) 81 MG EC tablet Take 1 tablet (81 mg total) by mouth once daily. 90 tablet 3 3/25/2018    calcium carbonate (OS-JERONIMO) 600 mg (1,500 mg) Tab Take 1 tablet (600 mg total) by mouth 2 (two) times daily  with meals. 180 tablet 3 3/25/2018    citalopram (CELEXA) 20 MG tablet TAKE 1 TABLET EVERY DAY 90 tablet 3 3/25/2018    diphenhydrAMINE (BENADRYL) 25 mg capsule Take 50 mg by mouth nightly as needed.    Past Month    hydrocodone-acetaminophen 5-325mg (NORCO) 5-325 mg per tablet Take 1 tablet by mouth every 6 (six) hours as needed for Pain. 30 tablet 0 Past Week    magnesium oxide (MAG-OX) 400 mg tablet Take 1 tablet (400 mg total) by mouth 2 (two) times daily. 180 tablet 3 3/25/2018    multivitamin (MULTIVITAMIN) per tablet Take 1 tablet by mouth once daily.     3/25/2018    omeprazole (PRILOSEC) 40 MG capsule Take 1 capsule (40 mg total) by mouth once daily. 90 capsule 3 3/25/2018    predniSONE (DELTASONE) 5 MG tablet Take 1 tablet (5 mg total) by mouth once daily. 90 tablet 3 3/25/2018    sulfamethoxazole-trimethoprim 800-160mg (BACTRIM DS) 800-160 mg Tab Take 1 tablet by mouth every Mon, Wed, Fri. 36 tablet 4 3/25/2018    tacrolimus (PROGRAF) 0.5 MG Cap Take 1 capsule (0.5 mg total) by mouth every 12 (twelve) hours. 180 capsule 3     tacrolimus (PROGRAF) 1 MG Cap Take 1 capsule (1 mg total) by mouth every 12 (twelve) hours. Daily doses: 1.5 mg every 12 hours 180 capsule 3 3/25/2018    traMADol (ULTRAM) 50 mg tablet TAKE 1 TABLET EVERY 6 HOURS AS NEEDED FOR PAIN 90 tablet 0 3/25/2018    traZODone (DESYREL) 50 MG tablet Please provide 3 months supply  I pill PRN QHS Insomnia 90 tablet 1        Objective:     Vital Signs (Most Recent):  Temp: 99.5 °F (37.5 °C) (04/02/18 1526)  Pulse: 90 (04/02/18 1526)  Resp: 20 (04/02/18 1526)  BP: 107/71 (04/02/18 1526)  SpO2: 98 % (04/02/18 1526) Vital Signs (24h Range):  Temp:  [98.8 °F (37.1 °C)-99.5 °F (37.5 °C)] 99.5 °F (37.5 °C)  Pulse:  [] 90  Resp:  [20] 20  SpO2:  [95 %-100 %] 98 %  BP: ()/() 107/71     Weight: 84.9 kg (187 lb 2.7 oz) (04/02/18 1630)  Body mass index is 28.46 kg/m².    Physical Exam   Constitutional: She is oriented to  person, place, and time. She appears well-developed and well-nourished.   Appears uncomfortable   HENT:   Mouth/Throat: Oropharynx is clear and moist.   Eyes: No scleral icterus.   Cardiovascular: Normal rate and regular rhythm.    Pulmonary/Chest: Effort normal and breath sounds normal.   Abdominal: Soft. Bowel sounds are normal. There is no tenderness. There is no guarding.   Musculoskeletal: She exhibits no edema or deformity.   Low back pain   Neurological: She is alert and oriented to person, place, and time.   Skin: Skin is warm and dry.   Psychiatric: She has a normal mood and affect.   Vitals reviewed.      Computed MELD-Na score unavailable. Necessary lab results were not found in the last year.  Computed MELD score unavailable. Necessary lab results were not found in the last year.

## 2018-04-02 NOTE — ED NOTES
"Pt to ED with 10/10 uncontrolled back pain since being discharged two days ago for left arm pain of unknown origin; pt states she has experienced pain in her back similar to this when she "had that lymphoma"; hx of bilateral lung transplant     LOC:   · The pt is awake, alert, and aware of environment with an appropriate affect,  as well as speaking appropriately; AAOx3 to person, place, and situation  APPEARANCE:   · Pt in acute distress; clean and well groomed  SKIN:   · Skin is warm and dry; color consistent with ethnicity; pt has normal skin turgor and moist mucus membranes  · Skin intact w/ no breakdown  MUSCULOSKELETAL:   · Pt moving all extremities well; absent of obvious deformities   RESPIRATORY:   · Airway is open and patent; respirations are spontaneous; normal effort and rate noted; absent of accessory-muscle use  · Breath sounds auscultated in all lung fields are noted to be clear  CARDIAC:   · Pt denies chest pain; normal heart sounds auscultated; Pt has no peripheral edema noted; capillary refill < 3 seconds  · 2+ pulses palpated in all extremities   ABDOMEN:   · Soft and non-tender to palpation; no abnormal distention noted/reported; bowel sounds present x 4  NEUROLOGIC:   · PERRL, 3mm bilaterally, eyes open spontaneously; pt follows commands; facial expression symmetrical; equal hand  bilaterally; purposeful motor response noted  · Normal sensation to touch reported in distal region of all extremities    "

## 2018-04-02 NOTE — ED NOTES
Pt. Resting in bed in NAD. RR e/u. Continuous cardiac, BP, and O2 monitoring in progress. VS being monitoring continuously per MD orders. Pt. Offered bathroom assistance and denies need at this time. Pt provided meal menu. Explanation of care/wait provided. Bed in low, locked position with rails up and call bell in reach. Will continue to monitor.

## 2018-04-02 NOTE — PROGRESS NOTES
04/02/18 1526   Vital Signs   Temp 99.5 °F (37.5 °C)   Temp src Oral   Pulse 90   Resp 20   SpO2 98 %   O2 Device (Oxygen Therapy) room air   /71   MAP (mmHg) 84   Patient Position Lying   Pt arrived from ED via stretcher, accompanied by staff.  Orders noted from primary team.  VS stable  Monitor & implement orders as appropriate

## 2018-04-02 NOTE — ASSESSMENT & PLAN NOTE
63 yo F with hx of bilateral lung transplant for hypersensitivity pneumonitis and PTLD s/p RCHOP in 2006 admitted for back pain and concern for recurrence of lymphoma with new hepatic lesion.    Recs:  - given her elevated Cr, do not recommend further imaging to characterize lesion  - recommend liver biopsy for definitive diagnosis

## 2018-04-02 NOTE — H&P
[facility]  Lung Transplant  H&P    Patient Name: Lena Lynn  MRN: [id number]  Admission Date: 4/2/2018  Attending Physician: Gaetano Hernandez MD  Primary Care Provider: Erick Nieves MD     Subjective:     History of Present Illness:  Lena Lynn is a 62 y.o. female  s/p bilateral lung transplant (4/25/2012) 2/2 hypersensitivity pneumonitis, A2 rejection in 12/2013, with history of PTLD w/ vertebral lytic lesion s/p RCHOP ending in 2016, and osteoporosis who presented to the ED with complaints of back pain x2days. Patient reports low back pain, constant in duration and radiating to the right buttocks. Back pain is worsened by movements and palpation.  Patient states no alleviating factors and that current home pain meds do not provide relief. Patient denies dysuria, hematuria, urinary urgency/freuqency, abdominal pain, N/V/D/C, fevers, chills, recent sick contacts, recent trauma to the area. Patient also denies SOB, chest pain, cough.  Patient has had ongoing LIRIANO unchanged from baseline.   Patient was recently admitted for observation on 3/27 for symptomatic anemia, received 1 unit PRBCs and was discharged without complications.     ED workup revealed elevated alk phos 224, t.bili 6.5, and AST 97, BUN 35, Cr 2.0.  CT Abd/Pelvis revealed 1.8 cm hepatic lesion, new splenomegaly, and increased lymphadenopathy. Patient received IV fentanyl in addition to tramadol without relief of back pain. Heme/Onc and hepatology teams consulted.     Past Medical History:   Diagnosis Date    Acute rejection of lung transplant 12/12/2013    CARROLL (acute kidney injury)     Anemia 8/2/2016    Anxiety     Back pain 2016    Recently seen in ED for back pain    Cirrhosis     Depression     Hyperlipidemia     Hypertension     Lung transplant complication 1/10/2014    Lymphoma 8/10/2016    Obesity     DOROTHY (obstructive sleep apnea)     Osteoporosis     Postinflammatory pulmonary fibrosis         Past Surgical History:   Procedure Laterality Date    APPENDECTOMY      Removed at the time of hysterectomy    HYSTERECTOMY      LUNG TRANSPLANT Bilateral 04/25/2012       Review of patient's allergies indicates:  No Known Allergies      (Not in a hospital admission)  Family History     Problem Relation (Age of Onset)    Cancer Father, Brother, Maternal Uncle, Paternal Uncle    Colon cancer Father    Heart attack Paternal Aunt    Heart disease Mother    Heart failure Mother    Hypertension Father, Sister, Sister, Sister    No Known Problems Daughter, Son, Maternal Grandmother, Maternal Grandfather, Paternal Grandmother, Paternal Grandfather, Paternal Aunt, Paternal Aunt    Other Father        Social History Main Topics    Smoking status: Never Smoker    Smokeless tobacco: Never Used    Alcohol use No    Drug use: No    Sexual activity: Not Currently     Review of Systems   Constitutional: Positive for activity change. Negative for appetite change, chills and fever.   HENT: Negative for rhinorrhea, sinus pain and sore throat.    Eyes: Negative.    Respiratory: Positive for shortness of breath (unchanged from baseline). Negative for cough, wheezing and stridor.    Cardiovascular: Negative for chest pain and palpitations.   Gastrointestinal: Negative for abdominal pain, constipation, diarrhea, nausea and vomiting.   Genitourinary: Positive for flank pain. Negative for decreased urine volume, difficulty urinating, dysuria, frequency, hematuria, pelvic pain and urgency.   Musculoskeletal: Positive for back pain (lower). Negative for joint swelling.   Skin: Negative for color change, pallor and rash.   Allergic/Immunologic: Positive for immunocompromised state.   Neurological: Negative for dizziness, weakness, light-headedness, numbness and headaches.   Hematological: Negative.    Psychiatric/Behavioral: Negative for agitation and behavioral problems.     Objective:     Vital Signs (Most Recent):  Temp: 98.8  °F (37.1 °C) (04/02/18 0346)  Pulse: 89 (04/02/18 1007)  Resp: 20 (04/02/18 0445)  BP: (!) 94/50 (04/02/18 1002)  SpO2: 100 % (04/02/18 1002) Vital Signs (24h Range):  Temp:  [98.8 °F (37.1 °C)] 98.8 °F (37.1 °C)  Pulse:  [] 89  Resp:  [20] 20  SpO2:  [96 %-100 %] 100 %  BP: ()/() 94/50     Weight: 72.1 kg (159 lb)  Body mass index is 24.18 kg/m².    No intake or output data in the 24 hours ending 04/02/18 1014    Physical Exam   Constitutional: She is oriented to person, place, and time. She appears well-developed and well-nourished. She appears distressed.   HENT:   Head: Normocephalic and atraumatic.   Neck: Normal range of motion. Neck supple.   Cardiovascular: Regular rhythm and normal heart sounds.  Exam reveals no gallop and no friction rub.    No murmur heard.  Tachycardic     Pulmonary/Chest: Effort normal and breath sounds normal. No respiratory distress. She has no wheezes. She has no rales.   Abdominal: Soft. Bowel sounds are normal. She exhibits mass (splenomegaly noted). She exhibits no distension. There is no tenderness. There is no guarding.   Musculoskeletal: She exhibits tenderness (lower back). She exhibits no edema or deformity.   TTP along thoracic and lumbar spine.  Normal sensation. Back ROM limited secondary to pain.    Neurological: She is alert and oriented to person, place, and time.   Skin: Skin is warm. No rash noted. She is not diaphoretic. No erythema. No pallor.   Psychiatric: She has a normal mood and affect. Her behavior is normal. Judgment and thought content normal.   Nursing note and vitals reviewed.      Significant Labs:  CBC:    Recent Labs  Lab 04/02/18  0432   WBC 7.24   RBC 3.25*   HGB 9.6*   HCT 30.6*   *   MCV 94   MCH 29.5   MCHC 31.4*     BMP:    Recent Labs  Lab 04/02/18  0432   *   K 5.1   CL 99   CO2 17*   BUN 35*   CREATININE 2.0*   CALCIUM 10.0        I have reviewed all pertinent labs within the past 24 hours.    Diagnostic  Results:   \    Assessment/Plan:     * CARROLL (acute kidney injury)    Acute on stage 3 CKD. BUN 35 and Cr 2.0 upon presentation to Ed.  Maintenance fluids ordered. Will continue to monitor CMP daily.   Will continue to hold nephrotoxic meds.         Lung replaced by transplant    History of A2 rejection in 2013, most recent bronchoscopy on 1/23 without evidence of rejection, BAL cx wnl.      Continue current outpatient immunosuppression and ppx. Will hold Bactrim and Prograf due to CARROLL.         Immunosuppression    Continue outpatient regimen - tacrolimus 1.5 mg BID, prednisone 5 mg daily.      Hold Prograf due to CARROLL.  Monitor daily Prograf levels. Will adjust dose as needed upon resolution of CARROLL.         Prophylactic antibiotic    Continue outpatient regimen with bactrim DS TIW. Will hold Bactrim until CARROLL resolution.         Back pain    Likely multifactorial - Patient is s/p RCHOP ending in 2016, known vertebral lytic lesion and stable L4 compression fracture. Outpatient meds include tramadol 50mg and norco 5/325.     Due to patient's similar presentation of back pain upon initial diagnosis of lymphoma, will consult heme/onc team.     Continue supportive care with pain meds.             Tomeka Francis PA-C  Lung Transplant  [facility]

## 2018-04-02 NOTE — HPI
This is a 61 yo female s/p bilateral lung transplant for hypersensitivity pneumonitis, PTLD s/p RCHOP 2016 who presented to ED for increased back pain. Patient reports increased low back pain for the past week. She also reports shortness of breath. CT renal protocol was significant for a 1.8cm hepatic lesion. Hepatology consulted for further workup of mass. Patient has no known risk factors for liver disease and no history of liver disease.

## 2018-04-02 NOTE — CONSULTS
[facility]  Hepatology  Consult Note    Patient Name: Lena Lynn  MRN: [id number]  Admission Date: 4/2/2018  Hospital Length of Stay: 0 days  Attending Provider: Erick Nieves MD   Primary Care Physician: Erick Nieves MD  Principal Problem:CARROLL (acute kidney injury)    Inpatient consult to Hepatology  Consult performed by: KORINA HATHAWAY  Consult ordered by: VASQUEZ SCHMIDT        Subjective:     Transplant status:    HPI:  This is a 63 yo female s/p bilateral lung transplant for hypersensitivity pneumonitis, PTLD s/p RCHOP 2016 who presented to ED for increased back pain. Patient reports increased low back pain for the past week. She also reports shortness of breath. CT renal protocol was significant for a 1.8cm hepatic lesion. Hepatology consulted for further workup of mass. Patient has no known risk factors for liver disease and no history of liver disease.     Review of Systems   Constitutional: Positive for activity change. Negative for appetite change and chills.   HENT: Negative for congestion and sore throat.    Respiratory: Negative for cough.    Cardiovascular: Negative for chest pain and leg swelling.   Gastrointestinal: Positive for abdominal distention. Negative for abdominal pain, blood in stool, constipation and diarrhea.   Genitourinary: Positive for flank pain. Negative for dysuria and frequency.   Musculoskeletal: Positive for arthralgias and back pain.   Skin: Negative for color change and pallor.   Neurological: Positive for weakness. Negative for dizziness.   Psychiatric/Behavioral: Negative for agitation and confusion.       Past Medical History:   Diagnosis Date    Acute rejection of lung transplant 12/12/2013    CARROLL (acute kidney injury)     Anemia 8/2/2016    Anxiety     Back pain 2016    Recently seen in ED for back pain    Cirrhosis     Depression     Hyperlipidemia     Hypertension     Lung transplant complication 1/10/2014    Lymphoma  8/10/2016    Obesity     DOROTHY (obstructive sleep apnea)     Osteoporosis     Postinflammatory pulmonary fibrosis        Past Surgical History:   Procedure Laterality Date    APPENDECTOMY      Removed at the time of hysterectomy    HYSTERECTOMY      LUNG TRANSPLANT Bilateral 04/25/2012         Review of patient's allergies indicates:  No Known Allergies    Social History Main Topics    Smoking status: Never Smoker    Smokeless tobacco: Never Used    Alcohol use No    Drug use: No    Sexual activity: Not Currently       Prescriptions Prior to Admission   Medication Sig Dispense Refill Last Dose    alendronate (FOSAMAX) 70 MG tablet TAKE 1 TABLET EVERY 7 DAYS 12 tablet 3 Past Week    aspirin (ECOTRIN) 81 MG EC tablet Take 1 tablet (81 mg total) by mouth once daily. 90 tablet 3 3/25/2018    calcium carbonate (OS-JERONIMO) 600 mg (1,500 mg) Tab Take 1 tablet (600 mg total) by mouth 2 (two) times daily with meals. 180 tablet 3 3/25/2018    citalopram (CELEXA) 20 MG tablet TAKE 1 TABLET EVERY DAY 90 tablet 3 3/25/2018    diphenhydrAMINE (BENADRYL) 25 mg capsule Take 50 mg by mouth nightly as needed.    Past Month    hydrocodone-acetaminophen 5-325mg (NORCO) 5-325 mg per tablet Take 1 tablet by mouth every 6 (six) hours as needed for Pain. 30 tablet 0 Past Week    magnesium oxide (MAG-OX) 400 mg tablet Take 1 tablet (400 mg total) by mouth 2 (two) times daily. 180 tablet 3 3/25/2018    multivitamin (MULTIVITAMIN) per tablet Take 1 tablet by mouth once daily.     3/25/2018    omeprazole (PRILOSEC) 40 MG capsule Take 1 capsule (40 mg total) by mouth once daily. 90 capsule 3 3/25/2018    predniSONE (DELTASONE) 5 MG tablet Take 1 tablet (5 mg total) by mouth once daily. 90 tablet 3 3/25/2018    sulfamethoxazole-trimethoprim 800-160mg (BACTRIM DS) 800-160 mg Tab Take 1 tablet by mouth every Mon, Wed, Fri. 36 tablet 4 3/25/2018    tacrolimus (PROGRAF) 0.5 MG Cap Take 1 capsule (0.5 mg total) by mouth every 12  (twelve) hours. 180 capsule 3     tacrolimus (PROGRAF) 1 MG Cap Take 1 capsule (1 mg total) by mouth every 12 (twelve) hours. Daily doses: 1.5 mg every 12 hours 180 capsule 3 3/25/2018    traMADol (ULTRAM) 50 mg tablet TAKE 1 TABLET EVERY 6 HOURS AS NEEDED FOR PAIN 90 tablet 0 3/25/2018    traZODone (DESYREL) 50 MG tablet Please provide 3 months supply  I pill PRN QHS Insomnia 90 tablet 1        Objective:     Vital Signs (Most Recent):  Temp: 99.5 °F (37.5 °C) (04/02/18 1526)  Pulse: 90 (04/02/18 1526)  Resp: 20 (04/02/18 1526)  BP: 107/71 (04/02/18 1526)  SpO2: 98 % (04/02/18 1526) Vital Signs (24h Range):  Temp:  [98.8 °F (37.1 °C)-99.5 °F (37.5 °C)] 99.5 °F (37.5 °C)  Pulse:  [] 90  Resp:  [20] 20  SpO2:  [95 %-100 %] 98 %  BP: ()/() 107/71     Weight: 84.9 kg (187 lb 2.7 oz) (04/02/18 1630)  Body mass index is 28.46 kg/m².    Physical Exam   Constitutional: She is oriented to person, place, and time. She appears well-developed and well-nourished.   Appears uncomfortable   HENT:   Mouth/Throat: Oropharynx is clear and moist.   Eyes: No scleral icterus.   Cardiovascular: Normal rate and regular rhythm.    Pulmonary/Chest: Effort normal and breath sounds normal.   Abdominal: Soft. Bowel sounds are normal. There is no tenderness. There is no guarding.   Musculoskeletal: She exhibits no edema or deformity.   Low back pain   Neurological: She is alert and oriented to person, place, and time.   Skin: Skin is warm and dry.   Psychiatric: She has a normal mood and affect.   Vitals reviewed.      Computed MELD-Na score unavailable. Necessary lab results were not found in the last year.  Computed MELD score unavailable. Necessary lab results were not found in the last year.      Assessment/Plan:     Liver lesion    61 yo F with hx of bilateral lung transplant for hypersensitivity pneumonitis and PTLD s/p RCHOP in 2006 admitted for back pain and concern for recurrence of lymphoma with new hepatic  lesion.    Recs:  - given her elevated Cr, do not recommend further imaging to characterize lesion  - recommend liver biopsy for definitive diagnosis             Thank you for your consult.    Sd Hunt MD  Hepatology  [facility]

## 2018-04-02 NOTE — PLAN OF CARE
Problem: Patient Care Overview  Goal: Plan of Care Review  Outcome: Ongoing (interventions implemented as appropriate)  - Renal CT done in ED.  See MD note  - Elitek ordered for increased serum uric acid - administer when available from pharmacy  - Hypotension in ED, stable since transfer, monitor  - NS IVF stopped per order.  Encourage nutrition & appropriate fluid intake  - Norco working well for pain since length of action is longer.  Monitor  - Prograf on hold for renal dysfunction, trend level

## 2018-04-02 NOTE — CONSULTS
[facility]  Hematology/Oncology  Consult Note    Patient Name: Lena Lynn  MRN: [id number]  Admission Date: 4/2/2018  Hospital Length of Stay: 0 days  Code Status: Full Code   Attending Provider: Gaetano Hernandez MD  Consulting Provider: Kristina Spears MD  Primary Care Physician: Erick Nieves MD  Principal Problem:<principal problem not specified>    Inpatient consult to Hematology/Oncology  Consult performed by: KRISTINA SPEARS  Consult ordered by: VASQUEZ SCHMIDT        Subjective:     HPI: 60-year-old status post bilateral sequential lung transplant CMV Status: D+ / R+ (in 2012 2/2 hypersensitivity pneumonitis, complicated by A2 rejection in 2013, on chronic pred/tacro), PTLPD (DLBCL) diagnosed 8/12/16 s/p 6 cycles of RCHOP now and in remission since December, 2016. Admitted today for evaluation of severe back pain which started few days ago. Not controlled with current pain regimen. Lab work showed anemia with Hb: 9.6 g/dL with Plt 128,000/mm3, elevated BUN/Cr, and hyperbilirubinemia. CT of Abdomen for Renal Stone showed hepatic lesions and splenomegaly. Hematology consulted for evaluation for possible replase disease. She was seen by Dr. Campa, her primary Hematologist on 1/28/18, patient was in complete remission by labs and physical examination. LDH was 226 U/L, CBC: WBC: 4,001/mm3, Hb: 12.3 g/dL, MCV: 101, and Plt: 140,000/mm3 in January, 2018.     Hematology Hx:  She presented in August, 2016 with worsening back pain x 3 months, found to have a compression fracture at L4 and several lytic lesions in the left sacrum, and also T8, consistent with probable malignancy per CT chest abdomen and pelvis without contrast on 8/3/16. S/p bx of T8 lesion -> DLBCL. Transferred to BMT service for treatment. Course complicated by CARROLL likely secondary to tumor lysis syndrome. She was transferred to ICU with volume overload and fever meeting severe sepsis criteria. Patient was placed on  broad spectrum antibiotics. Received rasburicase and was placed on allopurinol. Kidney function improved. Patient was given R-CHOP 8/14-8/15. Bone marrow biopsy and LP with IT MTX were performed 8/12/16. CSF does not appear have involvement with lymphoma and bone marrow biopsy results showed no involvement. Completed 6 cycles of RCHOP chemotherapy. PET imaging in December 2016 at completion of therapy had NEYMAR. She was seen by Dr. Campa, her primary Hematologist on 1/28/18, patient was in complete remission by labs and physical examination. LDH was 226 U/L, CBC: WBC: 4,001/mm3, Hb: 12.3 g/dL, MCV: 101, and Plt: 140,000/mm3 in January, 2018.     FINAL PATHOLOGIC DIAGNOSIS  8/8/16:    1. T8 LYTIC BONE , BIOPSY- DIFFUSE LARGE B-CELL LYMPHOMA, NON-GERMINAL CENTER ORIGIN.     COMMENT: Fragments of blood clot with large sheets of large atypical lymphocytes.  Flow cytometric analysis of tissue was not submitted  Immunohistochemical studies were performed paraffin block with adequate positive and negative controls . The  large atypical lymphocytes are positive for MuM-1, CD20, BCL-2, BC L-6(focal), high Ki-67 (>95%) and are negative for CD10, , and cyclinD1. Reactive T cells are CD3 positive, CD5 positive, and BCL-2 positive.    Findings are most consistent with diffuse large B-cell lymphoma, non-Germinal Center origin. In situ hybridization  for EBV is negative. 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    FISH studies: Normal result for the MYC gene region.    Medications:  Continuous Infusions:   sodium chloride 0.9%       Scheduled Meds:   aspirin  81 mg Oral Daily    citalopram  20 mg Oral Daily    enoxaparin  40 mg Subcutaneous Daily    magnesium oxide  400 mg Oral BID    pantoprazole  40 mg Oral Daily    predniSONE  5 mg Oral Daily    prochlorperazine  10 mg Intravenous Once      PRN Meds:acetaminophen, diphenhydrAMINE, hydrocodone-acetaminophen 5-325mg, potassium chloride 10% **AND** potassium chloride 10% **AND** potassium chloride 10%, traMADol, traZODone     Review of patient's allergies indicates:  No Known Allergies     Past Medical History:   Diagnosis Date    Acute rejection of lung transplant 12/12/2013    CARROLL (acute kidney injury)     Anemia 8/2/2016    Anxiety     Back pain 2016    Recently seen in ED for back pain    Cirrhosis     Depression     Hyperlipidemia     Hypertension     Lung transplant complication 1/10/2014    Lymphoma 8/10/2016    Obesity     DOROTHY (obstructive sleep apnea)     Osteoporosis     Postinflammatory pulmonary fibrosis      Past Surgical History:   Procedure Laterality Date    APPENDECTOMY      Removed at the time of hysterectomy    HYSTERECTOMY      LUNG TRANSPLANT Bilateral 04/25/2012     Family History     Problem Relation (Age of Onset)    Cancer Father, Brother, Maternal Uncle, Paternal Uncle    Colon cancer Father    Heart attack Paternal Aunt    Heart disease Mother    Heart failure Mother    Hypertension Father, Sister, Sister, Sister    No Known Problems Daughter, Son, Maternal Grandmother, Maternal Grandfather, Paternal Grandmother, Paternal Grandfather, Paternal Aunt, Paternal Aunt    Other Father        Social History Main Topics    Smoking status: Never Smoker    Smokeless tobacco: Never Used    Alcohol use No    Drug use: No    Sexual activity: Not Currently       Review of Systems   Constitutional: Positive for activity change. Negative for appetite change, chills and fever.   HENT: Negative for rhinorrhea, sinus pain and sore throat.    Eyes: Negative.    Respiratory: Positive for shortness of breath. Negative for cough, wheezing and stridor.    Cardiovascular: Negative for chest pain and palpitations.   Gastrointestinal: Negative for abdominal pain, constipation, diarrhea, nausea and vomiting.   Genitourinary:  Positive for flank pain. Negative for decreased urine volume, difficulty urinating, dysuria, frequency, hematuria, pelvic pain and urgency.   Musculoskeletal: Positive for back pain (lower). Negative for joint swelling.   Skin: Negative for color change, pallor and rash.   Allergic/Immunologic: Positive for immunocompromised state.   Neurological: Negative for dizziness, weakness, light-headedness, numbness and headaches.   Hematological: Negative.    Psychiatric/Behavioral: Negative for agitation and behavioral problems.     Objective:     Vital Signs (Most Recent):  Temp: 98.8 °F (37.1 °C) (04/02/18 0346)  Pulse: 89 (04/02/18 1007)  Resp: 20 (04/02/18 0445)  BP: (!) 94/50 (04/02/18 1002)  SpO2: 100 % (04/02/18 1002) Vital Signs (24h Range):  Temp:  [98.8 °F (37.1 °C)] 98.8 °F (37.1 °C)  Pulse:  [] 89  Resp:  [20] 20  SpO2:  [96 %-100 %] 100 %  BP: ()/() 94/50     Weight: 72.1 kg (159 lb)  Body mass index is 24.18 kg/m².  Body surface area is 1.86 meters squared.    No intake or output data in the 24 hours ending 04/02/18 1021    Physical Exam  Constitutional: She is oriented to person, place, and time. She appears well-developed and well-nourished. She appears distressed.   HENT:   Head: Normocephalic and atraumatic.   Neck: Normal range of motion. Neck supple.   Cardiovascular: Regular rhythm and normal heart sounds.  Exam reveals no gallop and no friction rub.    Tachycardic   Pulmonary/Chest: Effort normal and breath sounds normal. No respiratory distress. She has no wheezes. She has no rales.   Abdominal: Soft. Bowel sounds are normal. She exhibits splenomegaly. She exhibits no distension. There is no tenderness. There is no guarding.   Musculoskeletal: She exhibits tenderness (lower back). She exhibits no edema or deformity.   TTP along thoracic and lumbar spine.  Normal sensation. Back ROM limited secondary to pain.    Neurological: She is alert and oriented to person, place, and time.    Skin: Skin is warm. No rash noted. She is not diaphoretic. No erythema. No pallor.   Psychiatric: She has a normal mood and affect. Her behavior is normal. Judgment and thought content normal.     Significant Labs:   CBC:   Recent Labs  Lab 04/02/18  0432   WBC 7.24   HGB 9.6*   HCT 30.6*   *    and CMP:   Recent Labs  Lab 04/02/18  0432   *   K 5.1   CL 99   CO2 17*   GLU 82   BUN 35*   CREATININE 2.0*   CALCIUM 10.0   PROT 6.5   ALBUMIN 3.2*   BILITOT 6.5*   ALKPHOS 224*   AST 97*   ALT 28   ANIONGAP 16   EGFRNONAA 26.4*       Diagnostic Results:    CT Renal Stone Study ABD Pelvis WO    There is a 0.3 cm micronodule within the right upper lobe, not definitely present on CT dated 08/03/2016.    Evaluation of the intra-abdominal/pelvic structures is limited secondary to the lack of intravenous and enteric contrast.    The liver is normal in size.  There has been interval development of a discrete hypoattenuating lesion within hepatic segment III measuring 1.8 cm in long axis.  Remaining hepatic parenchyma demonstrates heterogeneous enhancement with suspected additional ill-defined hypoattenuating lesions. No intrahepatic bile duct dilatation.    The spleen is enlarged.  No focal splenic abnormalities.    There is upper abdominal lymphadenopathy with index celiac axis node measuring 1.2 cm in short axis (series 2, image 29).    No focal mesenteric masses.    There is stable vertebral plana of the T8 vertebral body, unchanged when compared to radiographs dated 03/12/2018.  There is a stable compression deformity of the L4 vertebral body.  No new fractures.     Assessment/Plan:     Active Diagnoses:    Diagnosis Date Noted POA    CARROLL (acute kidney injury) [N17.9] 04/02/2018 Yes      Problems Resolved During this Admission:    Diagnosis Date Noted Date Resolved POA     Hx of PTLD/DLBCL  Abdominal Lymphoadenopathy/Splenomegaly   Hepatic Lesion/Hyperbilirubinemia  Bicytopenia  CARROLL/?TLS    - s/p 6 cycles of  R-CHOP in 2016 with post-treatment PET showing NEYMAR.   - given cytopenias, elevated LDH, and abnormal CT findings, concern for relapse disease.  - no B-symptoms.    - uric acid 11.9 mg/dL today and concurrent renal insufficiency, concern for TLS. will give Rasburicase 6 mg today. Consider gentle hydration as tolerated.   - monitor electrolytes and uric acid closely.     - obtain CT Chest/Abdomen/Pelvis with contrast permitting improvement in renal insufficiency in the next 1-2 days, otherwise, can be done w/o contrast.   - will need tissue diagnosis for confirmation for relapse, consider IR guided biopsy of the liver lesion.   - further diagnostic and therapeutic management will depend on final pathology.     Case discussed with staff, Dr. Zina Daniels.       Thank you for your consult. I will follow-up with patient. Please contact us if you have any additional questions.    Piotr Spears MD  Hematology/Oncology  [facility]

## 2018-04-02 NOTE — ASSESSMENT & PLAN NOTE
Acute on stage 3 CKD. BUN 35 and Cr 2.0 upon presentation to Ed.  Maintenance fluids ordered. Will continue to monitor CMP daily.   Will continue to hold nephrotoxic meds.

## 2018-04-02 NOTE — ED NOTES
Pt. grimacing/moaning in bed. Pt given medication for pain management. RR e/u. Continuous cardiac, BP, and O2 monitoring in progress. VS being monitoring continuously per MD orders. Pt. Offered bathroom assistance and denies need at this time. Explanation of care/wait provided. Bed in low, locked position with rails up and call bell in reach. Will continue to monitor.

## 2018-04-02 NOTE — HPI
Lena Lynn is a 62 y.o. female  s/p bilateral lung transplant (4/25/2012) 2/2 hypersensitivity pneumonitis, A2 rejection in 12/2013, with history of PTLD w/ vertebral lytic lesion s/p RCHOP ending in 2016, and osteoporosis who presented to the ED with complaints of back pain x2days. Patient reports low back pain, constant in duration and radiating to the right buttocks. Back pain is worsened by movements and palpation.  Patient states no alleviating factors and that current home pain meds do not provide relief. Patient denies dysuria, hematuria, urinary urgency/freuqency, abdominal pain, N/V/D/C, fevers, chills, recent sick contacts, recent trauma to the area. Patient also denies SOB, chest pain, cough.  Patient has had ongoing LIRIANO unchanged from baseline.   Patient was recently admitted for observation on 3/27 for symptomatic anemia, received 1 unit PRBCs and was discharged without complications.     ED workup revealed elevated alk phos 224, t.bili 6.5, and AST 97, BUN 35, Cr 2.0.  CT Abd/Pelvis revealed 1.8 cm hepatic lesion, new splenomegaly, and increased lymphadenopathy. Patient received IV fentanyl in addition to tramadol without relief of back pain. Heme/Onc and hepatology teams consulted.

## 2018-04-02 NOTE — ED PROVIDER NOTES
Encounter Date: 4/2/2018    SCRIBE #1 NOTE: I, Kesha Tran, am scribing for, and in the presence of, Dr. Gaetano Hernandez.       History     Chief Complaint   Patient presents with    Back Pain     pt c/o lower back pain that started tonight, pt had double lung transplant in 2012, pt reports that she felt this pain before and was diagnosed with lymphoma cancer, pt reports that pain is not controlled by pain pills      Time seen by provider: 4:25 AM    This is a 62 y.o. female with medical conditions including lung transplant, HLD, HTN, back pain, cirrhosis who presents with complaint of severe back pain. Her back pain has been worsening over a past few days but is significantly worse tonight. She states this pain is similar to the pain she was having at the time she was diagnosed with lymphoma, the pain is aching in quality. Her pain is no controlled by her at home pain medication (Tramadol). The patient denies any abdominal pain.        The history is provided by the patient.     Review of patient's allergies indicates:  No Known Allergies  Past Medical History:   Diagnosis Date    Acute rejection of lung transplant 12/12/2013    CARROLL (acute kidney injury)     Anemia 8/2/2016    Anxiety     Back pain 2016    Recently seen in ED for back pain    Cirrhosis     Depression     Hyperlipidemia     Hypertension     Lung transplant complication 1/10/2014    Lymphoma 8/10/2016    Obesity     DOROTHY (obstructive sleep apnea)     Osteoporosis     Postinflammatory pulmonary fibrosis      Past Surgical History:   Procedure Laterality Date    APPENDECTOMY      Removed at the time of hysterectomy    HYSTERECTOMY      LUNG TRANSPLANT Bilateral 04/25/2012     Family History   Problem Relation Age of Onset    Heart failure Mother     Heart disease Mother     Hypertension Father     Other Father      Complications from surgery of the colon    Cancer Father     Colon cancer Father     Hypertension Sister      Cancer Brother     No Known Problems Daughter     No Known Problems Son     Cancer Maternal Uncle     Heart attack Paternal Aunt     Cancer Paternal Uncle     No Known Problems Maternal Grandmother     No Known Problems Maternal Grandfather     No Known Problems Paternal Grandmother     No Known Problems Paternal Grandfather     Hypertension Sister     Hypertension Sister     No Known Problems Paternal Aunt     No Known Problems Paternal Aunt      Social History   Substance Use Topics    Smoking status: Never Smoker    Smokeless tobacco: Never Used    Alcohol use No     Review of Systems   Constitutional: Negative for chills and fever.   HENT: Negative for facial swelling.    Eyes: Negative for visual disturbance.   Respiratory: Negative for shortness of breath.    Cardiovascular: Negative for chest pain.   Gastrointestinal: Positive for abdominal pain.   Genitourinary: Negative for difficulty urinating.   Musculoskeletal: Positive for back pain.   Skin: Negative for rash.   Neurological: Negative for speech difficulty.       Physical Exam     Initial Vitals [04/02/18 0346]   BP Pulse Resp Temp SpO2   (!) 160/106 105 20 98.8 °F (37.1 °C) 97 %      MAP       124         Physical Exam    Nursing note and vitals reviewed.  Constitutional: She appears well-developed and well-nourished.   Appears uncomfortable    HENT:   Head: Normocephalic and atraumatic.   Eyes: EOM are normal. Pupils are equal, round, and reactive to light.   Neck: Normal range of motion. Neck supple.   Cardiovascular: Normal rate, regular rhythm and normal heart sounds.   Pulmonary/Chest: Breath sounds normal. No respiratory distress.   Abdominal: Soft. There is tenderness. There is CVA tenderness (left).         ED Course   Procedures  Labs Reviewed   CBC W/ AUTO DIFFERENTIAL - Abnormal; Notable for the following:        Result Value    RBC 3.25 (*)     Hemoglobin 9.6 (*)     Hematocrit 30.6 (*)     MCHC 31.4 (*)     RDW 18.5 (*)      Platelets 128 (*)     Immature Granulocytes 1.1 (*)     Immature Grans (Abs) 0.08 (*)     Gran% 76.1 (*)     Lymph% 15.7 (*)     All other components within normal limits   COMPREHENSIVE METABOLIC PANEL - Abnormal; Notable for the following:     Sodium 132 (*)     CO2 17 (*)     BUN, Bld 35 (*)     Creatinine 2.0 (*)     Albumin 3.2 (*)     Total Bilirubin 6.5 (*)     Alkaline Phosphatase 224 (*)     AST 97 (*)     eGFR if  30.4 (*)     eGFR if non  26.4 (*)     All other components within normal limits   URINALYSIS, REFLEX TO URINE CULTURE             Medical Decision Making:   History:   Old Medical Records: I decided to obtain old medical records.  Clinical Tests:   Lab Tests: Ordered and Reviewed  Radiological Study: Ordered and Reviewed  ED Management:  Patient with back pian concerning for possible kidney stone vs dissection vs solid organ injury. Labs notable for elevated creatinine, bilirubin, and alk phos. Hemoglobin is at baseline. CT ab/pel shows a new lesion on the liver and markedly enlarged spleen. No other obvious causes for pain. Given her worsening kidney and liver labs and these ne findings, patient will be admitted for further care. Lung transplant team paged at 5:45 AM.     Update:  Spoke briefly with Dr. Nieves who will admit for further care.  Patient given several doses of fentanyl for pain with mild improvement. BP continues to improve.            Scribe Attestation:   Scribe #1: I performed the above scribed service and the documentation accurately describes the services I performed. I attest to the accuracy of the note.               Clinical Impression:   There were no encounter diagnoses.    Disposition:   Disposition: Admitted                        Gaetano Hernandez MD  04/02/18 0796

## 2018-04-02 NOTE — SUBJECTIVE & OBJECTIVE
Past Medical History:   Diagnosis Date    Acute rejection of lung transplant 12/12/2013    CARROLL (acute kidney injury)     Anemia 8/2/2016    Anxiety     Back pain 2016    Recently seen in ED for back pain    Cirrhosis     Depression     Hyperlipidemia     Hypertension     Lung transplant complication 1/10/2014    Lymphoma 8/10/2016    Obesity     DOROTHY (obstructive sleep apnea)     Osteoporosis     Postinflammatory pulmonary fibrosis        Past Surgical History:   Procedure Laterality Date    APPENDECTOMY      Removed at the time of hysterectomy    HYSTERECTOMY      LUNG TRANSPLANT Bilateral 04/25/2012       Review of patient's allergies indicates:  No Known Allergies      (Not in a hospital admission)  Family History     Problem Relation (Age of Onset)    Cancer Father, Brother, Maternal Uncle, Paternal Uncle    Colon cancer Father    Heart attack Paternal Aunt    Heart disease Mother    Heart failure Mother    Hypertension Father, Sister, Sister, Sister    No Known Problems Daughter, Son, Maternal Grandmother, Maternal Grandfather, Paternal Grandmother, Paternal Grandfather, Paternal Aunt, Paternal Aunt    Other Father        Social History Main Topics    Smoking status: Never Smoker    Smokeless tobacco: Never Used    Alcohol use No    Drug use: No    Sexual activity: Not Currently     Review of Systems   Constitutional: Positive for activity change. Negative for appetite change, chills and fever.   HENT: Negative for rhinorrhea, sinus pain and sore throat.    Eyes: Negative.    Respiratory: Positive for shortness of breath (unchanged from baseline). Negative for cough, wheezing and stridor.    Cardiovascular: Negative for chest pain and palpitations.   Gastrointestinal: Negative for abdominal pain, constipation, diarrhea, nausea and vomiting.   Genitourinary: Positive for flank pain. Negative for decreased urine volume, difficulty urinating, dysuria, frequency, hematuria, pelvic pain and  urgency.   Musculoskeletal: Positive for back pain (lower). Negative for joint swelling.   Skin: Negative for color change, pallor and rash.   Allergic/Immunologic: Positive for immunocompromised state.   Neurological: Negative for dizziness, weakness, light-headedness, numbness and headaches.   Hematological: Negative.    Psychiatric/Behavioral: Negative for agitation and behavioral problems.     Objective:     Vital Signs (Most Recent):  Temp: 98.8 °F (37.1 °C) (04/02/18 0346)  Pulse: 89 (04/02/18 1007)  Resp: 20 (04/02/18 0445)  BP: (!) 94/50 (04/02/18 1002)  SpO2: 100 % (04/02/18 1002) Vital Signs (24h Range):  Temp:  [98.8 °F (37.1 °C)] 98.8 °F (37.1 °C)  Pulse:  [] 89  Resp:  [20] 20  SpO2:  [96 %-100 %] 100 %  BP: ()/() 94/50     Weight: 72.1 kg (159 lb)  Body mass index is 24.18 kg/m².    No intake or output data in the 24 hours ending 04/02/18 1014    Physical Exam   Constitutional: She is oriented to person, place, and time. She appears well-developed and well-nourished. She appears distressed.   HENT:   Head: Normocephalic and atraumatic.   Neck: Normal range of motion. Neck supple.   Cardiovascular: Regular rhythm and normal heart sounds.  Exam reveals no gallop and no friction rub.    No murmur heard.  Tachycardic     Pulmonary/Chest: Effort normal and breath sounds normal. No respiratory distress. She has no wheezes. She has no rales.   Abdominal: Soft. Bowel sounds are normal. She exhibits mass (splenomegaly noted). She exhibits no distension. There is no tenderness. There is no guarding.   Musculoskeletal: She exhibits tenderness (lower back). She exhibits no edema or deformity.   TTP along thoracic and lumbar spine.  Normal sensation. Back ROM limited secondary to pain.    Neurological: She is alert and oriented to person, place, and time.   Skin: Skin is warm. No rash noted. She is not diaphoretic. No erythema. No pallor.   Psychiatric: She has a normal mood and affect. Her  behavior is normal. Judgment and thought content normal.   Nursing note and vitals reviewed.      Significant Labs:  CBC:    Recent Labs  Lab 04/02/18  0432   WBC 7.24   RBC 3.25*   HGB 9.6*   HCT 30.6*   *   MCV 94   MCH 29.5   MCHC 31.4*     BMP:    Recent Labs  Lab 04/02/18  0432   *   K 5.1   CL 99   CO2 17*   BUN 35*   CREATININE 2.0*   CALCIUM 10.0        I have reviewed all pertinent labs within the past 24 hours.    Diagnostic Results:   \

## 2018-04-02 NOTE — ASSESSMENT & PLAN NOTE
Continue outpatient regimen - tacrolimus 1.5 mg BID, prednisone 5 mg daily.      Hold Prograf due to CARROLL.  Monitor daily Prograf levels. Will adjust dose as needed upon resolution of CARROLL.

## 2018-04-03 NOTE — ASSESSMENT & PLAN NOTE
History of A2 rejection in 2013, most recent bronchoscopy on 1/23 without evidence of rejection, BAL cx wnl.      Continue current outpatient immunosuppression and ppx. Will hold Bactrim and Prograf due to CARROLL.

## 2018-04-03 NOTE — PROGRESS NOTES
Patient seen with Dr. Nieves, Dr. Batres, Tomeka Francis PA-C, Ekaterina De La Cruz PA-C, and Julio Cesar Verma, PharmD.  Patient denied feeling SOB despite hypoxemia noted per pulse oximetry.  Dr. Nieves explained that he, oncology and hepatology are concerned that lymphoma has recurred based on recent test results.  The patient verbalized her awareness of this possibility.  Plan of care reviewed with her as follows:  1.  IR consult for liver biopsy of hepatic lesion  2.  Urine tests to further evaluate CARROLL  3.  Continued collaboration with oncology regarding the plan of care.  The patient denied having any questions and verbalized her understanding of all information discussed.  Transplant coordinator will continue to follow with the multi-disciplinary team, and remains available to assist with patient/family care and education.

## 2018-04-03 NOTE — PLAN OF CARE
Problem: Patient Care Overview  Goal: Plan of Care Review  Pt oriented but flat affect abd delayed responses. Bed in low position and callbell within reach. Family members at bedside. vswnl with temp 99.5 but pt under 5 blankets. Pain med given and pt sleepy but easily arousable.. Pt ambulates with 1 assist and turns independently. Bedside commode placed at bed. Standard precautions maintained. Cr 2.0 in am up from 1.3 elitek ivpb given.

## 2018-04-03 NOTE — ASSESSMENT & PLAN NOTE
Likely multifactorial - patient with history of PTLD s/p RCHOP ending in 2016, likely component of hemodilution with IVF administration yesterday. However, will determine etiology tumor lysis syndrome vs hemolytic anemia vs anemia of chronic disease.

## 2018-04-03 NOTE — ASSESSMENT & PLAN NOTE
Contributing Nutrition Diagnosis  Inadequate energy intake    Related to (etiology):   Poor appetite    Signs and Symptoms (as evidenced by):   Consuming <50% of meals x 1 week    Interventions/Recommendations (treatment strategy):  See recs    Nutrition Diagnosis Status:   New

## 2018-04-03 NOTE — ASSESSMENT & PLAN NOTE
Likely multifactorial - Patient is s/p RCHOP ending in 2016, known vertebral lytic lesion and stable L4 compression fracture. Outpatient meds include tramadol 50mg and norco 5/325.     Heme/onc following, suspicious for lymphoma recurrence.     Continue supportive care with pain meds.

## 2018-04-03 NOTE — CONSULTS
"Interventional Radiology Consult:    Pt with h/o lung transplant and lymphoma with concern for recurrence and tumor lysis.  IR consulted for liver mass biopsy.  Case discussed with Mavis Caal and Harjinder (IR staff) and Dr. Jacobo (US staff).  US guided biopsy is recommended.  However, aspirin must be held x 5 days prior to biopsy.    Plan:  1. Hold ASA x 5 days  2. Obtain RUQ US (include in comments "Eval hepatic masses for biopsy").  3. Place order for US Liver biopsy.  4. Obtain INR.  5. NPO at midnight once biopsy scheduled.      Irene Bauer MD  Resident  Department of Radiology  Pager: [phone]    "

## 2018-04-03 NOTE — PROGRESS NOTES
[facility]  Lung Transplant  Progress Note - Floor    Patient Name: Lena Lynn  MRN: [id number]  Admission Date: 4/2/2018  Hospital Length of Stay: 1 days  Post-Operative Day: 2169  Attending Physician: Erick Nieves MD  Primary Care Provider: Erick Nieves MD     Subjective:     Interval History: Patient with worsening hypoxia overnight.  Patient received 1000 cc bolus in ED prior to maintenance fluids on the floor. CXR shows evidence of pulmonary edema this AM.  Patient was put on 4L NC overnight for worsening O2. Patient denies SOB, chest pain, cough, lightheadedness, dizziness.  Currently 90% on 4L NC. Patient continues to complain of non-localized lumbar back pain.   Heme/Onc following for possible recurrent lymphoma vs. Tumor lysis syndrome.     Continuous Infusions:  Scheduled Meds:   aspirin  81 mg Oral Daily    citalopram  20 mg Oral Daily    enoxaparin  30 mg Subcutaneous Daily    magnesium oxide  400 mg Oral BID    pantoprazole  40 mg Oral Daily    predniSONE  5 mg Oral Daily    prochlorperazine  10 mg Intravenous Once     PRN Meds:acetaminophen, diphenhydrAMINE, hydrocodone-acetaminophen 5-325mg, potassium chloride 10% **AND** potassium chloride 10% **AND** potassium chloride 10%, traMADol, traZODone    Review of patient's allergies indicates:  No Known Allergies    Review of Systems   Constitutional: Positive for activity change. Negative for appetite change, chills and fever.   HENT: Negative for rhinorrhea, sinus pain and sore throat.    Eyes: Negative.    Respiratory: Positive for shortness of breath (unchanged from baseline). Negative for cough, wheezing and stridor.    Cardiovascular: Negative for chest pain and palpitations.   Gastrointestinal: Negative for abdominal pain, constipation, diarrhea, nausea and vomiting.   Genitourinary: Positive for flank pain. Negative for decreased urine volume, difficulty urinating, dysuria, frequency, hematuria, pelvic  pain and urgency.   Musculoskeletal: Positive for back pain (lower). Negative for joint swelling.   Skin: Negative for color change, pallor and rash.   Allergic/Immunologic: Positive for immunocompromised state.   Neurological: Negative for dizziness, weakness, light-headedness, numbness and headaches.   Hematological: Negative.    Psychiatric/Behavioral: Negative for agitation and behavioral problems.     Objective:   Physical Exam   Constitutional: She is oriented to person, place, and time. She appears well-developed and well-nourished. No distress.   HENT:   Head: Normocephalic and atraumatic.   Neck: Normal range of motion. Neck supple.   Cardiovascular: Regular rhythm and normal heart sounds.  Exam reveals no gallop and no friction rub.    No murmur heard.  Tachycardic     Pulmonary/Chest: Effort normal. No respiratory distress. She has no wheezes. She has no rales.   Decreased breath sounds in bilateral bases   Abdominal: Soft. Bowel sounds are normal. She exhibits mass (splenomegaly noted). She exhibits no distension. There is no tenderness. There is no guarding.   Musculoskeletal: She exhibits tenderness (lower back). She exhibits no edema or deformity.   TTP along thoracic and lumbar spine.  Normal sensation. Back ROM limited secondary to pain.    Neurological: She is alert and oriented to person, place, and time.   Skin: Skin is warm. No rash noted. She is not diaphoretic. No erythema. No pallor.   Psychiatric: She has a normal mood and affect. Her behavior is normal. Judgment and thought content normal.   Nursing note and vitals reviewed.        Vital Signs (Most Recent):  Temp: 98.3 °F (36.8 °C) (04/03/18 1102)  Pulse: 100 (04/03/18 1102)  Resp: (!) 22 (04/03/18 1102)  BP: (!) 89/51 (04/03/18 1102)  SpO2: (!) 90 % (04/03/18 1102) Vital Signs (24h Range):  Temp:  [98.2 °F (36.8 °C)-99.5 °F (37.5 °C)] 98.3 °F (36.8 °C)  Pulse:  [] 100  Resp:  [18-22] 22  SpO2:  [85 %-98 %] 90 %  BP: ()/(51-72)  89/51     Weight: 84.5 kg (186 lb 4.6 oz)  Body mass index is 28.33 kg/m².      Intake/Output Summary (Last 24 hours) at 04/03/18 1157  Last data filed at 04/03/18 0600   Gross per 24 hour   Intake           716.25 ml   Output                0 ml   Net           716.25 ml       Significant Labs:  CBC:    Recent Labs  Lab 04/03/18  0551   WBC 6.93   RBC 2.51*   HGB 7.5*   HCT 24.2*   *   MCV 96   MCH 29.9   MCHC 31.0*     BMP:    Recent Labs  Lab 04/03/18  0551   *   K 5.4*      CO2 20*   BUN 36*   CREATININE 1.6*   CALCIUM 8.9      Tacrolimus Levels:    Recent Labs  Lab 04/03/18 0551   TACROLIMUS 3.2*     Microbiology:  Microbiology Results (last 7 days)     Procedure Component Value Units Date/Time    Urine culture [[id number]] Collected:  04/02/18 0809    Order Status:  Completed Specimen:  Urine Updated:  04/03/18 0929     Urine Culture, Routine No significant growth    Narrative:       Preferred Collection Type->Urine, Clean Catch          I have reviewed all pertinent labs within the past 24 hours.    Diagnostic Results:  Labs: Reviewed  X-Ray: Reviewed      Assessment/Plan:     * CARROLL (acute kidney injury)    Acute on stage 3 CKD. BUN 36 and Cr 1.6 today.  Likely multifactorial - CARROLL vs tumor lysis vs tacrolimus toxicity.   Improved slightly with IVF. Urine sodium and creatinine ordered to determine FENA to distinguish pre-renal vs renal etiology. Will continue to monitor CMP daily. Will continue to hold nephrotoxic meds.         Lung replaced by transplant    History of A2 rejection in 2013, most recent bronchoscopy on 1/23 without evidence of rejection, BAL cx wnl.      Continue current outpatient immunosuppression and ppx. Will hold Bactrim and Prograf due to CARROLL.         Immunosuppression    Continue outpatient regimen - tacrolimus 1.5 mg BID, prednisone 5 mg daily.      Hold Prograf due to CARROLL.  Monitor daily Prograf levels. Will adjust dose as needed upon resolution of CARROLL.          Prophylactic antibiotic    Continue outpatient regimen with bactrim DS TIW. Will hold Bactrim until CARROLL resolution.         Back pain    Likely multifactorial - Patient is s/p RCHOP ending in 2016, known vertebral lytic lesion and stable L4 compression fracture. Outpatient meds include tramadol 50mg and norco 5/325.     Heme/onc following, suspicious for lymphoma recurrence.     Continue supportive care with pain meds.         Liver lesion    Hepatology following, appreciate recs. Discussed imaging with IR. Recommend moving forward with FNA without contrasted CT imaging due to underlying CARROLL of unknown etiology.             Anemia    Likely multifactorial - patient with history of PTLD s/p RCHOP ending in 2016, likely component of hemodilution with IVF administration yesterday. However, will determine etiology tumor lysis syndrome vs hemolytic anemia vs anemia of chronic disease.             Tomeka Francis PA-C  Lung Transplant  [facility]

## 2018-04-03 NOTE — ASSESSMENT & PLAN NOTE
Hepatology following, appreciate recs. Discussed imaging with IR. Recommend moving forward with FNA without contrasted CT imaging due to underlying CARROLL of unknown etiology.

## 2018-04-03 NOTE — NURSING
Pt sats dropped in 80's and placed pt on 3.5 liters n/c to get pt sat up to 90. md notified. Pt asymptomatic with no c/o sob and no accessory use for breathing. md to review chart and order cxr.

## 2018-04-03 NOTE — SUBJECTIVE & OBJECTIVE
Subjective:     Interval History: Patient with worsening hypoxia overnight.  Patient received 1000 cc bolus in ED prior to maintenance fluids on the floor. CXR shows evidence of pulmonary edema this AM.  Patient was put on 4L NC overnight for worsening O2. Patient denies SOB, chest pain, cough, lightheadedness, dizziness.  Currently 90% on 4L NC. Patient continues to complain of non-localized lumbar back pain.   Heme/Onc following for possible recurrent lymphoma vs. Tumor lysis syndrome.     Continuous Infusions:  Scheduled Meds:   aspirin  81 mg Oral Daily    citalopram  20 mg Oral Daily    enoxaparin  30 mg Subcutaneous Daily    magnesium oxide  400 mg Oral BID    pantoprazole  40 mg Oral Daily    predniSONE  5 mg Oral Daily    prochlorperazine  10 mg Intravenous Once     PRN Meds:acetaminophen, diphenhydrAMINE, hydrocodone-acetaminophen 5-325mg, potassium chloride 10% **AND** potassium chloride 10% **AND** potassium chloride 10%, traMADol, traZODone    Review of patient's allergies indicates:  No Known Allergies    Review of Systems   Constitutional: Positive for activity change. Negative for appetite change, chills and fever.   HENT: Negative for rhinorrhea, sinus pain and sore throat.    Eyes: Negative.    Respiratory: Positive for shortness of breath (unchanged from baseline). Negative for cough, wheezing and stridor.    Cardiovascular: Negative for chest pain and palpitations.   Gastrointestinal: Negative for abdominal pain, constipation, diarrhea, nausea and vomiting.   Genitourinary: Positive for flank pain. Negative for decreased urine volume, difficulty urinating, dysuria, frequency, hematuria, pelvic pain and urgency.   Musculoskeletal: Positive for back pain (lower). Negative for joint swelling.   Skin: Negative for color change, pallor and rash.   Allergic/Immunologic: Positive for immunocompromised state.   Neurological: Negative for dizziness, weakness, light-headedness, numbness and  headaches.   Hematological: Negative.    Psychiatric/Behavioral: Negative for agitation and behavioral problems.     Objective:   Physical Exam   Constitutional: She is oriented to person, place, and time. She appears well-developed and well-nourished. No distress.   HENT:   Head: Normocephalic and atraumatic.   Neck: Normal range of motion. Neck supple.   Cardiovascular: Regular rhythm and normal heart sounds.  Exam reveals no gallop and no friction rub.    No murmur heard.  Tachycardic     Pulmonary/Chest: Effort normal. No respiratory distress. She has no wheezes. She has no rales.   Decreased breath sounds in bilateral bases   Abdominal: Soft. Bowel sounds are normal. She exhibits mass (splenomegaly noted). She exhibits no distension. There is no tenderness. There is no guarding.   Musculoskeletal: She exhibits tenderness (lower back). She exhibits no edema or deformity.   TTP along thoracic and lumbar spine.  Normal sensation. Back ROM limited secondary to pain.    Neurological: She is alert and oriented to person, place, and time.   Skin: Skin is warm. No rash noted. She is not diaphoretic. No erythema. No pallor.   Psychiatric: She has a normal mood and affect. Her behavior is normal. Judgment and thought content normal.   Nursing note and vitals reviewed.        Vital Signs (Most Recent):  Temp: 98.3 °F (36.8 °C) (04/03/18 1102)  Pulse: 100 (04/03/18 1102)  Resp: (!) 22 (04/03/18 1102)  BP: (!) 89/51 (04/03/18 1102)  SpO2: (!) 90 % (04/03/18 1102) Vital Signs (24h Range):  Temp:  [98.2 °F (36.8 °C)-99.5 °F (37.5 °C)] 98.3 °F (36.8 °C)  Pulse:  [] 100  Resp:  [18-22] 22  SpO2:  [85 %-98 %] 90 %  BP: ()/(51-72) 89/51     Weight: 84.5 kg (186 lb 4.6 oz)  Body mass index is 28.33 kg/m².      Intake/Output Summary (Last 24 hours) at 04/03/18 1157  Last data filed at 04/03/18 0600   Gross per 24 hour   Intake           716.25 ml   Output                0 ml   Net           716.25 ml       Significant  Labs:  CBC:    Recent Labs  Lab 04/03/18  0551   WBC 6.93   RBC 2.51*   HGB 7.5*   HCT 24.2*   *   MCV 96   MCH 29.9   MCHC 31.0*     BMP:    Recent Labs  Lab 04/03/18 0551   *   K 5.4*      CO2 20*   BUN 36*   CREATININE 1.6*   CALCIUM 8.9      Tacrolimus Levels:    Recent Labs  Lab 04/03/18 0551   TACROLIMUS 3.2*     Microbiology:  Microbiology Results (last 7 days)     Procedure Component Value Units Date/Time    Urine culture [[id number]] Collected:  04/02/18 0809    Order Status:  Completed Specimen:  Urine Updated:  04/03/18 0929     Urine Culture, Routine No significant growth    Narrative:       Preferred Collection Type->Urine, Clean Catch          I have reviewed all pertinent labs within the past 24 hours.    Diagnostic Results:  Labs: Reviewed  X-Ray: Reviewed

## 2018-04-03 NOTE — ASSESSMENT & PLAN NOTE
Acute on stage 3 CKD. BUN 36 and Cr 1.6 today.  Likely multifactorial - CARROLL vs tumor lysis vs tacrolimus toxicity.   Improved slightly with IVF. Urine sodium and creatinine ordered to determine FENA to distinguish pre-renal vs renal etiology. Will continue to monitor CMP daily. Will continue to hold nephrotoxic meds.

## 2018-04-03 NOTE — PROGRESS NOTES
" [facility]  Adult Nutrition  Progress Note    SUMMARY       Recommendations    Recommendation/Intervention: Patient with poor oral intake x 1 week.   -Continue regular diet, encouraging intake. If potassium continues to climb, modify to low potassium diet. Due to poor oral intake and patient preference for yogurt, regular diet would be better to allow patient to consume preferred foods.   -Send Boost Plus Strawberry TID to aid in nutrient intake.    -RD following.     Goals: Consume/tolerate > 75% EEN/EPN  Nutrition Goal Status: new  Communication of RD Recs: reviewed with RN    Reason for Assessment    Reason for Assessment: consult  Diagnosis: transplant/postoperative complications (CARROLL)  Relevant Medical History: bilat lung tx 2012 2' pulmonary fibrosis, PTLD s/p chemotherapy 2016, cirrhosis, HTN, HLD  Interdisciplinary Rounds: did not attend  General Information Comments: pt and  report poor PO intake x 1 week, no weight loss, pt with CARROLL - P 5.4  Nutrition Discharge Planning: Adequate PO nutrition    Nutrition Risk Screen    Nutrition Risk Screen: no indicators present    Nutrition/Diet History    Patient Reported Diet/Restrictions/Preferences: low salt  Do you have any cultural, spiritual, Orthodox conflicts, given your current situation?: no  Factors Affecting Nutritional Intake: decreased appetite    Anthropometrics    Temp: 98.3 °F (36.8 °C)  Height Method: Stated  Height: 5' 7.99" (172.7 cm)  Height (inches): 67.99 in  Weight Method: Bed Scale  Weight: 84.5 kg (186 lb 4.6 oz)  Weight (lb): 186.29 lb  Ideal Body Weight (IBW), Female: 139.95 lb  % Ideal Body Weight, Female (lb): 133.69 lb  BMI (Calculated): 28.5  BMI Grade: 25 - 29.9 - overweight       Lab/Procedures/Meds    Pertinent Labs Reviewed: reviewed  Pertinent Labs Comments: GFR 39.8, Cr 1.6, BUN 36, Na 134, K 5.4, TBili 8.6  Pertinent Medications Reviewed: reviewed  Pertinent Medications Comments: Mg oxide, " pantoprazole, prednisone    Physical Findings/Assessment    Overall Physical Appearance: overweight, lethargic  Oral/Mouth Cavity: WDL  Skin: intact    Estimated/Assessed Needs    Weight Used For Calorie Calculations: 84.5 kg (186 lb 4.6 oz)  Energy Calorie Requirements (kcal): 1823 (1.25x PAL)  Energy Need Method: [place]-St Jeor  Protein Requirements: 85-102g (1-1.2 g/kg)  Weight Used For Protein Calculations: 84.5 kg (186 lb 4.6 oz)     Fluid Need Method: RDA Method  RDA Method (mL): 1823         Nutrition Prescription Ordered    Current Diet Order: Regular    Evaluation of Received Nutrient/Fluid Intake       % Intake of Estimated Energy Needs: 25 - 50 %  % Meal Intake: 25 - 50 %    Nutrition Risk    Level of Risk/Frequency of Follow-up:  (2x/week)     Assessment and Plan    * CARROLL (acute kidney injury)    Contributing Nutrition Diagnosis  Inadequate energy intake    Related to (etiology):   Poor appetite    Signs and Symptoms (as evidenced by):   Consuming <50% of meals x 1 week    Interventions/Recommendations (treatment strategy):  See recs    Nutrition Diagnosis Status:   New               Monitor and Evaluation    Food and Nutrient Intake: energy intake, food and beverage intake  Food and Nutrient Adminstration: diet order  Knowledge/Beliefs/Attitudes: food and nutrition knowledge/skill  Anthropometric Measurements: weight, weight change, body mass index  Biochemical Data, Medical Tests and Procedures: electrolyte and renal panel, lipid profile, gastrointestinal profile, glucose/endocrine profile, inflammatory profile  Nutrition-Focused Physical Findings: overall appearance     Nutrition Follow-Up    RD Follow-up?: Yes

## 2018-04-04 PROBLEM — D47.Z1 PTLD (POST-TRANSPLANT LYMPHOPROLIFERATIVE DISORDER): Status: ACTIVE | Noted: 2018-01-01

## 2018-04-04 PROBLEM — E88.3 TUMOR LYSIS SYNDROME: Status: ACTIVE | Noted: 2018-01-01

## 2018-04-04 PROBLEM — K72.90 LIVER FAILURE: Status: ACTIVE | Noted: 2018-01-01

## 2018-04-04 PROBLEM — E87.5 HYPERKALEMIA: Status: ACTIVE | Noted: 2018-01-01

## 2018-04-04 PROBLEM — G93.41 METABOLIC ENCEPHALOPATHY: Status: ACTIVE | Noted: 2018-01-01

## 2018-04-04 NOTE — PROGRESS NOTES
Pt arrived to the SICU via TSU charge nurse on 4 L of O2 per NC and on continuous cardiac monitoring. Pt currently confused to place and time but oriented to self and follows commands appropriately. Critical Care fellow called to bedside and is currently assessing pt. Orders received and implemented. Significant other currently at bedside and updated on plan of care. Will continue to monitor pt closely. See flowsheets for specific details.     Skin note: No skin breakdown noted.

## 2018-04-04 NOTE — ASSESSMENT & PLAN NOTE
BLT in 4/2012.  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  History of A2 rejection in 2013, most recent bronchoscopy on 1/23 without evidence of rejection, BAL cx wnl.      Continue current outpatient immunosuppression and ppx. Will hold Bactrim and Prograf due to CARROLL.

## 2018-04-04 NOTE — SUBJECTIVE & OBJECTIVE
Past Medical History:   Diagnosis Date    Acute rejection of lung transplant 12/12/2013    CARROLL (acute kidney injury)     Anemia 8/2/2016    Anxiety     Back pain 2016    Recently seen in ED for back pain    Cirrhosis     Depression     Hyperlipidemia     Hypertension     Lung transplant complication 1/10/2014    Lymphoma 8/10/2016    Obesity     DOROTHY (obstructive sleep apnea)     Osteoporosis     Postinflammatory pulmonary fibrosis        Past Surgical History:   Procedure Laterality Date    APPENDECTOMY      Removed at the time of hysterectomy    HYSTERECTOMY      LUNG TRANSPLANT Bilateral 04/25/2012       Review of patient's allergies indicates:  No Known Allergies  Current Facility-Administered Medications   Medication Frequency    0.9%  NaCl infusion (for blood administration) Q24H PRN    allopurinol tablet 100 mg Daily    ceFEPIme injection 2 g Q12H    citalopram tablet 20 mg Daily    dextrose 50% injection 25 g PRN    dextrose 50% injection 25 g PRN    dextrose 50% injection 25 g PRN    dextrose 50% injection 25 g PRN    diphenhydrAMINE capsule 50 mg Nightly PRN    enoxaparin injection 30 mg Daily    magnesium oxide tablet 400 mg BID    metronidazole IVPB 500 mg Q8H    pantoprazole EC tablet 40 mg Daily    predniSONE tablet 5 mg Daily     Family History     Problem Relation (Age of Onset)    Cancer Father, Brother, Maternal Uncle, Paternal Uncle    Colon cancer Father    Heart attack Paternal Aunt    Heart disease Mother    Heart failure Mother    Hypertension Father, Sister, Sister, Sister    No Known Problems Daughter, Son, Maternal Grandmother, Maternal Grandfather, Paternal Grandmother, Paternal Grandfather, Paternal Aunt, Paternal Aunt    Other Father        Social History Main Topics    Smoking status: Never Smoker    Smokeless tobacco: Never Used    Alcohol use No    Drug use: No    Sexual activity: Not Currently     Review of Systems   Unable to perform ROS: Mental  status change   Constitutional: Positive for fatigue. Negative for diaphoresis and fever.   HENT: Negative.    Eyes: Negative.    Respiratory: Positive for cough and shortness of breath.    Cardiovascular: Negative for chest pain, palpitations and leg swelling.   Gastrointestinal: Abdominal pain: right side.   Endocrine: Negative.    Genitourinary: Negative.    Musculoskeletal: Positive for back pain.   Neurological: Positive for weakness.     Objective:     Vital Signs (Most Recent):  Temp: (!) 101.4 °F (38.6 °C) (04/04/18 0700)  Pulse: 107 (04/04/18 0740)  Resp: (!) 36 (04/04/18 0740)  BP: (!) 91/53 (04/04/18 0545)  SpO2: (!) 93 % (04/04/18 0740)  O2 Device (Oxygen Therapy): nasal cannula (04/04/18 0740) Vital Signs (24h Range):  Temp:  [98.3 °F (36.8 °C)-101.5 °F (38.6 °C)] 101.4 °F (38.6 °C)  Pulse:  [100-119] 107  Resp:  [18-44] 36  SpO2:  [86 %-99 %] 93 %  BP: ()/(53-74) 91/53     Weight: 84.5 kg (186 lb 4.6 oz) (04/03/18 0400)  Body mass index is 28.33 kg/m².  Body surface area is 2.01 meters squared.    I/O last 3 completed shifts:  In: 2675 [P.O.:845; I.V.:580; IV Piggyback:1250]  Out: 650 [Urine:650]    Physical Exam   Constitutional: She appears well-developed and well-nourished.   HENT:   Head: Atraumatic.   Eyes: Pupils are equal, round, and reactive to light. Scleral icterus is present.   Neck: Neck supple.   Cardiovascular: Tachycardia present.    Pulmonary/Chest:   Inspiratory crackles mid to lower . Difficult exam due to mental status and fatigue.   Abdominal: Soft. Bowel sounds are normal. She exhibits no distension.   Musculoskeletal: She exhibits no edema or deformity.   Neurological: She is alert.   Oriented to self only.    Skin: Skin is warm and dry.   Psychiatric:   Confuse.        Significant Labs:  All labs within the past 24 hours have been reviewed.    Significant Imaging:  Labs: Reviewed

## 2018-04-04 NOTE — PROGRESS NOTES
[facility]  Hematology/Oncology  Progress Note    Patient Name: Lena Lynn  Admission Date: 4/2/2018  Hospital Length of Stay: 2 days  Code Status: Full Code     Subjective:     Interval History:     - Patient with worsening hypoxia which initially start a day prior.   - Noted to have decline in her neurologic function, became drowsy and disoriented.   - Yesterday, noted to have K+ of 7.3, minimal improvement with shift.    - Spiked temp of 103.5 last night.   - Transferred to ICU overnight for close observation. Remains on 4L of O2 per NC.     Medications:  Continuous Infusions:  Scheduled Meds:   allopurinol  100 mg Oral Daily    ceFEPime (MAXIPIME) IVPB  2 g Intravenous Q12H    citalopram  20 mg Oral Daily    enoxaparin  30 mg Subcutaneous Daily    magnesium oxide  400 mg Oral BID    metronidazole  500 mg Intravenous Q8H    pantoprazole  40 mg Oral Daily    predniSONE  5 mg Oral Daily    tacrolimus  0.5 mg Oral BID     PRN Meds:sodium chloride, [COMPLETED] dextrose 50% **AND** dextrose 50% **AND** [COMPLETED] insulin regular, dextrose 50%, dextrose 50%, dextrose 50%, diphenhydrAMINE     Review of Systems   Constitutional: Positive for fatigue and fever.   Respiratory: Positive for cough and shortness of breath.    Gastrointestinal: Positive for abdominal distention, abdominal pain and nausea.   Musculoskeletal: Positive for back pain.   Neurological: Positive for weakness.   Psychiatric/Behavioral: Positive for confusion.     Objective:     Vital Signs (Most Recent):  Temp: 99 °F (37.2 °C) (04/04/18 0555)  Pulse: (!) 112 (04/04/18 0600)  Resp: (!) 32 (04/04/18 0600)  BP: (!) 91/53 (04/04/18 0545)  SpO2: 96 % (04/04/18 0600) Vital Signs (24h Range):  Temp:  [98.2 °F (36.8 °C)-101.5 °F (38.6 °C)] 99 °F (37.2 °C)  Pulse:  [100-119] 112  Resp:  [18-44] 32  SpO2:  [86 %-99 %] 96 %  BP: ()/(53-74) 91/53     Weight: 84.5 kg (186 lb 4.6 oz)  Body mass index is 28.33 kg/m².  Body  surface area is 2.01 meters squared.      Intake/Output Summary (Last 24 hours) at 04/04/18 0602  Last data filed at 04/04/18 0200   Gross per 24 hour   Intake             2020 ml   Output              650 ml   Net             1370 ml       Physical Exam   Constitutional:   Appears weak.    HENT:   Head: Normocephalic and atraumatic.   Mouth/Throat: Oropharynx is clear and moist.   NGT   Eyes: Scleral icterus is present.   Neck: Neck supple.   Cardiovascular: Regular rhythm.    Tachycardic   Pulmonary/Chest: Effort normal.   Diminished breath sounds at base   Abdominal: Soft. She exhibits distension.   Diffuse mild tenderness.    Neurological:   Awake, Oriented to person, place. Confused at times.    Skin: Skin is warm and dry.       Significant Labs:   CBC:   Recent Labs  Lab 04/03/18 1646 04/04/18  0012 04/04/18  0251   WBC 16.10* 31.86* 15.63*   HGB 7.2* 5.9* 4.4*   HCT 23.9* 19.0* 14.6*   * 113* 108*   , CMP:   Recent Labs  Lab 04/03/18  0551 04/03/18  1646  04/04/18  0012 04/04/18  0251 04/04/18  0549   * 130*  < > 133* 134* 136   K 5.4* 7.3*  < > 7.6*  7.6* 6.9* 5.9*    100  < > 103 97 102   CO2 20* 17*  < > 11* 19* 18*   GLU 82 93  < > 111* 295* 161*   BUN 36* 43*  < > 56* 57* 61*   CREATININE 1.6* 1.7*  < > 2.1* 1.9* 2.2*   CALCIUM 8.9 9.3  < > 9.0 7.7* 8.4*   PROT 5.1* 6.0  --   --  4.3*  --    ALBUMIN 2.5* 2.9*  --   --  2.1*  --    BILITOT 8.6* 29.4*  --   --  28.9*  --    ALKPHOS 186* 237*  --   --  175*  --    AST 91* 167*  --   --  226*  --    ALT 21 22  --   --  18  --    ANIONGAP 9 13  < > 19* 18* 16   EGFRNONAA 34.5* 32.1*  < > 24.9* 28.1* 23.5*   < > = values in this interval not displayed., Coagulation:   Recent Labs  Lab 04/04/18  0254   INR 1.9*   APTT 23.7   , Haptoglobin:   Recent Labs  Lab 04/04/18  0549   HAPTOGLOBIN <10*   , LDH: No results for input(s): LDHCSF, BFSOURCE in the last 48 hours., Reticulocytes: No results for input(s): RETIC in the last 48 hours. and Uric  Acid   Recent Labs  Lab 04/03/18  0551 04/03/18  2057 04/04/18  0251   URICACID 4.7 2.2* 2.1*       Diagnostic Results:    US of Abdomen:    Splenomegaly with innumerable hypoechoic splenic lesions worrisome for metastatic disease.    Prominent mass lesion in the shanta hepatis, worrisome for metastatic adenopathy.    Hepatomegaly with innumerable complex predominantly hypoechoic hepatic lesions worrisome for metastatic disease.  Satisfactory Doppler evaluation of the liver, without portal vein thrombosis.    No biliary ductal dilatation.    Assessment/Plan:     Active Diagnoses:    Diagnosis Date Noted POA    PRINCIPAL PROBLEM:  CARROLL (acute kidney injury) [N17.9] 04/02/2018 Yes    Liver lesion [K76.9] 04/02/2018 Yes    Prophylactic antibiotic [Z79.2] 03/26/2018 Not Applicable    Back pain [M54.9] 08/03/2016 Yes    Anemia [D64.9] 08/02/2016 Yes    Immunosuppression [D89.9] 09/06/2012 Yes    Lung replaced by transplant [Z94.2] 07/12/2012 Not Applicable      Problems Resolved During this Admission:    Diagnosis Date Noted Date Resolved POA       Multiple Hepatic/Splenic Lesions  Hx of PTLD/DLBCL  Severe Anemia/Hemolytic Anemia  Hyperbilirubinemia  CARROLL/TLS     - s/p 6 cycles of R-CHOP in 2016 with post-treatment PET showing NEYMAR.   - now with worsening cytopenias, elevated LDH, and abnormal US/CT findings, concern for relapse disease.  - awaiting tissue diagnosis, scheduled to have IR guided Liver Biopsy (delayed due to recent aspirin intake-4/3)    - uric acid on presentation was 11.9 mg/dL along with renal insufficiency, received dose of Rasburicase given concern for TLS.    - Uric Acid improved now, started on low dose allopurinol given renal insufficiency. Consider gentle hydration as tolerated.   - Hb/Hct now 4.4 g/dL/14.6% with MCV: 106, Plt: 108,000/mm3, concern for hemolysis.   - peripheral smear shows spherocytes, polychromasia. No bite cells. Rare schistocytes. See atypical lymphocytes.   - Added  reticulocyte count and direct harsha to rest of hemolysis labs.   - check fibrinogen and transfuse 1 unit of cryoprecipitate if < 100.  - recommend Vit K 5 mg x 3 days for coagulopathy.   - start folic acid 1 mg daily.   - monitor coags/fibrinogen closely.    - likely the aggressive nature and with high tumor burden of her PTLD/DLBCL contributing to her acute decompensation, coincident hemolytic anemia secondary to immune-mediated red blood cell destruction.  - other potential differential to consider is G6PD Deficiency related hemolysis, however, she was exposed to Rasburicase on 8/13/16 and was on Bactrim PPx for opportunistic infections for long duration.  - given her symptomatic disease, worsening cytopenias/hemolysis, and liver dysfunction. Would recommend starting treatment empirically to temporize her disease condition while awaiting tissue.   - start Prednisone 100 mg daily x 5 days. Monitor electrolytes, uric acid daily given concern for TLS.   - sent for EBV DNA, quantitative study.   - recommend Ursodiol for hyperbilirubinemia.     - meanwhile, continue with supportive care and PRN transfusions.  - Transfusion PRN Hb < 7 g/dL, leukoreduced, irradiated.      Case discussed with Dr. Zina Daniels.     Thank you for your consult. I will follow-up with patient. Please contact us if you have any additional questions.     Piotr Spears MD  Hematology/Oncology  [facility]

## 2018-04-04 NOTE — ASSESSMENT & PLAN NOTE
Likely related to hemolysis, tumor lysis syndrome, and acidosis from liver failure.  Eventually responded to multiple doses of insulin and D50, 3 amps of bicarb, and IVF.  On Kayexalate with appropriate bowel movements.  EKG without acute changes.    Will space out labs to q6 hours overnight.  Continue to shift potassium as needed.

## 2018-04-04 NOTE — ASSESSMENT & PLAN NOTE
CARROLL with hyperkalemia and AGMA superimposed on CKD stage 3 due to IATN/episodes of hypotension/now severe anemia/? Sepsis-work up in progress,  pre renal- poor intake with NV x 1 week, and TLS (Rasburicase on 4/2 UA 11.9 >2.1)  -US kidneys from last month showed CKD.   -UA protein 1 +. Amina 105.   -Baseline Cr 1.2-1.3  -K 5.6 after shift and kaexalate today. 2 large BM now with diarrhea. Acidosis also improving after bicarb this am.   -VBG pH 7.39 Mixed respiratory alkalosis with metabolic acidosis.   -Difficult to assess UOP due to now diarrhea. Blood pressure remains on normal low side. Hopefully improves as anemia improves.  Patient continues to have loose stool after Kaexylate this am.  -Strict IO and serial RFP. No indication for RRT at this time. Hold further bicarb at this time.  -Obtain UPRC, G6PD

## 2018-04-04 NOTE — PROGRESS NOTES
Pt currently oriented to self only and on 4 L of O2 per NC. 2 units of PRBCs currently infusing for decreased H&H. NG tube placed to decompresses stomach and KUB ordered for absent bowel sounds. 1 L of NS bolus given for lactic greater than 12. T-max 101.5 this shift; pt becoming more normothermic with most recent temps in the 99's. Labs being monitored very closely. Pt was given kayexalate for hyperkalemia and shifted with insulin twice while in ICU. 3 amps of bicarb given for acidosis this shift. Dr. Mc currently at bedside managing pt care and updated frequently on new assessment findings and lab results. No new orders received at this time. Will continue to monitor pt closely. See flowsheets for specific details.

## 2018-04-04 NOTE — ASSESSMENT & PLAN NOTE
Acute on stage 3 CKD.  Likely multifactorial - CARROLL vs tumor lysis vs tacrolimus toxicity.  FENa consistent with pre-renal etiology.    Continue to monitor.  Renally dose medications as needed.

## 2018-04-04 NOTE — ASSESSMENT & PLAN NOTE
Related to liver failure.  Ammonia 41.  Having bowel movements with Kayexalate.  Mentation improved today.  CT head without acute changes.    Continue to monitor.

## 2018-04-04 NOTE — PLAN OF CARE
During the day, patient's K+ came back at 7.3. Patient's K+ was shifted with insulin/D50 and she was given Kayexalate prior to night shift.     During night shift, K+ continued to stay at 7.3. She was shifted again. Additionally, with concerns for tumor lysis syndrome and possible sepsis patient was given a liter bolus of NS. Blood cultures were drawn and abx Vanc/Cefepime/Flagyl were started. Next set of labs showed K+ up to 7.6 and a hgb of 5.9 (there was question about whether this was drawn in the arm receiving NS bolus). Phos came back at 2.7.    Patient was then transferred to the ICU and spiked a 101.5 fever. Port was accessed for lab draws. At this time, patient was shifted for K+ again. EKG did not show peaked T waves. BMP also came back for worsening metabolic acidosis with a bicarb 17 --> 13 --> 11 and AG 13 --> 17 --> 19. Delta/delta <1 so 3 amps of bicarb were administered. ABG showed good compensation 7.47/25. Nephrology was consulted for possible need for dialysis.    Repeat BMP showed K+ of 6.9 and bicarb of 19. AG was 18 with lactic acid >12 with pH of 7.39 - likely due to dehydration and possible sepsis. D50/insulin 10U pushed again for K+ shifting. Nephrology and I made the decision to continue with hydration and re-check labs. Repeat BMP and lactate at 6:00 a.m.     Repeat hgb 4.4 - 2U PRBC ordered. Patient's abdomen distended - femoral pulses intact. NG tube placed to suction without gastric contents back. Repeat hgb after both units transfused. Hemolysis labs ordered for 6:00 a.m.    U/S of the liver with doppler completed which shows mass lesion in the shanta hepatis and hepatic lesions worrisome for metastatic disease. There is no portal vein thrombosis or ductal dilatation. Bilirubin continues to be elevated at 28.9. Patient does not have any abdominal pain.    Jesusita Greer MD  [facility] P[facility] Fellow

## 2018-04-04 NOTE — PROGRESS NOTES
Admit Note     Met with patient and daughter in law to assess patients needs due to pt falling in and out of sleep due to medications (per family).  Patient is a 62 y.o.  female, admitted for Lung replaced by transplant [Z94.2]  Prophylactic antibiotic [Z79.2]  Immunosuppression [D89.9]  CARROLL (acute kidney injury) [N17.9]  Acute low back pain without sciatica, unspecified back pain laterality [M54.5]  CARROLL (acute kidney injury) [N17.9]  Pt s/p lung transplant 4/25/2012.    Patient admitted from home on 4/2/2018 .  At this time, patient presents as in and out of sleep due to medications per family.  At this time, patients caregiver presents as alert and oriented x 4, pleasant, good eye contact, well groomed, recall good, concentration/judgement good and asking and answering questions appropriately.      Household/Family Systems (as reported by patients caregiver)     Patient resides with patient's grand daughter, at [address] [place] [address].  Support system includes daughter, son and other family members/friends. Pt's daughter in law Duong (# [phone]) was present in room.  Patient does not have dependents that are need of being cared for.     Patients primary caregiver is Hattie Snider, patients daughter, phone number [phone].    Pts son is also listed as emergency contact and support system: Fabricio Pimentel #[phone] Confirmed patients contact information is [phone] (home);   Telephone Information:   Mobile [phone]   .    During admission, patient's caregiver plans to stay in patient's room.  Confirmed patient and patients caregivers do have access to reliable transportation.    Cognitive Status/Learning     Patients caregiver reports patients reading ability as 12th grade and states patient does have difficulty with seeing and short term memory.  Pt wears glasses. Patients caregiver reports patient learns best by Combination of  visual/auditory/hands-on.   Needed: No.   Highest education level: High School (9-12) or GED    Vocation/Disability (as reported by patients caregiver)    Working for Income: No  If no, reason not working: Disability  Patient is disabled due to IPF since Dec. 2011.  Prior to disability, patient  was employed as housekeeping at [employer].    Adherence     Patients caregiver reports patient has a high level of adherence to patients health care regimen.  Adherence counseling and education provided.  Patient's caregiver verbalizes understanding.    Substance Use    Patients caregiver reports patients substance usage as the following:    Tobacco: none, patient denies any use.  Alcohol: none, patient denies any use.  Illicit Drugs/Non-prescribed Medications: none, patient denies any use.  Patients caregiver states clear understanding of the potential impact of substance use.  Substance abstinence/cessation counseling, education and resources provided and reviewed.     Services Utilizing/ADLS (as reported by patients caregiver)    Infusion Service: Prior to admission, patient utilizing? no  Home Health: Prior to admission, patient utilizing? no  DME: Prior to admission, no  Pulmonary/Cardiac Rehab: Prior to admission, no  Dialysis:  Prior to admission, no  Transplant Specialty Pharmacy:  Prior to admission, no.   [facility] - [place], [place] - [address]  [address] [address]  Phone: [phone] Fax: [phone]    [id number] - [place] - [address] [facility]  [address] [place] [address]  Phone: [phone] Fax: [phone]      Prior to admission, patients caregiver reports patient was independent with ADLS and was not driving.  Patients caregiver reports patient is not able to care for self at this time due to compromised medical condition (as documented in medical record) and physical weakness..  Patients caregiver  "reports patient indicates a willingness to care for self once medically cleared to do so.    Insurance/Medications    Insured by   Payor/Plan Subscr  Sex Relation Sub. Ins. ID Effective Group Num   1. [employer] MANAGE* GERARD JACKSON 1955 Female  [id number] [birth year] [id number]                                   [address] [address]      Primary Insurance (for UNOS reporting): Public Insurance - Medicare & Choice  Secondary Insurance (for UNOS reporting): None    Patients caregiver reports patient is able to obtain and afford medications at this time and at time of discharge.    Living Will/Healthcare Power of     Patients caregiver reports patient does not have a LW and/or HCPA.   provided education regarding LW and HCPA and the completion of forms.    Coping/Mental Health (as reported by patients caregiver)    Patient unable to be assessed at this time. Pt caregiver states pt was experiencing what family felt was "depression." Caregiver states pt was staying in her room 2-3 days at a time, isolating herself from friends and family, and no longer attending [employer]. Caregiver uncertain if pt was still taking antidepressants.  Pt currently now awake. Patients caregiver is coping adequately with the aid of  family members and friends.      Discharge Planning (as reported by patients caregiver)    At time of discharge, patient plans to return to patient's home under the care of family.  Patients family will transport patient.  Per rounds today, expected discharge date has not been medically determined at this time. Patients caretaker verbalizes understanding and is involved in treatment planning and discharge process.    Additional Concerns    Patient is being followed for needs, education, resources, information, emotional support, supportive counseling, and for supportive and skilled discharge plan of care.  providing ongoing psychosocial support, education, resources and d/c " planning as needed.  SW remains available.  remains available. Patient's caregiver verbalizes understanding and agreement with information reviewed,  availability and how to access available resources as needed.

## 2018-04-04 NOTE — ASSESSMENT & PLAN NOTE
Has had a down trending hemoglobin for the last 2 months.  Was admitted for symptomatic anemia on 3/27/2018.  Work up for anemia at that time was negative except for elevated LDH.  Presented this admission with stable Hgb and initial down trend likely related to volume resuscitation.  Overnight, patterns seem more consistent with a hemolytic anemia.    Hematology following.  Orders pending.  Will follow CBC q6 hours.  Currently transfused 2u RBCs.

## 2018-04-04 NOTE — ASSESSMENT & PLAN NOTE
Outpatient regimen - tacrolimus 1.5 mg BID, prednisone 5 mg daily.      Tacrolimus on hold for CARROLL and hyperkalemia.  Currently getting 5 days high dose Solu-Medrol 80mg for suspected lymphoma.

## 2018-04-04 NOTE — PLAN OF CARE
Patient evaluated at 2300 this evening after reviewing critical labs.  She was admitted for CARROLL.  Found to have new liver lesion and splenomegaly concerning for recurrence of her PTLD.  Hepatology and heme/onc have been following.  She was found to have an elevated uric acid level and is being treated for possible tumor lysis.  She received Rasbucaise x1 and is currently on Allopurinol with normalization of her uric acid level.  IR was consulted for liver biopsy which is currently on hold until ASA has been held for 5 days.        She was evaluated this afternoon on bedside rounds and was noted to be drowsy and jaundiced.  Stat labs were obtained which included an ABG which showed 7.47/25/140/99% on 5L.  CMP was pertinent for a potassium of 7.3, CO2 17, creatinine 1.7, total bili of 30, and increased alk phos and AST from previous.  Her SaO2 improved once the probe was switched to her ear.  She was given insulin/D5 and started on Kayexalate for her hyperkalemia.  Heme/onc was notified and agreed that there was no indication for further Rasbucaise and to add IVF.    Her 4 hour repeat potassium with BMP showed continued hyperkalemia at 7.3, CO2 of 13, anion gap of 17, and creatinine of 1.9.  Her vitals have remained stable and are currently , /68, RR 18, and SaO2 88-94% on 4L NC.  She is arousable and conversant but falls asleep while questioning--unchanged from earlier.  Her skin is jaundiced and she has scleral icterus.  She has had nausea with the kayexalate and has not yet had a bowel movement.  CT head was ordered and read is pending.  Ammonia level was drawn and was 41.        Plan:    Updated her fiance who is at bedside regarding her current condition including her hyperkalemia, worsening liver function, concern for recurrence of her lymphoma, CARROLL, and metabolic encephalopathy.  He verbalized understanding and all questions were answered.    - Will move to ICU for increased level of care.  Will  remain on the lung transplant service.  Continuous cardiac and pulse ox monitoring.  Her SaO2 does not correlate with her ABG--plan to repeat ABG for any drops in oxygen.    - Received insulin/D5 for potassium shifting.  No apparent EKG changes on tele.  Full 12 lead EKG ordered.  ABG with slight alkalosis therefore bicarb held for now.  Continue with kayexalate if tolerates.   -Next labs at 1am.  If remains hyperkalemic will consult nephrology for further assistance.    - Onetime 1L saline ordered for hyperkalemia, possible tumor lysis, and CARROLL (FENa 0.79--pre-renal).  Monitor volume status closely.     - Blood cultures obtained and broad spectrum abx with Vanc/Cefepime/Flagyl initiated.       - Stat RUQ ultrasound with dopplers ordered.    - Continue to hold all sedating medications.  Hold Tacrolimus for now in the setting of CARROLL and hyperkalemia.  Will monitor hemoglobin closely.  Type and cross ordered.  Will need to be cautious with any transfusions given her hyperkalemia.    Critical care fellow at bedside and aware of the patient should any emergent issues arise overnight.    Shavon Batres DO  [facility] Fellow  [phone]

## 2018-04-04 NOTE — SUBJECTIVE & OBJECTIVE
Subjective:     Interval History:     Yesterday developed worsening hypoxemia which did not correlate with her ABG--improved with changing the probe.  Overnight, she remained drowsy and would nod off with questioning.  Labs showed hyperkalemia, worsening liver function, and a metabolic acidosis.  She was stepped to the ICU for close monitoring.  Her potassium improved slightly after multiple rounds of insulin, D50, amps of bicarb, and IVF.  She was started on broad spectrum abx and had a stat RUQ US with dopplers and CT head.  Her hemoglobin trended down to 4.4 and she was transfused 2 units of RBC.  Nephrology was consulted overnight in case emergent HD was needed.      Since being in the ICU, she has had persistent fevers over 101F.  Her mentation has slightly improved and she is more responsive this am but continues to be disoriented and nod off to sleep.  She had a NGT placed for nausea and vomiting with inability to tolerate po medications.  She is now having bowel movements with the Kayexalate.    Patient reports being in pain in her back and her RUQ.  No other concerns noted from patient or nursing staff.    Continuous Infusions:  Scheduled Meds:   allopurinol  100 mg Oral Daily    ceFEPime (MAXIPIME) IVPB  2 g Intravenous Q12H    citalopram  20 mg Oral Daily    enoxaparin  30 mg Subcutaneous Daily    magnesium oxide  400 mg Oral BID    metronidazole  500 mg Intravenous Q8H    pantoprazole  40 mg Oral Daily     PRN Meds:sodium chloride, [COMPLETED] dextrose 50% **AND** dextrose 50% **AND** [COMPLETED] insulin regular, dextrose 50%, dextrose 50%, dextrose 50%, diphenhydrAMINE, morphine    Review of patient's allergies indicates:  No Known Allergies    Review of Systems   Constitutional: Positive for activity change. Negative for appetite change, chills and fever.   HENT: Negative for rhinorrhea, sinus pain and sore throat.    Eyes: Negative.    Respiratory: Positive for shortness of breath (unchanged  from baseline). Negative for cough, wheezing and stridor.    Cardiovascular: Negative for chest pain and palpitations.   Gastrointestinal: Negative for abdominal pain, constipation, diarrhea, nausea and vomiting.   Genitourinary: Positive for flank pain. Negative for decreased urine volume, difficulty urinating, dysuria, frequency, hematuria, pelvic pain and urgency.   Musculoskeletal: Positive for back pain (lower). Negative for joint swelling.   Skin: Negative for color change, pallor and rash.   Allergic/Immunologic: Positive for immunocompromised state.   Neurological: Negative for dizziness, weakness, light-headedness, numbness and headaches.   Hematological: Negative.    Psychiatric/Behavioral: Positive for confusion. Negative for agitation and behavioral problems.     Objective:   Physical Exam   Constitutional: She appears well-developed and well-nourished. No distress.   HENT:   Head: Normocephalic and atraumatic.   Mouth/Throat: No oropharyngeal exudate.   Eyes: EOM are normal. Pupils are equal, round, and reactive to light. Scleral icterus is present.   Neck: Normal range of motion. Neck supple. No tracheal deviation present.   Cardiovascular: Regular rhythm and normal heart sounds.  Exam reveals no gallop and no friction rub.    No murmur heard.  Tachycardic     Pulmonary/Chest: Effort normal. No stridor. No respiratory distress. She has no wheezes. She has no rales.   Decreased breath sounds in bilateral bases   Abdominal: Soft. Bowel sounds are normal. She exhibits mass (splenomegaly noted). She exhibits no distension. There is no tenderness. There is no guarding.   Musculoskeletal: She exhibits edema and tenderness (lower back). She exhibits no deformity.   TTP along thoracic and lumbar spine.  Normal sensation. Back ROM limited secondary to pain.    Neurological:   Drowsy but arousable to questions.  Nods off to sleep during questioning.   Skin: Skin is warm. No rash noted. She is not diaphoretic. No  erythema. No pallor.   Jaundiced   Psychiatric: She has a normal mood and affect. Her behavior is normal. Judgment and thought content normal.   Nursing note and vitals reviewed.        Vital Signs (Most Recent):  Temp: 99.1 °F (37.3 °C) (04/04/18 1100)  Pulse: 107 (04/04/18 1200)  Resp: (!) 28 (04/04/18 1200)  BP: (!) 101/58 (04/04/18 1200)  SpO2: 98 % (04/04/18 1200) Vital Signs (24h Range):  Temp:  [98.4 °F (36.9 °C)-101.5 °F (38.6 °C)] 99.1 °F (37.3 °C)  Pulse:  [100-119] 107  Resp:  [18-44] 28  SpO2:  [86 %-99 %] 98 %  BP: ()/(53-74) 101/58     Weight: 84.5 kg (186 lb 4.6 oz)  Body mass index is 28.33 kg/m².      Intake/Output Summary (Last 24 hours) at 04/04/18 1232  Last data filed at 04/04/18 0800   Gross per 24 hour   Intake          3329.17 ml   Output              650 ml   Net          2679.17 ml       Ventilator Data:          Hemodynamic Parameters:       Lines/Drains:       Port A Cath Single Lumen 10/03/16 right subclavian (Active)   Accessed by: ALEX Schuler RN 4/4/2018  3:00 AM   Dressing Type Transparent 4/4/2018 11:00 AM   Dressing Status Clean;Dry;Intact 4/4/2018 11:00 AM   Dressing Intervention New dressing 4/4/2018  3:00 AM   Dressing Change Due 04/11/18 4/4/2018  3:00 AM   Line Status Blood return noted 4/4/2018 11:00 AM   Flush Performed Yes 4/4/2018 11:00 AM   Daily Line Review Performed 4/4/2018 11:00 AM   Type of Needle Adrian 4/4/2018 11:00 AM   Needle Insertion Date 04/04/18 4/4/2018  3:00 AM   Number of days: 548            Peripheral IV - Single Lumen 04/02/18 0409 Right Antecubital (Active)   Site Assessment Clean;Dry;Intact 4/4/2018 11:00 AM   Line Status Infusing 4/4/2018 11:00 AM   Dressing Status Clean;Dry;Intact 4/4/2018 11:00 AM   Dressing Intervention Dressing reinforced 4/4/2018  3:00 AM   Dressing Change Due 04/06/18 4/4/2018  3:00 AM   Site Change Due 04/06/18 4/4/2018  3:00 AM   Reason Not Rotated Not due 4/4/2018 11:00 AM   Number of days: 2            NG/OG Tube  04/04/18 0452 Rosario sump Right nostril (Active)   Placement Check placement verified by aspirate characteristics 4/4/2018 11:00 AM   Distal Tube Length (cm) 60 4/4/2018  7:00 AM   Tolerance no signs/symptoms of discomfort 4/4/2018 11:00 AM   Securement taped to nostril center 4/4/2018 11:00 AM   Clamp Status/Tolerance unclamped 4/4/2018 11:00 AM   Suction Setting/Drainage Method low;suction at;intermittent setting 4/4/2018 11:00 AM   Insertion Site Appearance no redness, warmth, tenderness, skin breakdown, drainage 4/4/2018 11:00 AM   Drainage Yellow;Green 4/4/2018 11:00 AM   Flush/Irrigation flushed w/;no resistance met 4/4/2018 11:00 AM   Number of days: 0       Significant Labs:  CBC:    Recent Labs  Lab 04/04/18  0251   WBC 15.63*   RBC 1.38*   HGB 4.4*   HCT 14.6*   *   *   MCH 31.9*   MCHC 30.1*     BMP:    Recent Labs  Lab 04/04/18  1001      K 5.6*  5.6*      CO2 22*   BUN 65*   CREATININE 2.3*   CALCIUM 8.4*      Tacrolimus Levels:    Recent Labs  Lab 04/03/18  0551   TACROLIMUS 3.2*     Microbiology:  Microbiology Results (last 7 days)     Procedure Component Value Units Date/Time    Blood culture [[id number]] Collected:  04/04/18 0023    Order Status:  Completed Specimen:  Blood Updated:  04/04/18 0915     Blood Culture, Routine No Growth to date    Blood culture [[id number]] Collected:  04/04/18 0007    Order Status:  Completed Specimen:  Blood Updated:  04/04/18 0915     Blood Culture, Routine No Growth to date    Blood culture [[id number]]     Order Status:  Canceled Specimen:  Blood     Blood culture [[id number]]     Order Status:  Canceled Specimen:  Blood     Urine culture [[id number]] Collected:  04/02/18 0809    Order Status:  Completed Specimen:  Urine Updated:  04/03/18 0929     Urine Culture, Routine No significant growth    Narrative:       Preferred Collection Type->Urine, Clean Catch          I have reviewed all pertinent labs within the past 24 hours.    Diagnostic  Results:  Chest X-Ray: bilateral patchy infiltrates worse in the bases; elevated left hemidiaphragm     ECG: No peaked T waves or prolonged intervals

## 2018-04-04 NOTE — PROGRESS NOTES
[facility]  Lung Transplant  Progress Note - Critical Care    Patient Name: Lena Lynn  MRN: [id number]  Admission Date: 4/2/2018  Hospital Length of Stay: 2 days  Post-Operative Day: 2170  Attending Physician: Erick Nieves MD  Primary Care Provider: Erick Nieves MD     Subjective:     Interval History:     Yesterday developed worsening hypoxemia which did not correlate with her ABG--improved with changing the probe.  Overnight, she remained drowsy and would nod off with questioning.  Labs showed hyperkalemia, worsening liver function, and a metabolic acidosis.  She was stepped to the ICU for close monitoring.  Her potassium improved slightly after multiple rounds of insulin, D50, amps of bicarb, and IVF.  She was started on broad spectrum abx and had a stat RUQ US with dopplers and CT head.  Her hemoglobin trended down to 4.4 and she was transfused 2 units of RBC.  Nephrology was consulted overnight in case emergent HD was needed.      Since being in the ICU, she has had persistent fevers over 101F.  Her mentation has slightly improved and she is more responsive this am but continues to be disoriented and nod off to sleep.  She had a NGT placed for nausea and vomiting with inability to tolerate po medications.  She is now having bowel movements with the Kayexalate.    Patient reports being in pain in her back and her RUQ.  No other concerns noted from patient or nursing staff.    Continuous Infusions:  Scheduled Meds:   allopurinol  100 mg Oral Daily    ceFEPime (MAXIPIME) IVPB  2 g Intravenous Q12H    citalopram  20 mg Oral Daily    enoxaparin  30 mg Subcutaneous Daily    magnesium oxide  400 mg Oral BID    metronidazole  500 mg Intravenous Q8H    pantoprazole  40 mg Oral Daily     PRN Meds:sodium chloride, [COMPLETED] dextrose 50% **AND** dextrose 50% **AND** [COMPLETED] insulin regular, dextrose 50%, dextrose 50%, dextrose 50%, diphenhydrAMINE, morphine    Review of  patient's allergies indicates:  No Known Allergies    Review of Systems   Constitutional: Positive for activity change. Negative for appetite change, chills and fever.   HENT: Negative for rhinorrhea, sinus pain and sore throat.    Eyes: Negative.    Respiratory: Positive for shortness of breath (unchanged from baseline). Negative for cough, wheezing and stridor.    Cardiovascular: Negative for chest pain and palpitations.   Gastrointestinal: Negative for abdominal pain, constipation, diarrhea, nausea and vomiting.   Genitourinary: Positive for flank pain. Negative for decreased urine volume, difficulty urinating, dysuria, frequency, hematuria, pelvic pain and urgency.   Musculoskeletal: Positive for back pain (lower). Negative for joint swelling.   Skin: Negative for color change, pallor and rash.   Allergic/Immunologic: Positive for immunocompromised state.   Neurological: Negative for dizziness, weakness, light-headedness, numbness and headaches.   Hematological: Negative.    Psychiatric/Behavioral: Positive for confusion. Negative for agitation and behavioral problems.     Objective:   Physical Exam   Constitutional: She appears well-developed and well-nourished. No distress.   HENT:   Head: Normocephalic and atraumatic.   Mouth/Throat: No oropharyngeal exudate.   Eyes: EOM are normal. Pupils are equal, round, and reactive to light. Scleral icterus is present.   Neck: Normal range of motion. Neck supple. No tracheal deviation present.   Cardiovascular: Regular rhythm and normal heart sounds.  Exam reveals no gallop and no friction rub.    No murmur heard.  Tachycardic     Pulmonary/Chest: Effort normal. No stridor. No respiratory distress. She has no wheezes. She has no rales.   Decreased breath sounds in bilateral bases   Abdominal: Soft. Bowel sounds are normal. She exhibits mass (splenomegaly noted). She exhibits no distension. There is no tenderness. There is no guarding.   Musculoskeletal: She exhibits  edema and tenderness (lower back). She exhibits no deformity.   TTP along thoracic and lumbar spine.  Normal sensation. Back ROM limited secondary to pain.    Neurological:   Drowsy but arousable to questions.  Nods off to sleep during questioning.   Skin: Skin is warm. No rash noted. She is not diaphoretic. No erythema. No pallor.   Jaundiced   Psychiatric: She has a normal mood and affect. Her behavior is normal. Judgment and thought content normal.   Nursing note and vitals reviewed.        Vital Signs (Most Recent):  Temp: 99.1 °F (37.3 °C) (04/04/18 1100)  Pulse: 107 (04/04/18 1200)  Resp: (!) 28 (04/04/18 1200)  BP: (!) 101/58 (04/04/18 1200)  SpO2: 98 % (04/04/18 1200) Vital Signs (24h Range):  Temp:  [98.4 °F (36.9 °C)-101.5 °F (38.6 °C)] 99.1 °F (37.3 °C)  Pulse:  [100-119] 107  Resp:  [18-44] 28  SpO2:  [86 %-99 %] 98 %  BP: ()/(53-74) 101/58     Weight: 84.5 kg (186 lb 4.6 oz)  Body mass index is 28.33 kg/m².      Intake/Output Summary (Last 24 hours) at 04/04/18 1232  Last data filed at 04/04/18 0800   Gross per 24 hour   Intake          3329.17 ml   Output              650 ml   Net          2679.17 ml       Ventilator Data:          Hemodynamic Parameters:       Lines/Drains:       Port A Cath Single Lumen 10/03/16 right subclavian (Active)   Accessed by: ALEX Schuler RN 4/4/2018  3:00 AM   Dressing Type Transparent 4/4/2018 11:00 AM   Dressing Status Clean;Dry;Intact 4/4/2018 11:00 AM   Dressing Intervention New dressing 4/4/2018  3:00 AM   Dressing Change Due 04/11/18 4/4/2018  3:00 AM   Line Status Blood return noted 4/4/2018 11:00 AM   Flush Performed Yes 4/4/2018 11:00 AM   Daily Line Review Performed 4/4/2018 11:00 AM   Type of Needle Adrian 4/4/2018 11:00 AM   Needle Insertion Date 04/04/18 4/4/2018  3:00 AM   Number of days: 548            Peripheral IV - Single Lumen 04/02/18 0409 Right Antecubital (Active)   Site Assessment Clean;Dry;Intact 4/4/2018 11:00 AM   Line Status Infusing 4/4/2018  11:00 AM   Dressing Status Clean;Dry;Intact 4/4/2018 11:00 AM   Dressing Intervention Dressing reinforced 4/4/2018  3:00 AM   Dressing Change Due 04/06/18 4/4/2018  3:00 AM   Site Change Due 04/06/18 4/4/2018  3:00 AM   Reason Not Rotated Not due 4/4/2018 11:00 AM   Number of days: 2            NG/OG Tube 04/04/18 0452 Rosario meyer Right nostril (Active)   Placement Check placement verified by aspirate characteristics 4/4/2018 11:00 AM   Distal Tube Length (cm) 60 4/4/2018  7:00 AM   Tolerance no signs/symptoms of discomfort 4/4/2018 11:00 AM   Securement taped to nostril center 4/4/2018 11:00 AM   Clamp Status/Tolerance unclamped 4/4/2018 11:00 AM   Suction Setting/Drainage Method low;suction at;intermittent setting 4/4/2018 11:00 AM   Insertion Site Appearance no redness, warmth, tenderness, skin breakdown, drainage 4/4/2018 11:00 AM   Drainage Yellow;Green 4/4/2018 11:00 AM   Flush/Irrigation flushed w/;no resistance met 4/4/2018 11:00 AM   Number of days: 0       Significant Labs:  CBC:    Recent Labs  Lab 04/04/18  0251   WBC 15.63*   RBC 1.38*   HGB 4.4*   HCT 14.6*   *   *   MCH 31.9*   MCHC 30.1*     BMP:    Recent Labs  Lab 04/04/18  1001      K 5.6*  5.6*      CO2 22*   BUN 65*   CREATININE 2.3*   CALCIUM 8.4*      Tacrolimus Levels:    Recent Labs  Lab 04/03/18  0551   TACROLIMUS 3.2*     Microbiology:  Microbiology Results (last 7 days)     Procedure Component Value Units Date/Time    Blood culture [[id number]] Collected:  04/04/18 0023    Order Status:  Completed Specimen:  Blood Updated:  04/04/18 0915     Blood Culture, Routine No Growth to date    Blood culture [[id number]] Collected:  04/04/18 0007    Order Status:  Completed Specimen:  Blood Updated:  04/04/18 0915     Blood Culture, Routine No Growth to date    Blood culture [[id number]]     Order Status:  Canceled Specimen:  Blood     Blood culture [[id number]]     Order Status:  Canceled Specimen:  Blood     Urine culture  [[id number]] Collected:  04/02/18 0809    Order Status:  Completed Specimen:  Urine Updated:  04/03/18 0929     Urine Culture, Routine No significant growth    Narrative:       Preferred Collection Type->Urine, Clean Catch          I have reviewed all pertinent labs within the past 24 hours.    Diagnostic Results:  Chest X-Ray: bilateral patchy infiltrates worse in the bases; elevated left hemidiaphragm     ECG: No peaked T waves or prolonged intervals        Assessment/Plan:     * CARROLL (acute kidney injury) superimposed on CKD stage 3     Acute on stage 3 CKD.  Likely multifactorial - CARROLL vs tumor lysis vs tacrolimus toxicity.  FENa consistent with pre-renal etiology.    Continue to monitor.  Renally dose medications as needed.          Liver failure    Pt with worsening alk phos, total bili, and AST in a cholestatic pattern.  Has multiple hypoechoic liver lesions that are concerning for malignancy.  Ammonia 41.  RUQ US with multiple liver lesions and no biliary obstruction or vascular thrombosis.  Total bili 29 with direct bili >14.  Lactate elevated at >12 related to liver failure with less concern for sepsis.  Initial CT abd with new liver lesions, splenomegaly, and abdominal lymphadenopathy.  INR 1.9 today.  Hepatology following and appreciate recommendations.     Continue with supportive care.  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  IR consulted for liver biopsy--this can be considered in 4 days after the ASA has been on hold.  Heme/onc following and will dose with Solu-Medrol 80mg daily for 5 days (unable to tolerate po Prednisone 100mg daily due to nausea).  Will avoid hepatotoxic medications.  Currently receiving Kayexalate for hyerkalemia and having appropriate bowel movements.  Vitamin K 5mg daily for coagulopathy.  Ursodiol for hyperbilirubinemia.               PTLD (post-transplant lymphoproliferative disorder)    Has a hx of lymphoma in 2016  presenting with back pain.  She received 6 cycles of R-CHOP and tolerated it well.  Post treatment scans up until this point have been negative for recurrence.  Over the last 2 months, has had a decrease in her cell lines.  Was hospitalized on 3/27/2018 for symptomatic anemia.  Work-up for the anemia was negative except for an LDH of 1200.  Now presents with worsening pain and CT scan of the abdomen with splenomegaly, multiple new liver lesions, and abdominal lymphadenopathy.  Heme/onc following for suspected recurrence of her disease.    Unable to get biopsy at this time due to patients clinical stability and recent ASA use.  Will start high dose steroids with Solu-Medrol 80mg daily for 5 days per heme/onc recs.  Continue with supportive care.        Tumor lysis syndrome    Noted to have elevated uric acid level at 11 on admission.  Started on allopurinol and given a one time dose of Rasburicase.  Uric acid now 2.      Continue supportive care.  CXR with increasing pulmonary edema--will be cautious with further IVF.          Metabolic encephalopathy    Related to liver failure.  Ammonia 41.  Having bowel movements with Kayexalate.  Mentation improved today.  CT head without acute changes.    Continue to monitor.          Hyperkalemia    Likely related to hemolysis, tumor lysis syndrome, and acidosis from liver failure.  Eventually responded to multiple doses of insulin and D50, 3 amps of bicarb, and IVF.  On Kayexalate with appropriate bowel movements.  EKG without acute changes.    Will space out labs to q6 hours overnight.  Continue to shift potassium as needed.        Anemia    Has had a down trending hemoglobin for the last 2 months.  Was admitted for symptomatic anemia on 3/27/2018.  Work up for anemia at that time was negative except for elevated LDH.  Presented this admission with stable Hgb and initial down trend likely related to volume resuscitation.  Overnight, patterns seem more consistent with a  hemolytic anemia.    Hematology following.  Orders pending.  Will follow CBC q6 hours.  Currently transfused 2u RBCs.            Lung replaced by transplant    BLT in 4/2012.  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  History of A2 rejection in 2013, most recent bronchoscopy on 1/23 without evidence of rejection, BAL cx wnl.      Continue current outpatient immunosuppression and ppx. Will hold Bactrim and Prograf due to CARROLL.         Immunosuppression    Outpatient regimen - tacrolimus 1.5 mg BID, prednisone 5 mg daily.      Tacrolimus on hold for CARROLL and hyperkalemia.  Currently getting 5 days high dose Solu-Medrol 80mg for suspected lymphoma.         Prophylactic antibiotic    Continue outpatient regimen with bactrim DS TIW. Will hold Bactrim until CARROLL resolution.         Back pain    Likely multifactorial - Patient is s/p RCHOP ending in 2016, known vertebral lytic lesion and stable L4 compression fracture. Outpatient meds include tramadol 50mg and norco 5/325.     Heme/onc following, suspicious for lymphoma recurrence.     Continue supportive care with pain meds.         Liver lesion    Hepatology following, appreciate recs. Discussed imaging with IR. Recommend moving forward with FNA without contrasted CT imaging due to underlying CARROLL of unknown etiology.     IR biopsy on hold until ASA has been off for 5 days.  Will re-address next week.                Preventive Measures: Nutrition: Goal: diet as tolerated, Stress Ulcer: continue prophyllaxis, DVT: discontinue prophyllaxis    Counseling/Consultation:I discussed the case with Dr. Nieves.    Critical Care Time greater than: 2 Hours     Shavon Batres DO  Lung Transplant  [facility]

## 2018-04-04 NOTE — CONSULTS
[facility]  Nephrology  Consult Note    Patient Name: Lena Lynn  MRN: [id number]  Admission Date: 4/2/2018  Hospital Length of Stay: 2 days  Attending Provider: Erick Nieves MD   Primary Care Physician: Erick Nieves MD  Principal Problem:CARROLL (acute kidney injury)    Inpatient consult to Nephrology  Consult performed by: KATHIA DOWNING  Consult ordered by: MITCHEL GUY  Reason for consult: carroll        Subjective:     HPI: 61 yo AAF with significant history for sp lung transplant 2012 secondary to hypersensitivity pneumonitis, post transplant lymphoproliferative disorder sp RCHOP 2016, and known vertebral lytic lesion/ L4 compression who presented to hospital for right sided pain with nausea and vomiting x 1 week also found to have elevated liver enzymes. US abd showed hepatomegaly with innumerable complex predominantly hepatic lesions worriosome for metastatic disease with no biliary ductal dilation. Hepatology recommend liver biopsy.     4/4 CT head no acute intracranial abnormality. Worsen hypoxia and fever then transferred to ICU on 4 L NC on 4/4. ABG     Heme/Onc following for possible recurrent lymphoma/TLS. Hgb 9.6>4.4-received 2 units PRBC today -concern for hemolysis. Patient received Rasburicase on 4/2 concern for TLS. UA 11.9>2.2.     Nephrology consult for CARROLL superimposed on CKD stage 3, AGMA, and hyperkalemia. Baseline Cr 1.2-1.3. On admit, Cr 2.0 that improved slight 1.3>1.7 then bump Cr 1.7 yesterday and today Cr 2.2 on day of consult. On admit, K 5.1 then bump next day K 7.3 and remains elevated despite shift d50/insulin/kaexyalate. This am, K 5.9 6m. Acidosis also improved after bicarb 150 meq given this am.     3/14/18 decreased corticmedullary distinction with areas of cortical thinning and left renal simple cyst.     Since admission, multiple episodes of hypotension daily.     Tacrolimus level 3.2 on 4/3.      No hx of NSAIDs or antibiotics PTA.          Past Medical History:   Diagnosis Date    Acute rejection of lung transplant 12/12/2013    CARROLL (acute kidney injury)     Anemia 8/2/2016    Anxiety     Back pain 2016    Recently seen in ED for back pain    Cirrhosis     Depression     Hyperlipidemia     Hypertension     Lung transplant complication 1/10/2014    Lymphoma 8/10/2016    Obesity     DOROTHY (obstructive sleep apnea)     Osteoporosis     Postinflammatory pulmonary fibrosis        Past Surgical History:   Procedure Laterality Date    APPENDECTOMY      Removed at the time of hysterectomy    HYSTERECTOMY      LUNG TRANSPLANT Bilateral 04/25/2012       Review of patient's allergies indicates:  No Known Allergies  Current Facility-Administered Medications   Medication Frequency    0.9%  NaCl infusion (for blood administration) Q24H PRN    allopurinol tablet 100 mg Daily    ceFEPIme injection 2 g Q12H    citalopram tablet 20 mg Daily    dextrose 50% injection 25 g PRN    dextrose 50% injection 25 g PRN    dextrose 50% injection 25 g PRN    dextrose 50% injection 25 g PRN    diphenhydrAMINE capsule 50 mg Nightly PRN    enoxaparin injection 30 mg Daily    magnesium oxide tablet 400 mg BID    metronidazole IVPB 500 mg Q8H    pantoprazole EC tablet 40 mg Daily    predniSONE tablet 5 mg Daily     Family History     Problem Relation (Age of Onset)    Cancer Father, Brother, Maternal Uncle, Paternal Uncle    Colon cancer Father    Heart attack Paternal Aunt    Heart disease Mother    Heart failure Mother    Hypertension Father, Sister, Sister, Sister    No Known Problems Daughter, Son, Maternal Grandmother, Maternal Grandfather, Paternal Grandmother, Paternal Grandfather, Paternal Aunt, Paternal Aunt    Other Father        Social History Main Topics    Smoking status: Never Smoker    Smokeless tobacco: Never Used    Alcohol use No    Drug use: No    Sexual activity: Not Currently     Review of Systems   Unable to perform ROS:  Mental status change   Constitutional: Positive for fatigue. Negative for diaphoresis and fever.   HENT: Negative.    Eyes: Negative.    Respiratory: Positive for cough and shortness of breath.    Cardiovascular: Negative for chest pain, palpitations and leg swelling.   Gastrointestinal: Abdominal pain: right side.   Endocrine: Negative.    Genitourinary: Negative.    Musculoskeletal: Positive for back pain.   Neurological: Positive for weakness.     Objective:     Vital Signs (Most Recent):  Temp: (!) 101.4 °F (38.6 °C) (04/04/18 0700)  Pulse: 107 (04/04/18 0740)  Resp: (!) 36 (04/04/18 0740)  BP: (!) 91/53 (04/04/18 0545)  SpO2: (!) 93 % (04/04/18 0740)  O2 Device (Oxygen Therapy): nasal cannula (04/04/18 0740) Vital Signs (24h Range):  Temp:  [98.3 °F (36.8 °C)-101.5 °F (38.6 °C)] 101.4 °F (38.6 °C)  Pulse:  [100-119] 107  Resp:  [18-44] 36  SpO2:  [86 %-99 %] 93 %  BP: ()/(53-74) 91/53     Weight: 84.5 kg (186 lb 4.6 oz) (04/03/18 0400)  Body mass index is 28.33 kg/m².  Body surface area is 2.01 meters squared.    I/O last 3 completed shifts:  In: 2675 [P.O.:845; I.V.:580; IV Piggyback:1250]  Out: 650 [Urine:650]    Physical Exam   Constitutional: She appears well-developed and well-nourished.   HENT:   Head: Atraumatic.   Eyes: Pupils are equal, round, and reactive to light. Scleral icterus is present.   Neck: Neck supple.   Cardiovascular: Tachycardia present.    Pulmonary/Chest:   Inspiratory crackles mid to lower . Difficult exam due to mental status and fatigue.   Abdominal: Soft. Bowel sounds are normal. She exhibits no distension.   Musculoskeletal: She exhibits no edema or deformity.   Neurological: She is alert.   Oriented to self only.    Skin: Skin is warm and dry.   Psychiatric:   Confuse.        Significant Labs:  All labs within the past 24 hours have been reviewed.    Significant Imaging:  Labs: Reviewed    Assessment/Plan:     * CARROLL (acute kidney injury) superimposed on CKD stage 3     CARROLL  with hyperkalemia and AGMA superimposed on CKD stage 3 due to IATN/episodes of hypotension/now severe anemia/? Sepsis-work up in progress,  pre renal- poor intake with NV x 1 week, and TLS (Rasburicase on 4/2 UA 11.9 >2.1)  -US kidneys from last month showed CKD.   -UA protein 1 +. Amina 105.   -Baseline Cr 1.2-1.3  -K 5.6 after shift and kaexalate today. 2 large BM now with diarrhea. Acidosis also improving after bicarb this am.   -VBG pH 7.39 Mixed respiratory alkalosis with metabolic acidosis.   -Difficult to assess UOP due to now diarrhea. Blood pressure remains on normal low side. Hopefully improves as anemia improves.  Patient continues to have loose stool after Kaexylate this am.  -Strict IO and serial RFP. No indication for RRT at this time. Hold further bicarb at this time.  -Obtain UPRC, G6PD               Thank you for your consult. I will follow-up with patient. Please contact us if you have any additional questions.    Yolanda Martinez, JOSEF  Nephrology  [facility]      I have reviewed and concur with the NP's history, physical, assessment, and plan. I have personally interviewed and examined the patient at bedside

## 2018-04-04 NOTE — ASSESSMENT & PLAN NOTE
Has a hx of lymphoma in 2016 presenting with back pain.  She received 6 cycles of R-CHOP and tolerated it well.  Post treatment scans up until this point have been negative for recurrence.  Over the last 2 months, has had a decrease in her cell lines.  Was hospitalized on 3/27/2018 for symptomatic anemia.  Work-up for the anemia was negative except for an LDH of 1200.  Now presents with worsening pain and CT scan of the abdomen with splenomegaly, multiple new liver lesions, and abdominal lymphadenopathy.  Heme/onc following for suspected recurrence of her disease.    Unable to get biopsy at this time due to patients clinical stability and recent ASA use.  Will start high dose steroids with Solu-Medrol 80mg daily for 5 days per heme/onc recs.  Continue with supportive care.

## 2018-04-04 NOTE — ASSESSMENT & PLAN NOTE
Pt with worsening alk phos, total bili, and AST in a cholestatic pattern.  Has multiple hypoechoic liver lesions that are concerning for malignancy.  Ammonia 41.  RUQ US with multiple liver lesions and no biliary obstruction or vascular thrombosis.  Total bili 29 with direct bili >14.  Lactate elevated at >12 related to liver failure with less concern for sepsis.  Initial CT abd with new liver lesions, splenomegaly, and abdominal lymphadenopathy.  INR 1.9 today.  Hepatology following and appreciate recommendations.     Continue with supportive care.  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  IR consulted for liver biopsy--this can be considered in 4 days after the ASA has been on hold.  Heme/onc following and will dose with Solu-Medrol 80mg daily for 5 days (unable to tolerate po Prednisone 100mg daily due to nausea).  Will avoid hepatotoxic medications.  Currently receiving Kayexalate for hyerkalemia and having appropriate bowel movements.  Vitamin K 5mg daily for coagulopathy.  Ursodiol for hyperbilirubinemia.

## 2018-04-04 NOTE — ASSESSMENT & PLAN NOTE
Noted to have elevated uric acid level at 11 on admission.  Started on allopurinol and given a one time dose of Rasburicase.  Uric acid now 2.      Continue supportive care.  CXR with increasing pulmonary edema--will be cautious with further IVF.

## 2018-04-04 NOTE — NURSING
Pt ransferred to icu via bed with telemetry and o2 4liters accompanied by rn. Report given to joseph walker prior to transfer. vanc infusing and flagyl and  kayexalate due next and sent with pt. Critical labs k+ 7.6 and h&h 5.9 and 19 reported to md prior to transfer.  with pt with belongings.

## 2018-04-04 NOTE — ASSESSMENT & PLAN NOTE
Hepatology following, appreciate recs. Discussed imaging with IR. Recommend moving forward with FNA without contrasted CT imaging due to underlying CARROLL of unknown etiology.     IR biopsy on hold until ASA has been off for 5 days.  Will re-address next week.

## 2018-04-05 PROBLEM — Z51.5 PALLIATIVE CARE ENCOUNTER: Status: ACTIVE | Noted: 2018-01-01

## 2018-04-05 NOTE — ASSESSMENT & PLAN NOTE
Likely related to hemolysis, tumor lysis syndrome, and acidosis from liver failure.  Eventually responded to multiple doses of insulin and D50, 3 amps of bicarb, and IVF.  On Kayexalate with appropriate bowel movements.  EKG without acute changes.    Will space out labs to q6 hours overnight.  Continue to shift potassium as needed. K stable at 5.6 today.

## 2018-04-05 NOTE — SUBJECTIVE & OBJECTIVE
Interval History: Patient seen today by palliative care to discuss goals of care. Patient is difficult to arouse. Family was in the room, moved to outside conference room with patient's children, grandchildren and family friends. Discussion with them about goals of care.     Past Medical History:   Diagnosis Date    Acute rejection of lung transplant 12/12/2013    CARROLL (acute kidney injury)     Anemia 8/2/2016    Anxiety     Back pain 2016    Recently seen in ED for back pain    Cirrhosis     Depression     Hyperlipidemia     Hypertension     Lung transplant complication 1/10/2014    Lymphoma 8/10/2016    Obesity     DOROTHY (obstructive sleep apnea)     Osteoporosis     Postinflammatory pulmonary fibrosis        Past Surgical History:   Procedure Laterality Date    APPENDECTOMY      Removed at the time of hysterectomy    HYSTERECTOMY      LUNG TRANSPLANT Bilateral 04/25/2012       Review of patient's allergies indicates:  No Known Allergies    Medications:  Continuous Infusions:  Scheduled Meds:   allopurinol  100 mg Oral Daily    ceFEPime (MAXIPIME) IVPB  2 g Intravenous Q12H    chlorothiazide (DIURIL) IVPB  500 mg Intravenous Once    citalopram  20 mg Oral Daily    folic acid  1 mg Oral Daily    furosemide  160 mg Intravenous Once    methylPREDNISolone sodium succinate  80 mg Intravenous Daily    metronidazole  500 mg Intravenous Q8H    pantoprozole (PROTONIX) IV  40 mg Intravenous Daily    phytonadione  5 mg Oral Daily    ursodiol  300 mg Oral BID     PRN Meds:sodium chloride, dextrose 50%, dextrose 50%, diphenhydrAMINE, glucagon (human recombinant), insulin aspart U-100, morphine    Family History     Problem Relation (Age of Onset)    Cancer Father, Brother, Maternal Uncle, Paternal Uncle    Colon cancer Father    Heart attack Paternal Aunt    Heart disease Mother    Heart failure Mother    Hypertension Father, Sister, Sister, Sister    No Known Problems Daughter, Son, Maternal  Grandmother, Maternal Grandfather, Paternal Grandmother, Paternal Grandfather, Paternal Aunt, Paternal Aunt    Other Father        Social History Main Topics    Smoking status: Never Smoker    Smokeless tobacco: Never Used    Alcohol use No    Drug use: No    Sexual activity: Not Currently       Review of Systems   Unable to perform ROS: Acuity of condition     Objective:     Vital Signs (Most Recent):  Temp: 98.3 °F (36.8 °C) (04/05/18 1100)  Pulse: 106 (04/05/18 1315)  Resp: (!) 58 (04/05/18 1315)  BP: (!) 104/57 (04/05/18 1300)  SpO2: (!) 92 % (04/05/18 1315) Vital Signs (24h Range):  Temp:  [98 °F (36.7 °C)-99 °F (37.2 °C)] 98.3 °F (36.8 °C)  Pulse:  [] 106  Resp:  [23-71] 58  SpO2:  [89 %-100 %] 92 %  BP: ()/(53-76) 104/57     Weight: 84.6 kg (186 lb 8.2 oz)  Body mass index is 28.37 kg/m².    Review of Symptoms  Symptom Assessment (ESAS 0-10 scale)   ESAS 0 1 2 3 4 5 6 7 8 9 10   Pain x             Dyspnea x             Anxiety x             Nausea x             Depression  x             Anorexia x             Fatigue x             Insomnia x             Restlessness  x             Agitation x             CAM / Delirium _x_ --  ___+   Constipation     _x_ --  ___+   Diarrhea           x__ --  ___+  Bowel Management Plan (BMP): No    Comments:     Pain Assessment: Family history: Negative    OME in 24 hours: morphine 2mg q6h prn    Performance Status: 10    ECOG Performance Status Grade: 4 - Completely disabled    Physical Exam   Eyes: Scleral icterus is present.   Cardiovascular: Regular rhythm.    tachycardic   Pulmonary/Chest: Effort normal.   Skin:   Jaundiced     Nursing note and vitals reviewed.      Significant Labs: All pertinent labs within the past 24 hours have been reviewed.  CBC:     Recent Labs  Lab 04/05/18  0900   WBC 19.79*   HGB 7.9*   HCT 24.6*   MCV 97   PLT 84*     BMP:    Recent Labs  Lab 04/05/18  0413 04/05/18  0900   *  168* 172*     140 143   K 5.6*   5.6* 5.4*     104 105   CO2 21*  21* 18*   BUN 95*  95* 101*   CREATININE 3.3*  3.3* 3.5*   CALCIUM 8.6*  8.6* 8.8   MG 3.0*  --      LFT:  Lab Results   Component Value Date     (H) 04/05/2018    ALKPHOS 214 (H) 04/05/2018    BILITOT 49.0 (H) 04/05/2018     Albumin:   Albumin   Date Value Ref Range Status   04/05/2018 2.3 (L) 3.5 - 5.2 g/dL Final     Protein:   Total Protein   Date Value Ref Range Status   04/05/2018 5.1 (L) 6.0 - 8.4 g/dL Final     Lactic acid:   Lab Results   Component Value Date    LACTATE 9.0 (HH) 04/04/2018    LACTATE >12.0 (HH) 04/04/2018       Significant Imaging: I have reviewed all pertinent imaging results/findings within the past 24 hours.    Advanced Directives::  Living Will: No  LaPOST: No  Do Not Resuscitate Status: Yes  Medical Power of : No    Decision-Making Capacity: Family answered questions    Living Arrangements: Lives with family    Psychosocial/Cultural:  Patient's most important priorities:  Comfortable with family    Patient's biggest concerns/fears:  Not being comfortable/away from family    Previous death/end of life care history:  n/a    Patient's goals/hopes:  Comfortable with family    Spiritual:     F- Sintia and Belief:  visit    I - Importance:   visit  .  C - Community:   visit     A - Address in Care:  visit

## 2018-04-05 NOTE — PLAN OF CARE
Patient currently stable  drowsy, very little speech, follows commands  Morphine given PRN for pain  Sats reamin >92% on 4L nasal cannula, remains tachypenic  SBP low 100s  Remains tachycardic in the 100s  Palliative care team consulted today, talked with family about goals of care  Patient made DNR/ DNI per son  Discussion of possible transition to hospice  Family very tearful/ upset but understands patient prognosis  Will continue to monitor

## 2018-04-05 NOTE — PROGRESS NOTES
SW met with patient while on rounds with team in ICU.  Dr. Nieves discussed pt's current deteriorating medical status with pt and family as well as options.  SW offered emotional support and encouragement.  Pt presented awake, however was unable to answer where she was or what MD has discussed with her.  Pt continues to have good support from family who has been involved and present daily.  No discharge plans per team at this time.  SW will continue to provide psychosocial support, education, resources and assistance with all dc planning needs when appropriate. Family aware of how to contact SW.

## 2018-04-05 NOTE — ASSESSMENT & PLAN NOTE
Hepatology following, appreciate recs. Discussed imaging with IR. Will not move forward with FNA. Palliative care team following for metastatic disease.

## 2018-04-05 NOTE — ASSESSMENT & PLAN NOTE
Likely multifactorial - Patient is s/p RCHOP ending in 2016, known vertebral lytic lesion and stable L4 compression fracture. Outpatient meds include tramadol 50mg and norco 5/325.     Heme/onc following, suspicious for lymphoma recurrence.     Continue supportive care with pain meds. Pain well controlled by morphine 2 mg.

## 2018-04-05 NOTE — PROGRESS NOTES
[facility]  Nephrology  Progress Note    Patient Name: Lena Lynn  MRN: [id number]  Admission Date: 4/2/2018  Hospital Length of Stay: 3 days  Attending Provider: Erick Nieves MD   Primary Care Physician: Erick Nieves MD  Principal Problem:CARROLL (acute kidney injury)    Subjective:     HPI: 63 yo AAF with significant history for sp lung transplant 2012 secondary to hypersensitivity pneumonitis, post transplant lymphoproliferative disorder sp RCHOP 2016, and known vertebral lytic lesion/ L4 compression who presented to hospital for right sided pain with nausea and vomiting x 1 week also found to have elevated liver enzymes. US abd showed hepatomegaly with innumerable complex predominantly hepatic lesions worriosome for metastatic disease with no biliary ductal dilation. Hepatology recommend liver biopsy.     4/4 CT head no acute intracranial abnormality. Worsen hypoxia and fever then transferred to ICU on 4 L NC on 4/4. ABG     Heme/Onc following for possible recurrent lymphoma/TLS. Hgb 9.6>4.4-received 2 units PRBC today -concern for hemolysis. Patient received Rasburicase on 4/2 concern for TLS. UA 11.9>2.2.     Nephrology consult for CARROLL superimposed on CKD stage 3, AGMA, and hyperkalemia. Baseline Cr 1.2-1.3. On admit, Cr 2.0 that improved slight 1.3>1.7 then bump Cr 1.7 yesterday and today Cr 2.2 on day of consult. On admit, K 5.1 then bump next day K 7.3 and remains elevated despite shift d50/insulin/kaexyalate. This am, K 5.9 6m. Acidosis also improved after bicarb 150 meq given this am.     3/14/18 decreased corticmedullary distinction with areas of cortical thinning and left renal simple cyst.     Since admission, multiple episodes of hypotension daily.     Tacrolimus level 3.2 on 4/3.      No hx of NSAIDs or antibiotics PTA.         Interval History: Oriented to self only.  plus 1. Cr 3.3 from 2.2, K 5.4.     Review of patient's allergies indicates:  No Known  Allergies  Current Facility-Administered Medications   Medication Frequency    0.9%  NaCl infusion (for blood administration) Q24H PRN    allopurinol tablet 100 mg Daily    ceFEPIme injection 2 g Q12H    citalopram tablet 20 mg Daily    dextrose 50% injection 25 g PRN    dextrose 50% injection 25 g PRN    dextrose 50% injection 25 g PRN    diphenhydrAMINE capsule 50 mg Nightly PRN    folic acid tablet 1 mg Daily    methylPREDNISolone sodium succinate injection 80 mg Daily    metronidazole IVPB 500 mg Q8H    morphine injection 2 mg Q6H PRN    pantoprazole (PROTONIX) 40 mg in dextrose 5 % 100 mL IVPB Daily    phytonadione (vitamin K1) tablet 5 mg Daily    ursodiol capsule 300 mg BID       Objective:     Vital Signs (Most Recent):  Temp: 98.3 °F (36.8 °C) (04/05/18 1100)  Pulse: 105 (04/05/18 1100)  Resp: (!) 43 (04/05/18 1100)  BP: 104/68 (04/05/18 1100)  SpO2: (!) 91 % (04/05/18 1100)  O2 Device (Oxygen Therapy): nasal cannula (04/05/18 1100) Vital Signs (24h Range):  Temp:  [98 °F (36.7 °C)-99 °F (37.2 °C)] 98.3 °F (36.8 °C)  Pulse:  [] 105  Resp:  [23-55] 43  SpO2:  [89 %-100 %] 91 %  BP: ()/(53-76) 104/68     Weight: 84.6 kg (186 lb 8.2 oz) (04/05/18 0400)  Body mass index is 28.37 kg/m².  Body surface area is 2.01 meters squared.    I/O last 3 completed shifts:  In: 2993.2 [P.O.:120; I.V.:894; Blood:729.2; IV Piggyback:1250]  Out: 623 [Urine:623]    Physical Exam   Constitutional: She appears well-developed and well-nourished.   HENT:   Head: Atraumatic.   Eyes: Pupils are equal, round, and reactive to light. Scleral icterus is present.   Neck: Neck supple.   Cardiovascular: Tachycardia present.    Pulmonary/Chest:   Inspiratory crackles mid to lower . Difficult exam due to mental status and fatigue.   Abdominal: Soft. Bowel sounds are normal. She exhibits no distension.   Musculoskeletal: She exhibits no edema or deformity.   Neurological: She is alert.   Oriented to self only.     Skin: Skin is warm and dry.   Jaundice.    Psychiatric:   Confuse.        Significant Labs:  All labs within the past 24 hours have been reviewed.     Significant Imaging:  Labs: Reviewed    Assessment/Plan:     * CARROLL (acute kidney injury) superimposed on CKD stage 3     CARROLL with hyperkalemia and AGMA superimposed on CKD stage 3 due to IATN/episodes of hypotension/now severe anemia/? Sepsis-work up in progress,  pre renal- poor intake with NV x 1 week, and TLS (Rasburicase on 4/2 UA 11.9 >2.1).   -US kidneys from last month showed CKD. Baseline Cr 1.2-1.3  -UA protein 1 +. Amina 105. UPRC 0.84  -Renal functions continues to decline and  ml plus 1  -Strict IO and serial RFP. No indication for RRT at this time. Hold further bicarb at this time.  -G6PD pending .   -T yessenia up 49 also concern for bile cast contributing to worsen CARROLL. Started on ursodiol.   -Recommend lasix 80 mg and diuril 250 mg IV x 1 now. Patient continues to make urine so loop diuretic will blunt NaKCl transporter to help reduce oxygen consumption, flush out debris to help with intratubular flow as well as decrease renal vascular resistance.                 Thank you for your consult. I will follow-up with patient. Please contact us if you have any additional questions.    Yolanda Martinez, JOSEF  Nephrology  [facility]      I have reviewed and concur with the NP's history, physical, assessment, and plan. I have personally interviewed and examined the patient at bedside.

## 2018-04-05 NOTE — ASSESSMENT & PLAN NOTE
Has had a down trending hemoglobin for the last 2 months.  Was admitted for symptomatic anemia on 3/27/2018.  Work up for anemia at that time was negative except for elevated LDH.  Presented this admission with stable Hgb and initial down trend likely related to volume resuscitation.  Overnight, patterns seem more consistent with a hemolytic anemia.    Hematology following.  Orders pending.  Will follow CBC q6 hours.  Patient was  transfused 2u RBCs upon transfer to ICU. H/H stable today at 7.6 and 23.4.

## 2018-04-05 NOTE — ASSESSMENT & PLAN NOTE
Has a hx of lymphoma in 2016 presenting with back pain.  She received 6 cycles of R-CHOP and tolerated it well.  Post treatment scans up until this point have been negative for recurrence.  Over the last 2 months, has had a decrease in her cell lines.  Was hospitalized on 3/27/2018 for symptomatic anemia.  Work-up for the anemia was negative except for an LDH of 1200.  Now presents with worsening pain and CT scan of the abdomen with splenomegaly, multiple new liver lesions, and abdominal lymphadenopathy.  Heme/onc following for suspected recurrence of her disease.    Unable to get biopsy at this time due to patients clinical stability and recent ASA use. Started high dose steroids with Solu-Medrol 80mg daily for 5 days per heme/onc recs.  Continue with supportive care.

## 2018-04-05 NOTE — ASSESSMENT & PLAN NOTE
BLT in 4/2012.  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  History of A2 rejection in 2013, most recent bronchoscopy on 1/23 without evidence of rejection, BAL cx wnl.      Continue current outpatient immunosuppression and ppx. Will continue to hold Bactrim and Prograf due to CARROLL.

## 2018-04-05 NOTE — PLAN OF CARE
Problem: Patient Care Overview  Goal: Plan of Care Review  Outcome: Ongoing (interventions implemented as appropriate)  Afebrile, All VSS, SpO2>93 on 4L NC. For start of shift pt was AAOx4; however, for 2300 and currently pt has been unable to answer orientation questions. She will attempt to answer one questions then repeat her response for any further questions. Pt remains following commands, equal pupil response and  strength. Labs monitored Q4hrs. UO monitored with minimal dark mitchel output (see flowsheet). Significant other at bedside throughout shift. All questions/concerns answered/addressed. Plan reviewed with pt and significant other. Will continue to monitor.

## 2018-04-05 NOTE — PROGRESS NOTES
[facility]  Lung Transplant  Progress Note - Critical Care    Patient Name: Lena Lynn  MRN: [id number]  Admission Date: 4/2/2018  Hospital Length of Stay: 3 days  Post-Operative Day: 2171  Attending Physician: Erick Nieves MD  Primary Care Provider: Erick Nieves MD     Subjective:     Interval History:   No acute events overnight. Patient with labile mental status. Patient is currently alert and oriented to self only. Pain controlled with morphine 2mg. Nephrology, heme/onc, and hepatology following. Palliative care consulted this morning. Patient is net positive 3L since admission. Patient has been afebrile today. Patient was placed on cefepime and flagyl per sepsis protocol upon arrival to ICU. WBC improving. H/H stable. K stable. NGT remains in. Currently 99% on 4L NC.        Continuous Infusions:  Scheduled Meds:   allopurinol  100 mg Oral Daily    ceFEPime (MAXIPIME) IVPB  2 g Intravenous Q12H    citalopram  20 mg Oral Daily    folic acid  1 mg Oral Daily    methylPREDNISolone sodium succinate  80 mg Intravenous Daily    metronidazole  500 mg Intravenous Q8H    pantoprozole (PROTONIX) IV  40 mg Intravenous Daily    phytonadione  5 mg Oral Daily    ursodiol  300 mg Oral BID     PRN Meds:sodium chloride, dextrose 50%, dextrose 50%, dextrose 50%, diphenhydrAMINE, morphine    Review of patient's allergies indicates:  No Known Allergies    Review of Systems   Constitutional: Positive for activity change. Negative for appetite change, chills and fever.   HENT: Negative for rhinorrhea, sinus pain and sore throat.    Eyes: Negative.    Respiratory: Positive for shortness of breath (unchanged from baseline). Negative for cough, wheezing and stridor.    Cardiovascular: Negative for chest pain and palpitations.   Gastrointestinal: Negative for abdominal pain, constipation, diarrhea, nausea and vomiting.   Genitourinary: Positive for flank pain. Negative for decreased urine  volume, difficulty urinating, dysuria, frequency, hematuria, pelvic pain and urgency.   Musculoskeletal: Positive for back pain (lower). Negative for joint swelling.   Skin: Negative for color change, pallor and rash.   Allergic/Immunologic: Positive for immunocompromised state.   Neurological: Negative for dizziness, weakness, light-headedness, numbness and headaches.   Hematological: Negative.    Psychiatric/Behavioral: Positive for confusion. Negative for agitation and behavioral problems.     Objective:   Physical Exam   Constitutional: She appears well-developed and well-nourished. No distress.   HENT:   Head: Normocephalic and atraumatic.   Mouth/Throat: No oropharyngeal exudate.   Eyes: EOM are normal. Pupils are equal, round, and reactive to light. Scleral icterus is present.   Neck: Normal range of motion. Neck supple. No tracheal deviation present.   Cardiovascular: Regular rhythm and normal heart sounds.  Exam reveals no gallop and no friction rub.    No murmur heard.  Tachycardic     Pulmonary/Chest: Effort normal. No stridor. No respiratory distress. She has no wheezes. She has no rales.   Decreased breath sounds in bilateral bases   Abdominal: Soft. Bowel sounds are normal. She exhibits mass (splenomegaly noted). She exhibits no distension. There is no tenderness. There is no guarding.   Musculoskeletal: She exhibits edema and tenderness (lower back). She exhibits no deformity.   TTP along thoracic and lumbar spine.  Normal sensation. Back ROM limited secondary to pain.    Neurological:   Drowsy but arousable to questions.  Nods off to sleep during questioning.   Skin: Skin is warm. No rash noted. She is not diaphoretic. No erythema. No pallor.   Jaundiced   Psychiatric: She has a normal mood and affect. Her behavior is normal. Judgment and thought content normal.   Nursing note and vitals reviewed.        Vital Signs (Most Recent):  Temp: 98.3 °F (36.8 °C) (04/05/18 1100)  Pulse: 107 (04/05/18  1215)  Resp: (!) 46 (04/05/18 1215)  BP: 104/64 (04/05/18 1200)  SpO2: (!) 89 % (04/05/18 1215) Vital Signs (24h Range):  Temp:  [98 °F (36.7 °C)-99 °F (37.2 °C)] 98.3 °F (36.8 °C)  Pulse:  [] 107  Resp:  [23-71] 46  SpO2:  [89 %-100 %] 89 %  BP: ()/(53-76) 104/64     Weight: 84.6 kg (186 lb 8.2 oz)  Body mass index is 28.37 kg/m².      Intake/Output Summary (Last 24 hours) at 04/05/18 1237  Last data filed at 04/05/18 1200   Gross per 24 hour   Intake              374 ml   Output              723 ml   Net             -349 ml       Ventilator Data:          Hemodynamic Parameters:       Lines/Drains:       Port A Cath Single Lumen 10/03/16 right subclavian (Active)   Accessed by: ALEX Schuler RN 4/4/2018  3:00 AM   Dressing Type Transparent 4/4/2018 11:00 AM   Dressing Status Clean;Dry;Intact 4/4/2018 11:00 AM   Dressing Intervention New dressing 4/4/2018  3:00 AM   Dressing Change Due 04/11/18 4/4/2018  3:00 AM   Line Status Blood return noted 4/4/2018 11:00 AM   Flush Performed Yes 4/4/2018 11:00 AM   Daily Line Review Performed 4/4/2018 11:00 AM   Type of Needle Adrian 4/4/2018 11:00 AM   Needle Insertion Date 04/04/18 4/4/2018  3:00 AM   Number of days: 548            Peripheral IV - Single Lumen 04/02/18 0409 Right Antecubital (Active)   Site Assessment Clean;Dry;Intact 4/4/2018 11:00 AM   Line Status Infusing 4/4/2018 11:00 AM   Dressing Status Clean;Dry;Intact 4/4/2018 11:00 AM   Dressing Intervention Dressing reinforced 4/4/2018  3:00 AM   Dressing Change Due 04/06/18 4/4/2018  3:00 AM   Site Change Due 04/06/18 4/4/2018  3:00 AM   Reason Not Rotated Not due 4/4/2018 11:00 AM   Number of days: 2            NG/OG Tube 04/04/18 0452 oRsario meyer Right nostril (Active)   Placement Check placement verified by aspirate characteristics 4/4/2018 11:00 AM   Distal Tube Length (cm) 60 4/4/2018  7:00 AM   Tolerance no signs/symptoms of discomfort 4/4/2018 11:00 AM   Securement taped to nostril center 4/4/2018  11:00 AM   Clamp Status/Tolerance unclamped 4/4/2018 11:00 AM   Suction Setting/Drainage Method low;suction at;intermittent setting 4/4/2018 11:00 AM   Insertion Site Appearance no redness, warmth, tenderness, skin breakdown, drainage 4/4/2018 11:00 AM   Drainage Yellow;Green 4/4/2018 11:00 AM   Flush/Irrigation flushed w/;no resistance met 4/4/2018 11:00 AM   Number of days: 0       Significant Labs:  CBC:    Recent Labs  Lab 04/05/18  0900   WBC 19.79*   RBC 2.55*   HGB 7.9*   HCT 24.6*   PLT 84*   MCV 97   MCH 31.0   MCHC 32.1     BMP:    Recent Labs  Lab 04/05/18 0900      K 5.4*      CO2 18*   *   CREATININE 3.5*   CALCIUM 8.8      Tacrolimus Levels:    Recent Labs  Lab 04/05/18  0413   TACROLIMUS 3.6*     Microbiology:  Microbiology Results (last 7 days)     Procedure Component Value Units Date/Time    Blood culture [[id number]] Collected:  04/04/18 0007    Order Status:  Completed Specimen:  Blood Updated:  04/05/18 0613     Blood Culture, Routine No Growth to date     Blood Culture, Routine No Growth to date    Blood culture [[id number]] Collected:  04/04/18 0023    Order Status:  Completed Specimen:  Blood Updated:  04/05/18 0613     Blood Culture, Routine No Growth to date     Blood Culture, Routine No Growth to date    Blood culture [[id number]]     Order Status:  Canceled Specimen:  Blood     Blood culture [[id number]]     Order Status:  Canceled Specimen:  Blood     Urine culture [[id number]] Collected:  04/02/18 0809    Order Status:  Completed Specimen:  Urine Updated:  04/03/18 0929     Urine Culture, Routine No significant growth    Narrative:       Preferred Collection Type->Urine, Clean Catch          I have reviewed all pertinent labs within the past 24 hours.    Diagnostic Results:  Chest X-Ray: bilateral patchy infiltrates worse in the bases; elevated left hemidiaphragm     ECG: No peaked T waves or prolonged intervals        Assessment/Plan:     * CARROLL (acute kidney injury)  superimposed on CKD stage 3     Acute on stage 3 CKD.  Likely multifactorial - CARROLL vs tumor lysis vs tacrolimus toxicity.  FENa consistent with pre-renal etiology.    Continue to monitor.  Renally dose medications as needed.          Lung replaced by transplant    BLT in 4/2012.  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  History of A2 rejection in 2013, most recent bronchoscopy on 1/23 without evidence of rejection, BAL cx wnl.      Continue current outpatient immunosuppression and ppx. Will continue to hold Bactrim and Prograf due to CARROLL.         Immunosuppression    Outpatient regimen - tacrolimus 1.5 mg BID, prednisone 5 mg daily.      Tacrolimus on hold for CARROLL and hyperkalemia.  Currently getting day 2/5 high dose Solu-Medrol 80mg for suspected lymphoma.         Prophylactic antibiotic    Continue outpatient regimen with bactrim DS TIW. Will hold Bactrim until CARROLL resolution.         Back pain    Likely multifactorial - Patient is s/p RCHOP ending in 2016, known vertebral lytic lesion and stable L4 compression fracture. Outpatient meds include tramadol 50mg and norco 5/325.     Heme/onc following, suspicious for lymphoma recurrence.     Continue supportive care with pain meds. Pain well controlled by morphine 2 mg.         Liver lesion    Hepatology following, appreciate recs. Discussed imaging with IR. Will not move forward with FNA. Palliative care team following for metastatic disease.               Anemia    Has had a down trending hemoglobin for the last 2 months.  Was admitted for symptomatic anemia on 3/27/2018.  Work up for anemia at that time was negative except for elevated LDH.  Presented this admission with stable Hgb and initial down trend likely related to volume resuscitation.  Overnight, patterns seem more consistent with a hemolytic anemia.    Hematology following.  Orders pending.  Will follow CBC q6 hours.  Patient was  transfused 2u RBCs upon transfer to ICU. H/H stable today at  7.6 and 23.4.             Liver failure    Pt with worsening alk phos, total bili, and AST in a cholestatic pattern.  Has multiple hypoechoic liver lesions that are concerning for malignancy.  Ammonia 41.  RUQ US with multiple liver lesions and no biliary obstruction or vascular thrombosis.  Total bili 29 with direct bili >14.  Lactate elevated at >12 related to liver failure with less concern for sepsis.  Initial CT abd with new liver lesions, splenomegaly, and abdominal lymphadenopathy.  INR 1.9 today.  Hepatology following and appreciate recommendations.     Continue with supportive care.  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  Spoke with IR. Recommend not moving forward with FNA biopsy due to patient's critical condition.  Continue Solu-Medrol 80mg daily for 5 days (unable to tolerate po Prednisone 100mg daily due to nausea) per heme/onc recs.  Will avoid hepatotoxic medications.  Received Kayexalate for ongoing hyerkalemia and having appropriate bowel movements. K remains stable at 5.6. Vitamin K 5mg daily for coagulopathy.  Ursodiol for hyperbilirubinemia.               PTLD (post-transplant lymphoproliferative disorder)    Has a hx of lymphoma in 2016 presenting with back pain.  She received 6 cycles of R-CHOP and tolerated it well.  Post treatment scans up until this point have been negative for recurrence.  Over the last 2 months, has had a decrease in her cell lines.  Was hospitalized on 3/27/2018 for symptomatic anemia.  Work-up for the anemia was negative except for an LDH of 1200.  Now presents with worsening pain and CT scan of the abdomen with splenomegaly, multiple new liver lesions, and abdominal lymphadenopathy.  Heme/onc following for suspected recurrence of her disease.    Unable to get biopsy at this time due to patients clinical stability and recent ASA use. Started high dose steroids with Solu-Medrol 80mg daily for 5 days per heme/onc  recs.  Continue with supportive care.        Hyperkalemia    Likely related to hemolysis, tumor lysis syndrome, and acidosis from liver failure.  Eventually responded to multiple doses of insulin and D50, 3 amps of bicarb, and IVF.  On Kayexalate with appropriate bowel movements.  EKG without acute changes.    Will space out labs to q6 hours overnight.  Continue to shift potassium as needed. K stable at 5.6 today.        Metabolic encephalopathy    Related to liver failure.  Ammonia 41.  Having bowel movements with Kayexalate.  Mentation improved today.  CT head without acute changes.    Continue to monitor mental status.         Tumor lysis syndrome    Noted to have elevated uric acid level at 11 on admission.  Started on allopurinol and given a one time dose of Rasburicase.  Uric acid today 0.7.     Continue supportive care.  CXR with increasing pulmonary edema--will be cautious with further IVF.              Tomeka Francis PA-C  Lung Transplant  [facility]

## 2018-04-05 NOTE — SUBJECTIVE & OBJECTIVE
Interval History: Oriented to self only.  plus 1. Cr 3.3 from 2.2, K 5.4.     Review of patient's allergies indicates:  No Known Allergies  Current Facility-Administered Medications   Medication Frequency    0.9%  NaCl infusion (for blood administration) Q24H PRN    allopurinol tablet 100 mg Daily    ceFEPIme injection 2 g Q12H    citalopram tablet 20 mg Daily    dextrose 50% injection 25 g PRN    dextrose 50% injection 25 g PRN    dextrose 50% injection 25 g PRN    diphenhydrAMINE capsule 50 mg Nightly PRN    folic acid tablet 1 mg Daily    methylPREDNISolone sodium succinate injection 80 mg Daily    metronidazole IVPB 500 mg Q8H    morphine injection 2 mg Q6H PRN    pantoprazole (PROTONIX) 40 mg in dextrose 5 % 100 mL IVPB Daily    phytonadione (vitamin K1) tablet 5 mg Daily    ursodiol capsule 300 mg BID       Objective:     Vital Signs (Most Recent):  Temp: 98.3 °F (36.8 °C) (04/05/18 1100)  Pulse: 105 (04/05/18 1100)  Resp: (!) 43 (04/05/18 1100)  BP: 104/68 (04/05/18 1100)  SpO2: (!) 91 % (04/05/18 1100)  O2 Device (Oxygen Therapy): nasal cannula (04/05/18 1100) Vital Signs (24h Range):  Temp:  [98 °F (36.7 °C)-99 °F (37.2 °C)] 98.3 °F (36.8 °C)  Pulse:  [] 105  Resp:  [23-55] 43  SpO2:  [89 %-100 %] 91 %  BP: ()/(53-76) 104/68     Weight: 84.6 kg (186 lb 8.2 oz) (04/05/18 0400)  Body mass index is 28.37 kg/m².  Body surface area is 2.01 meters squared.    I/O last 3 completed shifts:  In: 2993.2 [P.O.:120; I.V.:894; Blood:729.2; IV Piggyback:1250]  Out: 623 [Urine:623]    Physical Exam   Constitutional: She appears well-developed and well-nourished.   HENT:   Head: Atraumatic.   Eyes: Pupils are equal, round, and reactive to light. Scleral icterus is present.   Neck: Neck supple.   Cardiovascular: Tachycardia present.    Pulmonary/Chest:   Inspiratory crackles mid to lower . Difficult exam due to mental status and fatigue.   Abdominal: Soft. Bowel sounds are normal. She  exhibits no distension.   Musculoskeletal: She exhibits no edema or deformity.   Neurological: She is alert.   Oriented to self only.    Skin: Skin is warm and dry.   Jaundice.    Psychiatric:   Confuse.        Significant Labs:  All labs within the past 24 hours have been reviewed.     Significant Imaging:  Labs: Reviewed

## 2018-04-05 NOTE — HPI
Patient is 62 year old female with past medical history of bilateral lung transplant and lymphoma who presented to [facility] ED on 3/27 for left shoulder/arm pain. Imaging subsequently showed multiorgan lesions suspicious for metastasis. Palliative care was consulted for goals of care.

## 2018-04-05 NOTE — ASSESSMENT & PLAN NOTE
CARROLL with hyperkalemia and AGMA superimposed on CKD stage 3 due to IATN/episodes of hypotension/now severe anemia/? Sepsis-work up in progress,  pre renal- poor intake with NV x 1 week, and TLS (Rasburicase on 4/2 UA 11.9 >2.1).   -US kidneys from last month showed CKD. Baseline Cr 1.2-1.3  -UA protein 1 +. Amina 105. UPRC 0.84  -Renal functions continues to decline and  ml plus 1  -Strict IO and serial RFP. No indication for RRT at this time. Hold further bicarb at this time.  -G6PD pending .   -T yessenia up 49 also concern for bile cast contributing to worsen CARROLL. Started on ursodiol.   -Recommend lasix 80 mg and diuril 250 mg IV x 1 now. Patient continues to make urine so loop diuretic will blunt NaKCl transporter to help reduce oxygen consumption, flush out debris to help with intratubular flow as well as decrease renal vascular resistance.

## 2018-04-05 NOTE — CONSULTS
"[facility]  Palliative Medicine  Consult Note    Patient Name: Lena Lynn  MRN: [id number]  Admission Date: 4/2/2018  Hospital Length of Stay: 3 days  Code Status: DNR   Attending Provider: Erick Nieves MD  Consulting Provider: Lorraine Mccoy MD  Primary Care Physician: Erick Nieves MD  Principal Problem:Palliative care encounter    Patient information was obtained from relative(s) and ER records.      Inpatient consult to Palliative Care  Consult performed by: LORRAINE MCCOY  Consult ordered by: VASQUEZ SCHMIDT  Reason for consult: Goals of care        Assessment/Plan:     * Palliative care encounter    Discussed with Dr. Batres prior to consultation.  Patient was seen today by palliative care to discuss goals of care. Family was present including 2 sons- Fabricio, 2 daughters and other extended family members. Long discussion with family, patient unable to participate.  - explained to family the role of palliative care: understand goals of care and to help with decision making regarding the kind of care she would want.  - discussed with the family the patient's status and condition: she has a poor prognosis and is dying.  - explained that any further aggressive intervention would not cure the patient and that the focus now should be to make sure the patient is comfortable.   - family understandably sad, they wanted to know where she could/would go  - discussed with family different options including inpatient hospice-"intensive care unit for comfort", moving to floor of hospital and the benefits (increased visiting hours).  - explained that they did not have to come to a decision immediately, but   to think about discharge options.   - Recommended no CPR or mechanical ventilation if patient's heart were to stop in the hospital. They do not want her on a ventilator. They do not want to see patient suffer and are agreeable with DNR order.  - would recommend NG tube " removal and discontinuation of all oral medications: citalopram, diphenhydramine, allopurinol, folic acid/ vit k tablets, ursodiol  - consider discontinuing antibiotics/insulin   - will continue to follow. Discussed with Tomeka ZENG and Nicky DIAZ.            Thank you for your consult. I will follow-up with patient. Please contact us if you have any additional questions.    Subjective:     HPI:   Patient is 62 year old female with past medical history of bilateral lung transplant and lymphoma who presented to [facility] ED on 3/27 for left shoulder/arm pain. Imaging subsequently showed multiorgan lesions suspicious for metastasis. Palliative care was consulted for goals of care.     Hospital Course:  Patient initially arrived to ED on 3/27. Patient subsequently developed multiple organ issues including CARROLL, metabolic encephalopathy, TLS, hyperbilirubinemia.     Interval History: Patient seen today by palliative care to discuss goals of care. Patient is difficult to arouse. Family was in the room, moved to outside conference room with patient's children, grandchildren and family friends. Discussion with them about goals of care.     Past Medical History:   Diagnosis Date    Acute rejection of lung transplant 12/12/2013    CARROLL (acute kidney injury)     Anemia 8/2/2016    Anxiety     Back pain 2016    Recently seen in ED for back pain    Cirrhosis     Depression     Hyperlipidemia     Hypertension     Lung transplant complication 1/10/2014    Lymphoma 8/10/2016    Obesity     DOROTHY (obstructive sleep apnea)     Osteoporosis     Postinflammatory pulmonary fibrosis        Past Surgical History:   Procedure Laterality Date    APPENDECTOMY      Removed at the time of hysterectomy    HYSTERECTOMY      LUNG TRANSPLANT Bilateral 04/25/2012       Review of patient's allergies indicates:  No Known Allergies    Medications:  Continuous Infusions:  Scheduled Meds:   allopurinol  100 mg Oral Daily    ceFEPime  (MAXIPIME) IVPB  2 g Intravenous Q12H    chlorothiazide (DIURIL) IVPB  500 mg Intravenous Once    citalopram  20 mg Oral Daily    folic acid  1 mg Oral Daily    furosemide  160 mg Intravenous Once    methylPREDNISolone sodium succinate  80 mg Intravenous Daily    metronidazole  500 mg Intravenous Q8H    pantoprozole (PROTONIX) IV  40 mg Intravenous Daily    phytonadione  5 mg Oral Daily    ursodiol  300 mg Oral BID     PRN Meds:sodium chloride, dextrose 50%, dextrose 50%, diphenhydrAMINE, glucagon (human recombinant), insulin aspart U-100, morphine    Family History     Problem Relation (Age of Onset)    Cancer Father, Brother, Maternal Uncle, Paternal Uncle    Colon cancer Father    Heart attack Paternal Aunt    Heart disease Mother    Heart failure Mother    Hypertension Father, Sister, Sister, Sister    No Known Problems Daughter, Son, Maternal Grandmother, Maternal Grandfather, Paternal Grandmother, Paternal Grandfather, Paternal Aunt, Paternal Aunt    Other Father        Social History Main Topics    Smoking status: Never Smoker    Smokeless tobacco: Never Used    Alcohol use No    Drug use: No    Sexual activity: Not Currently       Review of Systems   Unable to perform ROS: Acuity of condition     Objective:     Vital Signs (Most Recent):  Temp: 98.3 °F (36.8 °C) (04/05/18 1100)  Pulse: 106 (04/05/18 1315)  Resp: (!) 58 (04/05/18 1315)  BP: (!) 104/57 (04/05/18 1300)  SpO2: (!) 92 % (04/05/18 1315) Vital Signs (24h Range):  Temp:  [98 °F (36.7 °C)-99 °F (37.2 °C)] 98.3 °F (36.8 °C)  Pulse:  [] 106  Resp:  [23-71] 58  SpO2:  [89 %-100 %] 92 %  BP: ()/(53-76) 104/57     Weight: 84.6 kg (186 lb 8.2 oz)  Body mass index is 28.37 kg/m².    Review of Symptoms  Symptom Assessment (ESAS 0-10 scale)   ESAS 0 1 2 3 4 5 6 7 8 9 10   Pain x             Dyspnea x             Anxiety x             Nausea x             Depression  x             Anorexia x             Fatigue x             Insomnia  x             Restlessness  x             Agitation x             CAM / Delirium _x_ --  ___+   Constipation     _x_ --  ___+   Diarrhea           x__ --  ___+  Bowel Management Plan (BMP): No    Comments:     Pain Assessment: Family history: Negative    OME in 24 hours: morphine 2mg q6h prn    Performance Status: 10    ECOG Performance Status Grade: 4 - Completely disabled    Physical Exam   Eyes: Scleral icterus is present.   Cardiovascular: Regular rhythm.    tachycardic   Pulmonary/Chest: Effort normal.   Skin:   Jaundiced     Nursing note and vitals reviewed.      Significant Labs: All pertinent labs within the past 24 hours have been reviewed.  CBC:     Recent Labs  Lab 04/05/18  0900   WBC 19.79*   HGB 7.9*   HCT 24.6*   MCV 97   PLT 84*     BMP:    Recent Labs  Lab 04/05/18  0413 04/05/18  0900   *  168* 172*     140 143   K 5.6*  5.6* 5.4*     104 105   CO2 21*  21* 18*   BUN 95*  95* 101*   CREATININE 3.3*  3.3* 3.5*   CALCIUM 8.6*  8.6* 8.8   MG 3.0*  --      LFT:  Lab Results   Component Value Date     (H) 04/05/2018    ALKPHOS 214 (H) 04/05/2018    BILITOT 49.0 (H) 04/05/2018     Albumin:   Albumin   Date Value Ref Range Status   04/05/2018 2.3 (L) 3.5 - 5.2 g/dL Final     Protein:   Total Protein   Date Value Ref Range Status   04/05/2018 5.1 (L) 6.0 - 8.4 g/dL Final     Lactic acid:   Lab Results   Component Value Date    LACTATE 9.0 (HH) 04/04/2018    LACTATE >12.0 (HH) 04/04/2018       Significant Imaging: I have reviewed all pertinent imaging results/findings within the past 24 hours.    Advanced Directives::  Living Will: No  LaPOST: No  Do Not Resuscitate Status: Yes  Medical Power of : No    Decision-Making Capacity: Family answered questions    Living Arrangements: Lives with family    Psychosocial/Cultural:  Patient's most important priorities:  Comfortable with family    Patient's biggest concerns/fears:  Not being comfortable/away from  family    Previous death/end of life care history:  n/a    Patient's goals/hopes:  Comfortable with family    Spiritual:     F- Sintia and Belief:  visit    I - Importance:   visit  .  C - Community:   visit     A - Address in Care:  visit      > 50% of 75 min visit spent in chart review, face to face discussion of goals of care,  symptom assessment, coordination of care and emotional support.    Lorraine Mccoy MD  Palliative Medicine  [facility]  [phone] cell

## 2018-04-05 NOTE — ASSESSMENT & PLAN NOTE
Discussed with   Patient was seen today by palliative care to discuss goals of care. Family was present. Long discussion with family, patient unable to participate.  - explained to family the role of palliative care: to keep patient/family comfortable  - discussed with the family the patient's status and condition: she has a poor prognosis and is dying.  - explained that any further aggressive intervention would not cure the patient and that the focus now is to make sure the patient is comfortable.   - family understandably sad, they wanted to know where she could/would go  - discussed with family different options including inpatient hospice, moving to floor of hospital and the benefits (increased visiting hours).  - explained that they did not have to come to a decision immediately, and to think about where they'd like to move the patient.   - asked the family if patient's heart would stop, if they wanted any intervention (DNR), they do not want to see patient suffer and are ok with DNR  - would recommend NG tube removal and discontinuation of all oral medications: citalopram, diphenhydramine, allopurinol, folic acid/ vit k tablets, ursodiol  - consider discontinuing antibiotics/insulin   - will continue to follow

## 2018-04-05 NOTE — PLAN OF CARE
Problem: Patient Care Overview  Goal: Plan of Care Review  Plan of care reviewed with patient and family. Patient remains on 4L of [place], maintaining sats above 90. Patient mental status has improved from confused and only oriented to name to AAOx4. Patient stated pain was better controlled with current regiment of PRN medication. Labs monitored Q4. Inserted a smith for urinary retention. Patient positioned Q2 of more frequently to maintain skin integrity. Pressure points cushioned. Family at bedside throughout the afternoon into the evening discussing plan of care. All questions answered by nursing staff. Vital signs stable at this time will continue to monitor.

## 2018-04-05 NOTE — CARE UPDATE
Pending palliative care measures, placed poct glucose checks q6h, moderate correction scale. ** Please call Endocrine for any BG related issues **  Damion Duggan, NP  [id number]  Consult not needed at this time.

## 2018-04-05 NOTE — ASSESSMENT & PLAN NOTE
Noted to have elevated uric acid level at 11 on admission.  Started on allopurinol and given a one time dose of Rasburicase.  Uric acid today 0.7.     Continue supportive care.  CXR with increasing pulmonary edema--will be cautious with further IVF.

## 2018-04-05 NOTE — ASSESSMENT & PLAN NOTE
Pt with worsening alk phos, total bili, and AST in a cholestatic pattern.  Has multiple hypoechoic liver lesions that are concerning for malignancy.  Ammonia 41.  RUQ US with multiple liver lesions and no biliary obstruction or vascular thrombosis.  Total bili 29 with direct bili >14.  Lactate elevated at >12 related to liver failure with less concern for sepsis.  Initial CT abd with new liver lesions, splenomegaly, and abdominal lymphadenopathy.  INR 1.9 today.  Hepatology following and appreciate recommendations.     Continue with supportive care.  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  Spoke with IR. Recommend not moving forward with FNA biopsy due to patient's critical condition.  Continue Solu-Medrol 80mg daily for 5 days (unable to tolerate po Prednisone 100mg daily due to nausea) per heme/onc recs.  Will avoid hepatotoxic medications.  Received Kayexalate for ongoing hyerkalemia and having appropriate bowel movements. K remains stable at 5.6. Vitamin K 5mg daily for coagulopathy.  Ursodiol for hyperbilirubinemia.

## 2018-04-05 NOTE — PROGRESS NOTES
[facility]  Hematology/Oncology  Progress Note    Patient Name: Lena Lynn  Admission Date: 4/2/2018  Hospital Length of Stay: 3 days  Code Status: Full Code     Subjective:     Interval History:     - Remains afebrile since 7 am yesterday. Tachypenic, remains on 4L O2 via NC. Otherwise maintaining hemodynamics.   - During the daytime, there was some improvement in mentation, but overnight noted to be disoriented again.  - Received 2 units of PRBC yesterday.     Medications:  Continuous Infusions:  Scheduled Meds:   allopurinol  100 mg Oral Daily    ceFEPime (MAXIPIME) IVPB  2 g Intravenous Q12H    citalopram  20 mg Oral Daily    folic acid  1 mg Oral Daily    magnesium oxide  400 mg Oral BID    metronidazole  500 mg Intravenous Q8H    pantoprazole  40 mg Oral Daily    phytonadione  5 mg Oral Daily    ursodiol  300 mg Oral BID     PRN Meds:sodium chloride, dextrose 50%, dextrose 50%, dextrose 50%, diphenhydrAMINE, morphine     Review of Systems   Constitutional: Positive for fatigue. Negative for fever.   Respiratory: Positive for shortness of breath. Negative for cough.    Gastrointestinal: Positive for abdominal distention, abdominal pain and nausea.   Musculoskeletal: Positive for back pain.   Skin: Positive for color change.   Neurological: Positive for weakness.   Psychiatric/Behavioral: Positive for confusion.     Objective:     Vital Signs (Most Recent):  Temp: 98.7 °F (37.1 °C) (04/05/18 0300)  Pulse: 100 (04/05/18 0500)  Resp: (!) 33 (04/05/18 0500)  BP: 103/65 (04/05/18 0500)  SpO2: 96 % (04/05/18 0500) Vital Signs (24h Range):  Temp:  [98 °F (36.7 °C)-101.4 °F (38.6 °C)] 98.7 °F (37.1 °C)  Pulse:  [] 100  Resp:  [23-55] 33  SpO2:  [90 %-100 %] 96 %  BP: ()/(53-76) 103/65     Weight: 84.6 kg (186 lb 8.2 oz)  Body mass index is 28.37 kg/m².  Body surface area is 2.01 meters squared.      Intake/Output Summary (Last 24 hours) at 04/05/18 0571  Last data filed at  04/05/18 0300   Gross per 24 hour   Intake          1309.17 ml   Output              550 ml   Net           759.17 ml       Physical Exam   Constitutional:   Appears weak and lethargic.    HENT:   Head: Normocephalic and atraumatic.   Mouth/Throat: Oropharynx is clear and moist.   NGT   Eyes: Scleral icterus is present.   Neck: Neck supple.   Cardiovascular: Regular rhythm.    Tachycardic   Pulmonary/Chest: Effort normal.   Diminished breath sounds at base   Abdominal: Soft. She exhibits distension.   Diffuse mild tenderness.    Neurological:   Awake, Oriented to person only today.    Skin: Skin is warm and dry.       Significant Labs:   CBC:     Recent Labs  Lab 04/04/18 2015 04/05/18  0003 04/05/18  0414   WBC 21.24* 20.70* 19.24*   HGB 7.8* 7.7* 7.6*   HCT 23.7* 23.5* 23.4*   PLT 92* 92* 88*   , CMP:     Recent Labs  Lab 04/04/18 2015 04/05/18  0004 04/05/18  0413    140  140 140  140   K 5.7* 5.7*  5.7* 5.6*  5.6*    104  104 104  104   CO2 23 24  24 21*  21*   * 174*  174* 168*  168*   BUN 83* 90*  90* 95*  95*   CREATININE 2.9* 3.2*  3.2* 3.3*  3.3*   CALCIUM 8.6* 8.6*  8.6* 8.6*  8.6*   PROT 5.1* 5.1*  5.1* 5.1*  5.1*   ALBUMIN 2.3* 2.3*  2.3* 2.3*  2.3*   BILITOT 45.5* 47.0*  47.0* 46.7*  46.7*   ALKPHOS 208* 205*  205* 206*  206*   * 315*  315* 263*  263*   ALT 24 23  23 22  22   ANIONGAP 13 12  12 15  15   EGFRNONAA 16.8* 14.9*  14.9* 14.4*  14.4*   , Coagulation:     Recent Labs  Lab 04/04/18  0254   INR 1.9*   APTT 23.7   , Haptoglobin:     Recent Labs  Lab 04/04/18  0549   HAPTOGLOBIN <10*   , LDH: No results for input(s): LDHCSF, BFSOURCE in the last 48 hours., Reticulocytes: No results for input(s): RETIC in the last 48 hours. and Uric Acid     Recent Labs  Lab 04/03/18 2057 04/04/18  0251 04/05/18  0413   URICACID 2.2* 2.1* 0.7*       Diagnostic Results:    US of Abdomen:    Splenomegaly with innumerable hypoechoic splenic lesions worrisome  for metastatic disease.    Prominent mass lesion in the shanta hepatis, worrisome for metastatic adenopathy.    Hepatomegaly with innumerable complex predominantly hypoechoic hepatic lesions worrisome for metastatic disease.  Satisfactory Doppler evaluation of the liver, without portal vein thrombosis.    No biliary ductal dilatation.    Assessment/Plan:     Active Diagnoses:    Diagnosis Date Noted POA    PRINCIPAL PROBLEM:  CARROLL (acute kidney injury) superimposed on CKD stage 3  [N17.9] 04/02/2018 Yes    Liver failure [K72.90] 04/04/2018 Yes    PTLD (post-transplant lymphoproliferative disorder) [T86.99, D47.Z1] 04/04/2018 Yes    Hyperkalemia [E87.5] 04/04/2018 No    Metabolic encephalopathy [G93.41] 04/04/2018 No    Tumor lysis syndrome [E88.3] 04/04/2018 Yes    Liver lesion [K76.9] 04/02/2018 Yes    Prophylactic antibiotic [Z79.2] 03/26/2018 Not Applicable    Back pain [M54.9] 08/03/2016 Yes    Anemia [D64.9] 08/02/2016 Yes    Immunosuppression [D89.9] 09/06/2012 Yes    Lung replaced by transplant [Z94.2] 07/12/2012 Not Applicable      Problems Resolved During this Admission:    Diagnosis Date Noted Date Resolved POA       Multiple Hepatic/Splenic Lesions  Hx of PTLD/DLBCL  - s/p 6 cycles of R-CHOP in 2016 with post-treatment PET showing NEYMAR.   - now with worsening cytopenias, elevated LDH, and abnormal US/CT findings, concern for relapsed disease.  - awaiting tissue diagnosis, scheduled to have IR guided Liver Biopsy (delayed due to recent aspirin intake-4/3).  - likely the aggressive nature and high tumor burden of her PTLD/DLBCL contributing to her acute decompensation, TLS/CARROLL, and coincident hemolytic anemia.    - started partial treatment for suspected PTLD, recommend IV equivalent of Prednisone 100 mg daily x 5 days (Day 1 - 4/4/18).  - will await final pathology to consider subsequent treatment modalities, although clinically her condition remains guarded.  - EBV DNA, quantitative study in  process.   - monitor cbc, uric acid, and coags/fibrinogen closely.   - continue with supportive care and PRN transfusions.    Severe Anemia/Hemolytic Anemia  - peripheral smear shows spherocytes, polychromasia. No bite cells. Rare schistocytes. See atypical lymphocytes.   - harsha positive hemolysis, likely secondary to immune-mediated red blood cell destruction.  - on steroids now to address underlying cause of her hemolysis.  - on folic acid 1 mg daily.   - Hb 7.6 g/dL today.   - monitor Hb/Hct closely, transfuse PRN Hb < 7 g/dL, leukoreduced, irradiated.       TLS/CARROLL on CKD III  - uric acid on presentation was 11.9 mg/dL along with renal insufficiency, received dose of Rasburicase  On 4/2/18 given concern for TLS.    - Uric Acid improved now, on low dose allopurinol given renal insufficiency. Consider gentle hydration as tolerated.    - Monitor electrolytes, uric acid daily given concern for TLS with start of steroids.   - potassium at 5.6 today, stable. BUN/Cr: 95/3.3.   - Nephrology on board, appreciate assistance. Suspect pre-renal and evaluation ongoing.      Thrombocytopenia  - likely multifactorial due to hypersplenism from splenic involvement, ?extensive bone marrow infiltration, or immune destruction.   - fibrinogen unremarkable, less likely consumptive process.   - Platelet 88,000/mm3 today.     Coagulopathy  - likely secondary to liver involvement from PTLD/DLBCL  - start Vitamin K 5 mg x 3 doses.   - monitor coags    Hyperbilirubinemia  - likely multifactorial given hemolysis and cholestasis due to disease involvement in the liver.   - started on Ursodiol yesterday.      - avoid hepatotoxic medications.     Hx of Lung Transplant  - CMV Status: D+ / R+ (in 2012 2/2 hypersensitivity pneumonitis)  - complicated by A2 rejection in 2013, PTLD (DLBCL) diagnosed 8/12/16 s/p 6 cycles of RCHOP  - on chronic pred/tacro, tacrolimus currently on hold.      Metabolic Encephalopathy  - secondary to uremia, liver  dysfunction, ?infectious  - CT Head negative.   - repeat ammonia.     Thank you for your consult. I will follow-up with patient. Please contact us if you have any additional questions.     Piotr Spears MD  Hematology/Oncology  [facility]

## 2018-04-05 NOTE — SUBJECTIVE & OBJECTIVE
Subjective:     Interval History:   No acute events overnight. Patient with labile mental status. Patient is currently alert and oriented to self only. Pain controlled with morphine 2mg. Nephrology, heme/onc, and hepatology following. Palliative care consulted this morning. Patient is net positive 3L since admission. Patient has been afebrile today. Patient was placed on cefepime and flagyl per sepsis protocol upon arrival to ICU. WBC improving. H/H stable. K stable. NGT remains in. Currently 99% on 4L NC.        Continuous Infusions:  Scheduled Meds:   allopurinol  100 mg Oral Daily    ceFEPime (MAXIPIME) IVPB  2 g Intravenous Q12H    citalopram  20 mg Oral Daily    folic acid  1 mg Oral Daily    methylPREDNISolone sodium succinate  80 mg Intravenous Daily    metronidazole  500 mg Intravenous Q8H    pantoprozole (PROTONIX) IV  40 mg Intravenous Daily    phytonadione  5 mg Oral Daily    ursodiol  300 mg Oral BID     PRN Meds:sodium chloride, dextrose 50%, dextrose 50%, dextrose 50%, diphenhydrAMINE, morphine    Review of patient's allergies indicates:  No Known Allergies    Review of Systems   Constitutional: Positive for activity change. Negative for appetite change, chills and fever.   HENT: Negative for rhinorrhea, sinus pain and sore throat.    Eyes: Negative.    Respiratory: Positive for shortness of breath (unchanged from baseline). Negative for cough, wheezing and stridor.    Cardiovascular: Negative for chest pain and palpitations.   Gastrointestinal: Negative for abdominal pain, constipation, diarrhea, nausea and vomiting.   Genitourinary: Positive for flank pain. Negative for decreased urine volume, difficulty urinating, dysuria, frequency, hematuria, pelvic pain and urgency.   Musculoskeletal: Positive for back pain (lower). Negative for joint swelling.   Skin: Negative for color change, pallor and rash.   Allergic/Immunologic: Positive for immunocompromised state.   Neurological: Negative for  dizziness, weakness, light-headedness, numbness and headaches.   Hematological: Negative.    Psychiatric/Behavioral: Positive for confusion. Negative for agitation and behavioral problems.     Objective:   Physical Exam   Constitutional: She appears well-developed and well-nourished. No distress.   HENT:   Head: Normocephalic and atraumatic.   Mouth/Throat: No oropharyngeal exudate.   Eyes: EOM are normal. Pupils are equal, round, and reactive to light. Scleral icterus is present.   Neck: Normal range of motion. Neck supple. No tracheal deviation present.   Cardiovascular: Regular rhythm and normal heart sounds.  Exam reveals no gallop and no friction rub.    No murmur heard.  Tachycardic     Pulmonary/Chest: Effort normal. No stridor. No respiratory distress. She has no wheezes. She has no rales.   Decreased breath sounds in bilateral bases   Abdominal: Soft. Bowel sounds are normal. She exhibits mass (splenomegaly noted). She exhibits no distension. There is no tenderness. There is no guarding.   Musculoskeletal: She exhibits edema and tenderness (lower back). She exhibits no deformity.   TTP along thoracic and lumbar spine.  Normal sensation. Back ROM limited secondary to pain.    Neurological:   Drowsy but arousable to questions.  Nods off to sleep during questioning.   Skin: Skin is warm. No rash noted. She is not diaphoretic. No erythema. No pallor.   Jaundiced   Psychiatric: She has a normal mood and affect. Her behavior is normal. Judgment and thought content normal.   Nursing note and vitals reviewed.        Vital Signs (Most Recent):  Temp: 98.3 °F (36.8 °C) (04/05/18 1100)  Pulse: 107 (04/05/18 1215)  Resp: (!) 46 (04/05/18 1215)  BP: 104/64 (04/05/18 1200)  SpO2: (!) 89 % (04/05/18 1215) Vital Signs (24h Range):  Temp:  [98 °F (36.7 °C)-99 °F (37.2 °C)] 98.3 °F (36.8 °C)  Pulse:  [] 107  Resp:  [23-71] 46  SpO2:  [89 %-100 %] 89 %  BP: ()/(53-76) 104/64     Weight: 84.6 kg (186 lb 8.2  oz)  Body mass index is 28.37 kg/m².      Intake/Output Summary (Last 24 hours) at 04/05/18 1237  Last data filed at 04/05/18 1200   Gross per 24 hour   Intake              374 ml   Output              723 ml   Net             -349 ml       Ventilator Data:          Hemodynamic Parameters:       Lines/Drains:       Port A Cath Single Lumen 10/03/16 right subclavian (Active)   Accessed by: ALEX Schuler RN 4/4/2018  3:00 AM   Dressing Type Transparent 4/4/2018 11:00 AM   Dressing Status Clean;Dry;Intact 4/4/2018 11:00 AM   Dressing Intervention New dressing 4/4/2018  3:00 AM   Dressing Change Due 04/11/18 4/4/2018  3:00 AM   Line Status Blood return noted 4/4/2018 11:00 AM   Flush Performed Yes 4/4/2018 11:00 AM   Daily Line Review Performed 4/4/2018 11:00 AM   Type of Needle Adrian 4/4/2018 11:00 AM   Needle Insertion Date 04/04/18 4/4/2018  3:00 AM   Number of days: 548            Peripheral IV - Single Lumen 04/02/18 0409 Right Antecubital (Active)   Site Assessment Clean;Dry;Intact 4/4/2018 11:00 AM   Line Status Infusing 4/4/2018 11:00 AM   Dressing Status Clean;Dry;Intact 4/4/2018 11:00 AM   Dressing Intervention Dressing reinforced 4/4/2018  3:00 AM   Dressing Change Due 04/06/18 4/4/2018  3:00 AM   Site Change Due 04/06/18 4/4/2018  3:00 AM   Reason Not Rotated Not due 4/4/2018 11:00 AM   Number of days: 2            NG/OG Tube 04/04/18 0452 [place] sump Right nostril (Active)   Placement Check placement verified by aspirate characteristics 4/4/2018 11:00 AM   Distal Tube Length (cm) 60 4/4/2018  7:00 AM   Tolerance no signs/symptoms of discomfort 4/4/2018 11:00 AM   Securement taped to nostril center 4/4/2018 11:00 AM   Clamp Status/Tolerance unclamped 4/4/2018 11:00 AM   Suction Setting/Drainage Method low;suction at;intermittent setting 4/4/2018 11:00 AM   Insertion Site Appearance no redness, warmth, tenderness, skin breakdown, drainage 4/4/2018 11:00 AM   Drainage Yellow;Green 4/4/2018 11:00 AM    Flush/Irrigation flushed w/;no resistance met 4/4/2018 11:00 AM   Number of days: 0       Significant Labs:  CBC:    Recent Labs  Lab 04/05/18  0900   WBC 19.79*   RBC 2.55*   HGB 7.9*   HCT 24.6*   PLT 84*   MCV 97   MCH 31.0   MCHC 32.1     BMP:    Recent Labs  Lab 04/05/18  0900      K 5.4*      CO2 18*   *   CREATININE 3.5*   CALCIUM 8.8      Tacrolimus Levels:    Recent Labs  Lab 04/05/18  0413   TACROLIMUS 3.6*     Microbiology:  Microbiology Results (last 7 days)     Procedure Component Value Units Date/Time    Blood culture [[id number]] Collected:  04/04/18 0007    Order Status:  Completed Specimen:  Blood Updated:  04/05/18 0613     Blood Culture, Routine No Growth to date     Blood Culture, Routine No Growth to date    Blood culture [[id number]] Collected:  04/04/18 0023    Order Status:  Completed Specimen:  Blood Updated:  04/05/18 0613     Blood Culture, Routine No Growth to date     Blood Culture, Routine No Growth to date    Blood culture [[id number]]     Order Status:  Canceled Specimen:  Blood     Blood culture [[id number]]     Order Status:  Canceled Specimen:  Blood     Urine culture [[id number]] Collected:  04/02/18 0809    Order Status:  Completed Specimen:  Urine Updated:  04/03/18 0929     Urine Culture, Routine No significant growth    Narrative:       Preferred Collection Type->Urine, Clean Catch          I have reviewed all pertinent labs within the past 24 hours.    Diagnostic Results:  Chest X-Ray: bilateral patchy infiltrates worse in the bases; elevated left hemidiaphragm     ECG: No peaked T waves or prolonged intervals

## 2018-04-05 NOTE — ASSESSMENT & PLAN NOTE
Related to liver failure.  Ammonia 41.  Having bowel movements with Kayexalate.  Mentation improved today.  CT head without acute changes.    Continue to monitor mental status.

## 2018-04-06 NOTE — ASSESSMENT & PLAN NOTE
Acute on stage 3 CKD.  Likely multifactorial - CARROLL vs tumor lysis vs tacrolimus toxicity.  FENa consistent with pre-renal etiology.    No intervention necessary at this time.

## 2018-04-06 NOTE — ASSESSMENT & PLAN NOTE
BLT in 4/2012.  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  History of A2 rejection in 2013, most recent bronchoscopy on 1/23 without evidence of rejection, BAL cx wnl.      Discontinued all immunosuppressants and prophylactic treatments. Patient receiving palliative care. Patient now DNR/DNI.

## 2018-04-06 NOTE — ASSESSMENT & PLAN NOTE
Pt with worsening alk phos, total bili, and AST in a cholestatic pattern.  Has multiple hypoechoic liver lesions that are concerning for malignancy.  Ammonia 41.  RUQ US with multiple liver lesions and no biliary obstruction or vascular thrombosis.  Total bili 29 with direct bili >14.  Lactate elevated at >12 related to liver failure with less concern for sepsis.  Initial CT abd with new liver lesions, splenomegaly, and abdominal lymphadenopathy.  INR 1.9 today.  Hepatology following and appreciate recommendations.     Continue with supportive care.  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  No further intervention required at this time.

## 2018-04-06 NOTE — SUBJECTIVE & OBJECTIVE
Subjective:     Interval History:   Patient now on palliative care. Patient now DNR/DNI. Patient with declining mental status.  Opens eyes to voice only. Family at bedside. Pain controlled with morphine 1 mg [id number] and lorazepam 2mg IV Q30min PRN. Currently 90% on 4L NC and tachypnic. Will anticipate transfer to [facility] today for ongoing palliative care.        Continuous Infusions:  Scheduled Meds:    PRN Meds:lorazepam, morphine    Review of patient's allergies indicates:  No Known Allergies    Review of Systems   Unable to perform ROS: Mental status change     Objective:   Physical Exam   Constitutional: She appears well-developed and well-nourished. No distress.   HENT:   Head: Normocephalic and atraumatic.   Mouth/Throat: No oropharyngeal exudate.   Eyes: EOM are normal. Pupils are equal, round, and reactive to light. Scleral icterus is present.   Neck: Normal range of motion. Neck supple. No tracheal deviation present.   Cardiovascular: Regular rhythm and normal heart sounds.  Exam reveals no gallop and no friction rub.    No murmur heard.  Tachycardic     Pulmonary/Chest: Effort normal. No stridor. No respiratory distress. She has no wheezes. She has no rales.   Decreased breath sounds in bilateral bases  Tachypnic       Abdominal: Soft. Bowel sounds are normal. She exhibits mass (splenomegaly noted). She exhibits no distension. There is no tenderness. There is no guarding.   Musculoskeletal: She exhibits edema and tenderness (lower back). She exhibits no deformity.   TTP along thoracic and lumbar spine.  Normal sensation. Back ROM limited secondary to pain.    Neurological:   Opens eyes to voice only   Skin: Skin is warm. No rash noted. She is not diaphoretic. No erythema. No pallor.   Jaundiced   Nursing note and vitals reviewed.        Vital Signs (Most Recent):  Temp: 98.5 °F (36.9 °C) (04/06/18 0700)  Pulse: (!) 115 (04/06/18 0800)  Resp: (!) 40 (04/06/18 0800)  BP: (!) 122/59 (04/06/18 0700)  SpO2: (!) 90  % (04/06/18 0800) Vital Signs (24h Range):  Temp:  [97.5 °F (36.4 °C)-98.5 °F (36.9 °C)] 98.5 °F (36.9 °C)  Pulse:  [103-115] 115  Resp:  [33-78] 40  SpO2:  [87 %-100 %] 90 %  BP: ()/(55-77) 122/59     Weight: 84 kg (185 lb 3 oz)  Body mass index is 28.16 kg/m².      Intake/Output Summary (Last 24 hours) at 04/06/18 1004  Last data filed at 04/06/18 0700   Gross per 24 hour   Intake              309 ml   Output              205 ml   Net              104 ml       Ventilator Data:          Hemodynamic Parameters:       Lines/Drains:       Port A Cath Single Lumen 10/03/16 right subclavian (Active)   Accessed by: ALEX Schuler RN 4/4/2018  3:00 AM   Dressing Type Transparent 4/4/2018 11:00 AM   Dressing Status Clean;Dry;Intact 4/4/2018 11:00 AM   Dressing Intervention New dressing 4/4/2018  3:00 AM   Dressing Change Due 04/11/18 4/4/2018  3:00 AM   Line Status Blood return noted 4/4/2018 11:00 AM   Flush Performed Yes 4/4/2018 11:00 AM   Daily Line Review Performed 4/4/2018 11:00 AM   Type of Needle Adrian 4/4/2018 11:00 AM   Needle Insertion Date 04/04/18 4/4/2018  3:00 AM   Number of days: 548            Peripheral IV - Single Lumen 04/02/18 0409 Right Antecubital (Active)   Site Assessment Clean;Dry;Intact 4/4/2018 11:00 AM   Line Status Infusing 4/4/2018 11:00 AM   Dressing Status Clean;Dry;Intact 4/4/2018 11:00 AM   Dressing Intervention Dressing reinforced 4/4/2018  3:00 AM   Dressing Change Due 04/06/18 4/4/2018  3:00 AM   Site Change Due 04/06/18 4/4/2018  3:00 AM   Reason Not Rotated Not due 4/4/2018 11:00 AM   Number of days: 2            NG/OG Tube 04/04/18 0452 [place] sump Right nostril (Active)   Placement Check placement verified by aspirate characteristics 4/4/2018 11:00 AM   Distal Tube Length (cm) 60 4/4/2018  7:00 AM   Tolerance no signs/symptoms of discomfort 4/4/2018 11:00 AM   Securement taped to nostril center 4/4/2018 11:00 AM   Clamp Status/Tolerance unclamped 4/4/2018 11:00 AM   Suction  Setting/Drainage Method low;suction at;intermittent setting 4/4/2018 11:00 AM   Insertion Site Appearance no redness, warmth, tenderness, skin breakdown, drainage 4/4/2018 11:00 AM   Drainage Yellow;Green 4/4/2018 11:00 AM   Flush/Irrigation flushed w/;no resistance met 4/4/2018 11:00 AM   Number of days: 0       Significant Labs:  CBC:    Recent Labs  Lab 04/05/18  0900   WBC 19.79*   RBC 2.55*   HGB 7.9*   HCT 24.6*   PLT 84*   MCV 97   MCH 31.0   MCHC 32.1     BMP:    Recent Labs  Lab 04/05/18  0900      K 5.4*      CO2 18*   *   CREATININE 3.5*   CALCIUM 8.8      Tacrolimus Levels:    Recent Labs  Lab 04/05/18  0413   TACROLIMUS 3.6*     Microbiology:  Microbiology Results (last 7 days)     Procedure Component Value Units Date/Time    Blood culture [[id number]] Collected:  04/04/18 0023    Order Status:  Completed Specimen:  Blood Updated:  04/06/18 0612     Blood Culture, Routine No Growth to date     Blood Culture, Routine No Growth to date     Blood Culture, Routine No Growth to date    Blood culture [[id number]] Collected:  04/04/18 0007    Order Status:  Completed Specimen:  Blood Updated:  04/06/18 0612     Blood Culture, Routine No Growth to date     Blood Culture, Routine No Growth to date     Blood Culture, Routine No Growth to date    Blood culture [[id number]]     Order Status:  Canceled Specimen:  Blood     Blood culture [[id number]]     Order Status:  Canceled Specimen:  Blood     Urine culture [[id number]] Collected:  04/02/18 0809    Order Status:  Completed Specimen:  Urine Updated:  04/03/18 0929     Urine Culture, Routine No significant growth    Narrative:       Preferred Collection Type->Urine, Clean Catch          I have reviewed all pertinent labs within the past 24 hours.    Diagnostic Results:  Chest X-Ray: bilateral patchy infiltrates worse in the bases; elevated left hemidiaphragm     ECG: No peaked T waves or prolonged intervals

## 2018-04-06 NOTE — CARE UPDATE
Will sign off today. Computer glance.  ** Please call Endocrine for any BG related issues **  Damion Duggan, JOSEF  [phone]

## 2018-04-06 NOTE — PROGRESS NOTES
[facility]  Hematology/Oncology  Progress Note    Patient Name: Lena Lynn  Admission Date: 4/2/2018  Hospital Length of Stay: 4 days  Code Status: DNR     Subjective:     Interval History:     - Palliative care team consulted yesterday and had goals of care discussion with family. Patient made DNR/ DNI.   - Comfort care maintained.  - Today, appears somnolent but opens eye to voice. Tachypenic in 30-40s.     -  at bedside.     Medications:  Continuous Infusions:  Scheduled Meds:   methylPREDNISolone sodium succinate  80 mg Intravenous Daily    pantoprozole (PROTONIX) IV  40 mg Intravenous Daily     PRN Meds:sodium chloride, dextrose 50%, dextrose 50%, diphenhydrAMINE, glucagon (human recombinant), insulin aspart U-100, morphine     Review of Systems   Unable to perform ROS: Mental status change     Objective:     Vital Signs (Most Recent):  Temp: 97.5 °F (36.4 °C) (04/06/18 0300)  Pulse: 109 (04/06/18 0600)  Resp: (!) 41 (04/06/18 0600)  BP: 106/62 (04/06/18 0600)  SpO2: (!) 90 % (04/06/18 0600) Vital Signs (24h Range):  Temp:  [97.5 °F (36.4 °C)-98.6 °F (37 °C)] 97.5 °F (36.4 °C)  Pulse:  [101-113] 109  Resp:  [33-78] 41  SpO2:  [87 %-100 %] 90 %  BP: ()/(55-77) 106/62     Weight: 84 kg (185 lb 3 oz)  Body mass index is 28.16 kg/m².  Body surface area is 2.01 meters squared.      Intake/Output Summary (Last 24 hours) at 04/06/18 0635  Last data filed at 04/06/18 0600   Gross per 24 hour   Intake              369 ml   Output              258 ml   Net              111 ml       Physical Exam   Constitutional:   Appears weak and lethargic.    HENT:   Head: Normocephalic and atraumatic.   Mouth/Throat: Oropharynx is clear and moist.   Eyes: Scleral icterus is present.   Neck: Neck supple.   Cardiovascular: Regular rhythm.    Tachycardic   Pulmonary/Chest: Effort normal.   Diminished breath sounds at base   Abdominal: Soft. She exhibits distension.   Neurological:   Somnolent,  opens eyes to voice.    Skin: Skin is warm and dry.       Significant Labs:   CBC:     Recent Labs  Lab 04/05/18  0003 04/05/18  0414 04/05/18  0900   WBC 20.70* 19.24* 19.79*   HGB 7.7* 7.6* 7.9*   HCT 23.5* 23.4* 24.6*   PLT 92* 88* 84*   , CMP:     Recent Labs  Lab 04/05/18  0004 04/05/18  0413 04/05/18  0900     140 140  140 143   K 5.7*  5.7* 5.6*  5.6* 5.4*     104 104  104 105   CO2 24  24 21*  21* 18*   *  174* 168*  168* 172*   BUN 90*  90* 95*  95* 101*   CREATININE 3.2*  3.2* 3.3*  3.3* 3.5*   CALCIUM 8.6*  8.6* 8.6*  8.6* 8.8   PROT 5.1*  5.1* 5.1*  5.1* 5.1*   ALBUMIN 2.3*  2.3* 2.3*  2.3* 2.3*   BILITOT 47.0*  47.0* 46.7*  46.7* 49.0*   ALKPHOS 205*  205* 206*  206* 214*   *  315* 263*  263* 236*   ALT 23  23 22  22 23   ANIONGAP 12  12 15  15 20*   EGFRNONAA 14.9*  14.9* 14.4*  14.4* 13.4*   , Coagulation:     Recent Labs  Lab 04/05/18  1036   INR 1.9*   , Haptoglobin:   No results for input(s): HAPTOGLOBIN in the last 48 hours., LDH: No results for input(s): LDHCSF, BFSOURCE in the last 48 hours., Reticulocytes: No results for input(s): RETIC in the last 48 hours. and Uric Acid     Recent Labs  Lab 04/05/18  0413   URICACID 0.7*       Diagnostic Results:    US of Abdomen:    Splenomegaly with innumerable hypoechoic splenic lesions worrisome for metastatic disease.    Prominent mass lesion in the shanta hepatis, worrisome for metastatic adenopathy.    Hepatomegaly with innumerable complex predominantly hypoechoic hepatic lesions worrisome for metastatic disease.  Satisfactory Doppler evaluation of the liver, without portal vein thrombosis.    No biliary ductal dilatation.    Assessment/Plan:     Active Diagnoses:    Diagnosis Date Noted POA    PRINCIPAL PROBLEM:  Palliative care encounter [Z51.5] 04/05/2018 Not Applicable    Liver failure [K72.90] 04/04/2018 Yes    PTLD (post-transplant lymphoproliferative disorder) [T86.99, D47.Z1]  04/04/2018 Yes    Hyperkalemia [E87.5] 04/04/2018 No    Metabolic encephalopathy [G93.41] 04/04/2018 No    Tumor lysis syndrome [E88.3] 04/04/2018 Yes    CARROLL (acute kidney injury) superimposed on CKD stage 3  [N17.9] 04/02/2018 Yes    Liver lesion [K76.9] 04/02/2018 Yes    Prophylactic antibiotic [Z79.2] 03/26/2018 Not Applicable    Back pain [M54.9] 08/03/2016 Yes    Anemia [D64.9] 08/02/2016 Yes    Immunosuppression [D89.9] 09/06/2012 Yes    Lung replaced by transplant [Z94.2] 07/12/2012 Not Applicable      Problems Resolved During this Admission:    Diagnosis Date Noted Date Resolved POA       Relapsed PTLD/DLBCL  Severe Anemia/Hemolytic Anemia  MOF  Metabolic Encephalopathy  Hx of Lung Transplant   - s/p 6 cycles of R-CHOP in 2016 with post-treatment PET showing NEYMAR.   - patient with suspected relapsed PTLD/DLBCL with worsening cytopenias, elevated LDH, and abnormal US/CT findings on presentation.  - likely the aggressive nature and high tumor burden of her PTLD/DLBCL contributing to her acute decompensation, MOF, and coincident hemolytic anemia.    - started partial treatment for suspected PTLD during this admission with steroids - Day 1 - 4/4/18.  - however, given her rapid decline with multiorgan failure decision was made yesterday by family to proceed towards supportive/palliative care route.   - agree with family's decision and appreciate palliative care team's assistance. Unlikely she would have seen any significant improvement with steroids alone and doubt she would be strong enough to tolerate high dose chemotherapy.       Thank you for your consult. I will follow-up with patient. Please contact us if you have any additional questions.     Piotr Spears MD  Hematology/Oncology  [facility]

## 2018-04-06 NOTE — SUBJECTIVE & OBJECTIVE
Interval History: Patient has become more somnolent. Family (Hattie) in room along with family friend and cousin. Patient to be moved to regular floor from ICU. They do not want to move to inpatient hospice outside of [facility].    Past Medical History:   Diagnosis Date    Acute rejection of lung transplant 12/12/2013    CARROLL (acute kidney injury)     Anemia 8/2/2016    Anxiety     Back pain 2016    Recently seen in ED for back pain    Cirrhosis     Depression     Hyperlipidemia     Hypertension     Lung transplant complication 1/10/2014    Lymphoma 8/10/2016    Obesity     DOROTHY (obstructive sleep apnea)     Osteoporosis     Postinflammatory pulmonary fibrosis        Past Surgical History:   Procedure Laterality Date    APPENDECTOMY      Removed at the time of hysterectomy    HYSTERECTOMY      LUNG TRANSPLANT Bilateral 04/25/2012       Review of patient's allergies indicates:  No Known Allergies    Medications:  Continuous Infusions:  Scheduled Meds:    PRN Meds:lorazepam, morphine    Family History     Problem Relation (Age of Onset)    Cancer Father, Brother, Maternal Uncle, Paternal Uncle    Colon cancer Father    Heart attack Paternal Aunt    Heart disease Mother    Heart failure Mother    Hypertension Father, Sister, Sister, Sister    No Known Problems Daughter, Son, Maternal Grandmother, Maternal Grandfather, Paternal Grandmother, Paternal Grandfather, Paternal Aunt, Paternal Aunt    Other Father        Social History Main Topics    Smoking status: Never Smoker    Smokeless tobacco: Never Used    Alcohol use No    Drug use: No    Sexual activity: Not Currently       Review of Systems   Unable to perform ROS: Acuity of condition     Objective:     Vital Signs (Most Recent):  Temp: 98.2 °F (36.8 °C) (04/06/18 1100)  Pulse: 104 (04/06/18 1200)  Resp: (!) 30 (04/06/18 1200)  BP: (!) 98/47 (04/06/18 1100)  SpO2: (!) 93 % (04/06/18 1200) Vital Signs (24h Range):  Temp:  [97.5 °F (36.4 °C)-98.5 °F  (36.9 °C)] 98.2 °F (36.8 °C)  Pulse:  [103-115] 104  Resp:  [29-78] 30  SpO2:  [87 %-100 %] 93 %  BP: ()/(47-77) 98/47     Weight: 84 kg (185 lb 3 oz)  Body mass index is 28.16 kg/m².    Review of Symptoms  Symptom Assessment (ESAS 0-10 scale)   ESAS 0 1 2 3 4 5 6 7 8 9 10   Pain x             Dyspnea x             Anxiety x             Nausea x             Depression  x             Anorexia x             Fatigue x             Insomnia x             Restlessness  x             Agitation x             CAM / Delirium _x_ --  ___+   Constipation     _x_ --  ___+   Diarrhea           x__ --  ___+  Bowel Management Plan (BMP): No    Pain Assessment:  Negative    OME in 24 hours: morphine 2mg q6h prn    Performance Status: 10    ECOG Performance Status Grade: 4 - Completely disabled    Physical Exam   Eyes: Scleral icterus is present.   Cardiovascular: Regular rhythm.    tachycardic   Pulmonary/Chest:   Using abdominal muscles for breathing. Upper airways are clear.    Skin:   Jaundiced     Nursing note and vitals reviewed.      Significant Labs: All pertinent labs within the past 24 hours have been reviewed.  CBC:     Recent Labs  Lab 04/05/18  0900   WBC 19.79*   HGB 7.9*   HCT 24.6*   MCV 97   PLT 84*     BMP:  No results for input(s): GLU, NA, K, CL, CO2, BUN, CREATININE, CALCIUM, MG in the last 24 hours.  LFT:  Lab Results   Component Value Date     (H) 04/05/2018    ALKPHOS 214 (H) 04/05/2018    BILITOT 49.0 (H) 04/05/2018     Albumin:   Albumin   Date Value Ref Range Status   04/05/2018 2.3 (L) 3.5 - 5.2 g/dL Final     Protein:   Total Protein   Date Value Ref Range Status   04/05/2018 5.1 (L) 6.0 - 8.4 g/dL Final     Lactic acid:   Lab Results   Component Value Date    LACTATE 9.0 (HH) 04/04/2018    LACTATE >12.0 (HH) 04/04/2018       Significant Imaging: I have reviewed all pertinent imaging results/findings within the past 24 hours.    Advanced Directives::  Living Will: No  LaPOST: No  Do Not  Resuscitate Status: Yes  Medical Power of : No    Decision-Making Capacity: Family answered questions    Living Arrangements: Lives with family    Psychosocial/Cultural:  Patient's most important priorities:  Comfortable with family    Patient's biggest concerns/fears:  Not being comfortable/away from family    Previous death/end of life care history:  n/a    Patient's goals/hopes:  Comfortable with family    Spiritual:     F- Sintia and Belief:  visit    I - Importance:   visit  .  C - Community:   visit     A - Address in Care:  visit

## 2018-04-06 NOTE — ASSESSMENT & PLAN NOTE
Mental status continues to decline. Patient now DNR/DNI. Will proceed with palliative care only. Patient to receive lorazepam 2 mg [id number] PRN agitation.

## 2018-04-06 NOTE — ASSESSMENT & PLAN NOTE
Noted to have elevated uric acid level at 11 on admission.  Started on allopurinol and given a one time dose of Rasburicase.     Continue supportive care.

## 2018-04-06 NOTE — PROGRESS NOTES
[facility]  Lung Transplant  Progress Note - Critical Care    Patient Name: Lena Lynn  MRN: [id number]  Admission Date: 4/2/2018  Hospital Length of Stay: 4 days  Post-Operative Day: 2172  Attending Physician: Erick Nieves MD  Primary Care Provider: Erick Nieves MD     Subjective:     Interval History:   Patient now on palliative care. Patient now DNR/DNI. Patient with declining mental status.  Opens eyes to voice only. Family at bedside. Pain controlled with morphine 1 mg [id number] and lorazepam 2mg IV Q30min PRN. Currently 90% on 4L NC and tachypnic. Will anticipate transfer to [facility] today for ongoing palliative care.        Continuous Infusions:  Scheduled Meds:    PRN Meds:lorazepam, morphine    Review of patient's allergies indicates:  No Known Allergies    Review of Systems   Unable to perform ROS: Mental status change     Objective:   Physical Exam   Constitutional: She appears well-developed and well-nourished. No distress.   HENT:   Head: Normocephalic and atraumatic.   Mouth/Throat: No oropharyngeal exudate.   Eyes: EOM are normal. Pupils are equal, round, and reactive to light. Scleral icterus is present.   Neck: Normal range of motion. Neck supple. No tracheal deviation present.   Cardiovascular: Regular rhythm and normal heart sounds.  Exam reveals no gallop and no friction rub.    No murmur heard.  Tachycardic     Pulmonary/Chest: Effort normal. No stridor. No respiratory distress. She has no wheezes. She has no rales.   Decreased breath sounds in bilateral bases  Tachypnic       Abdominal: Soft. Bowel sounds are normal. She exhibits mass (splenomegaly noted). She exhibits no distension. There is no tenderness. There is no guarding.   Musculoskeletal: She exhibits edema and tenderness (lower back). She exhibits no deformity.   TTP along thoracic and lumbar spine.  Normal sensation. Back ROM limited secondary to pain.    Neurological:   Opens eyes to voice only   Skin:  Skin is warm. No rash noted. She is not diaphoretic. No erythema. No pallor.   Jaundiced   Nursing note and vitals reviewed.        Vital Signs (Most Recent):  Temp: 98.5 °F (36.9 °C) (04/06/18 0700)  Pulse: (!) 115 (04/06/18 0800)  Resp: (!) 40 (04/06/18 0800)  BP: (!) 122/59 (04/06/18 0700)  SpO2: (!) 90 % (04/06/18 0800) Vital Signs (24h Range):  Temp:  [97.5 °F (36.4 °C)-98.5 °F (36.9 °C)] 98.5 °F (36.9 °C)  Pulse:  [103-115] 115  Resp:  [33-78] 40  SpO2:  [87 %-100 %] 90 %  BP: ()/(55-77) 122/59     Weight: 84 kg (185 lb 3 oz)  Body mass index is 28.16 kg/m².      Intake/Output Summary (Last 24 hours) at 04/06/18 1004  Last data filed at 04/06/18 0700   Gross per 24 hour   Intake              309 ml   Output              205 ml   Net              104 ml       Ventilator Data:          Hemodynamic Parameters:       Lines/Drains:       Port A Cath Single Lumen 10/03/16 right subclavian (Active)   Accessed by: ALEX Schuler RN 4/4/2018  3:00 AM   Dressing Type Transparent 4/4/2018 11:00 AM   Dressing Status Clean;Dry;Intact 4/4/2018 11:00 AM   Dressing Intervention New dressing 4/4/2018  3:00 AM   Dressing Change Due 04/11/18 4/4/2018  3:00 AM   Line Status Blood return noted 4/4/2018 11:00 AM   Flush Performed Yes 4/4/2018 11:00 AM   Daily Line Review Performed 4/4/2018 11:00 AM   Type of Needle Adrian 4/4/2018 11:00 AM   Needle Insertion Date 04/04/18 4/4/2018  3:00 AM   Number of days: 548            Peripheral IV - Single Lumen 04/02/18 0409 Right Antecubital (Active)   Site Assessment Clean;Dry;Intact 4/4/2018 11:00 AM   Line Status Infusing 4/4/2018 11:00 AM   Dressing Status Clean;Dry;Intact 4/4/2018 11:00 AM   Dressing Intervention Dressing reinforced 4/4/2018  3:00 AM   Dressing Change Due 04/06/18 4/4/2018  3:00 AM   Site Change Due 04/06/18 4/4/2018  3:00 AM   Reason Not Rotated Not due 4/4/2018 11:00 AM   Number of days: 2            NG/OG Tube 04/04/18 0452 Nar[place]th sump Right nostril (Active)    Placement Check placement verified by aspirate characteristics 4/4/2018 11:00 AM   Distal Tube Length (cm) 60 4/4/2018  7:00 AM   Tolerance no signs/symptoms of discomfort 4/4/2018 11:00 AM   Securement taped to nostril center 4/4/2018 11:00 AM   Clamp Status/Tolerance unclamped 4/4/2018 11:00 AM   Suction Setting/Drainage Method low;suction at;intermittent setting 4/4/2018 11:00 AM   Insertion Site Appearance no redness, warmth, tenderness, skin breakdown, drainage 4/4/2018 11:00 AM   Drainage Yellow;Green 4/4/2018 11:00 AM   Flush/Irrigation flushed w/;no resistance met 4/4/2018 11:00 AM   Number of days: 0       Significant Labs:  CBC:    Recent Labs  Lab 04/05/18  0900   WBC 19.79*   RBC 2.55*   HGB 7.9*   HCT 24.6*   PLT 84*   MCV 97   MCH 31.0   MCHC 32.1     BMP:    Recent Labs  Lab 04/05/18  0900      K 5.4*      CO2 18*   *   CREATININE 3.5*   CALCIUM 8.8      Tacrolimus Levels:    Recent Labs  Lab 04/05/18  0413   TACROLIMUS 3.6*     Microbiology:  Microbiology Results (last 7 days)     Procedure Component Value Units Date/Time    Blood culture [[id number]] Collected:  04/04/18 0023    Order Status:  Completed Specimen:  Blood Updated:  04/06/18 0612     Blood Culture, Routine No Growth to date     Blood Culture, Routine No Growth to date     Blood Culture, Routine No Growth to date    Blood culture [[id number]] Collected:  04/04/18 0007    Order Status:  Completed Specimen:  Blood Updated:  04/06/18 0612     Blood Culture, Routine No Growth to date     Blood Culture, Routine No Growth to date     Blood Culture, Routine No Growth to date    Blood culture [[id number]]     Order Status:  Canceled Specimen:  Blood     Blood culture [[id number]]     Order Status:  Canceled Specimen:  Blood     Urine culture [[id number]] Collected:  04/02/18 0809    Order Status:  Completed Specimen:  Urine Updated:  04/03/18 0929     Urine Culture, Routine No significant growth    Narrative:       Preferred  Collection Type->Urine, Clean Catch          I have reviewed all pertinent labs within the past 24 hours.    Diagnostic Results:  Chest X-Ray: bilateral patchy infiltrates worse in the bases; elevated left hemidiaphragm     ECG: No peaked T waves or prolonged intervals        Assessment/Plan:     Lung replaced by transplant    BLT in 4/2012.  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  History of A2 rejection in 2013, most recent bronchoscopy on 1/23 without evidence of rejection, BAL cx wnl.      Discontinued all immunosuppressants and prophylactic treatments. Patient receiving palliative care. Patient now DNR/DNI.        Immunosuppression    Outpatient regimen - tacrolimus 1.5 mg BID, prednisone 5 mg daily.      All medications discontinued. Patient now receiving palliative care.         Prophylactic antibiotic    Bactrim discontinued.         Back pain    Likely multifactorial - Patient is s/p RCHOP ending in 2016, known vertebral lytic lesion and stable L4 compression fracture. Outpatient meds include tramadol 50mg and norco 5/325.   Pain well controlled by morphine 1 mg [id number].         CARROLL (acute kidney injury) superimposed on CKD stage 3     Acute on stage 3 CKD.  Likely multifactorial - CARROLL vs tumor lysis vs tacrolimus toxicity.  FENa consistent with pre-renal etiology.    No intervention necessary at this time.         Liver lesion    Palliative care team following for metastatic disease.               Anemia    Has had a down trending hemoglobin for the last 2 months.  Was admitted for symptomatic anemia on 3/27/2018.  Work up for anemia at that time was negative except for elevated LDH.  Presented this admission with stable Hgb and initial down trend likely related to volume resuscitation.  Overnight, patterns seem more consistent with a hemolytic anemia.    Labs discontinued. No further intervention needed at this time.             Liver failure    Pt with worsening alk phos, total bili, and AST  in a cholestatic pattern.  Has multiple hypoechoic liver lesions that are concerning for malignancy.  Ammonia 41.  RUQ US with multiple liver lesions and no biliary obstruction or vascular thrombosis.  Total bili 29 with direct bili >14.  Lactate elevated at >12 related to liver failure with less concern for sepsis.  Initial CT abd with new liver lesions, splenomegaly, and abdominal lymphadenopathy.  INR 1.9 today.  Hepatology following and appreciate recommendations.     Continue with supportive care.  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  No further intervention required at this time.             PTLD (post-transplant lymphoproliferative disorder)    Has a hx of lymphoma in 2016 presenting with back pain.  She received 6 cycles of R-CHOP and tolerated it well.  Post treatment scans up until this point have been negative for recurrence.  Over the last 2 months, has had a decrease in her cell lines.  Was hospitalized on 3/27/2018 for symptomatic anemia.  Work-up for the anemia was negative except for an LDH of 1200.  Now presents with worsening pain and CT scan of the abdomen with splenomegaly, multiple new liver lesions, and abdominal lymphadenopathy.  Heme/onc following for suspected recurrence of her disease.    Unable to get biopsy at this time due to patients clinical stability and recent ASA use. Continue with supportive care.        Hyperkalemia    Likely related to hemolysis, tumor lysis syndrome, and acidosis from liver failure.  Eventually responded to multiple doses of insulin and D50, 3 amps of bicarb, and IVF.  On Kayexalate with appropriate bowel movements.  EKG without acute changes.    Labs discontinued. No further intervention at this time.         Metabolic encephalopathy    Mental status continues to decline. Patient now DNR/DNI. Will proceed with palliative care only. Patient to receive lorazepam 2 mg [id number] PRN agitation.         Tumor  lysis syndrome    Noted to have elevated uric acid level at 11 on admission.  Started on allopurinol and given a one time dose of Rasburicase.     Continue supportive care.                Tomeka Francis PA-C  Lung Transplant  [facility]

## 2018-04-06 NOTE — PLAN OF CARE
Problem: Spiritual Distress, Risk/Actual (Adult,Obstetrics,Pediatric)  Intervention: Facilitate Personal Exploration/Expression of Spirituality  Facts (Spiritual Perception of Illness): Family present appear accepting of pt's prognosis.     Feelings (Emotions/Experiences/Coping):   Sadness     Family/Friends:   Daughter, cousin, and another guest present during encounter.     Sintia (Belief/Meaning/Philosophy): Family requested prayer support. No further spiritual needs expressed at this time.     Future (Spiritual Care Plan of Action): Introduced and offered pastoral support with prayer upon request. Family made aware of 's presence and will follow-up as needed.

## 2018-04-06 NOTE — ASSESSMENT & PLAN NOTE
Likely multifactorial - Patient is s/p RCHOP ending in 2016, known vertebral lytic lesion and stable L4 compression fracture. Outpatient meds include tramadol 50mg and norco 5/325.   Pain well controlled by morphine 1 mg [id number].

## 2018-04-06 NOTE — ASSESSMENT & PLAN NOTE
Has had a down trending hemoglobin for the last 2 months.  Was admitted for symptomatic anemia on 3/27/2018.  Work up for anemia at that time was negative except for elevated LDH.  Presented this admission with stable Hgb and initial down trend likely related to volume resuscitation.  Overnight, patterns seem more consistent with a hemolytic anemia.    Labs discontinued. No further intervention needed at this time.

## 2018-04-06 NOTE — PLAN OF CARE
No acute events during shift  Patient is a DNR, currently receiving comfort care  Awaiting bed on floor, family does not wish to transfer patient to hospice at this time  Morphine and ativan given PRN for pain/ anxiety  Patient appears comfortable, sleeping between care  Jennings still in place, making 0-5 cc of urine per hour  Family understanding of patient prognosis, emotional support provided for family and pt  Will continue to monitor

## 2018-04-06 NOTE — PROGRESS NOTES
[facility]  Palliative Medicine  Progress Note    Patient Name: Lena Lynn  MRN: [id number]  Admission Date: 4/2/2018  Hospital Length of Stay: 4 days  Code Status: DNR   Attending Provider: Erick Nieves MD  Consulting Provider: Lorraine Mccoy MD  Primary Care Physician: Erick Nieves MD  Principal Problem:Palliative care encounter    Patient information was obtained from relative(s).      Assessment/Plan:     * Palliative care encounter    04/06/2018 Patient is comfortable on current regimen. NGT is out. Patient is moving to floor. They do not want her to move to inpatient hospice out of the building.  04/05/2018 Discussed with   Patient was seen today by palliative care to discuss goals of care. Family was present. Long discussion with family, patient unable to participate.  - explained to family the role of palliative care: to keep patient/family comfortable  - discussed with the family the patient's status and condition: she has a poor prognosis and is dying.  - explained that any further aggressive intervention would not cure the patient and that the focus now is to make sure the patient is comfortable.   - family understandably sad, they wanted to know where she could/would go  - discussed with family different options including inpatient hospice, moving to floor of hospital and the benefits (increased visiting hours).  - explained that they did not have to come to a decision immediately, and to think about where they'd like to move the patient.   - asked the family if patient's heart would stop, if they wanted any intervention (DNR), they do not want to see patient suffer and are ok with DNR  - would recommend NG tube removal and discontinuation of all oral medications: citalopram, diphenhydramine, allopurinol, folic acid/ vit k tablets, ursodiol  - consider discontinuing antibiotics/insulin   - will continue to follow            I will follow-up with patient. Please  contact us if you have any additional questions.    Subjective:     Chief Complaint:   Chief Complaint   Patient presents with    Back Pain     pt c/o lower back pain that started tonight, pt had double lung transplant in 2012, pt reports that she felt this pain before and was diagnosed with lymphoma cancer, pt reports that pain is not controlled by pain pills        HPI:   Patient is 62 year old female with past medical history of bilateral lung transplant and lymphoma who presented to [facility] ED on 3/27 for left shoulder/arm pain. Imaging subsequently showed multiorgan lesions suspicious for metastasis. Palliative care was consulted for goals of care.     Hospital Course:  No notes on file    Interval History: Patient has become more somnolent. Family (Hattie) in room along with family friend and cousin. Patient to be moved to regular floor from ICU. They do not want to move to inpatient hospice outside of [facility].    Past Medical History:   Diagnosis Date    Acute rejection of lung transplant 12/12/2013    CARROLL (acute kidney injury)     Anemia 8/2/2016    Anxiety     Back pain 2016    Recently seen in ED for back pain    Cirrhosis     Depression     Hyperlipidemia     Hypertension     Lung transplant complication 1/10/2014    Lymphoma 8/10/2016    Obesity     DOROTHY (obstructive sleep apnea)     Osteoporosis     Postinflammatory pulmonary fibrosis        Past Surgical History:   Procedure Laterality Date    APPENDECTOMY      Removed at the time of hysterectomy    HYSTERECTOMY      LUNG TRANSPLANT Bilateral 04/25/2012       Review of patient's allergies indicates:  No Known Allergies    Medications:  Continuous Infusions:  Scheduled Meds:    PRN Meds:lorazepam, morphine    Family History     Problem Relation (Age of Onset)    Cancer Father, Brother, Maternal Uncle, Paternal Uncle    Colon cancer Father    Heart attack Paternal Aunt    Heart disease Mother    Heart failure Mother    Hypertension  Father, Sister, Sister, Sister    No Known Problems Daughter, Son, Maternal Grandmother, Maternal Grandfather, Paternal Grandmother, Paternal Grandfather, Paternal Aunt, Paternal Aunt    Other Father        Social History Main Topics    Smoking status: Never Smoker    Smokeless tobacco: Never Used    Alcohol use No    Drug use: No    Sexual activity: Not Currently       Review of Systems   Unable to perform ROS: Acuity of condition     Objective:     Vital Signs (Most Recent):  Temp: 98.2 °F (36.8 °C) (04/06/18 1100)  Pulse: 104 (04/06/18 1200)  Resp: (!) 30 (04/06/18 1200)  BP: (!) 98/47 (04/06/18 1100)  SpO2: (!) 93 % (04/06/18 1200) Vital Signs (24h Range):  Temp:  [97.5 °F (36.4 °C)-98.5 °F (36.9 °C)] 98.2 °F (36.8 °C)  Pulse:  [103-115] 104  Resp:  [29-78] 30  SpO2:  [87 %-100 %] 93 %  BP: ()/(47-77) 98/47     Weight: 84 kg (185 lb 3 oz)  Body mass index is 28.16 kg/m².    Review of Symptoms  Symptom Assessment (ESAS 0-10 scale)   ESAS 0 1 2 3 4 5 6 7 8 9 10   Pain x             Dyspnea x             Anxiety x             Nausea x             Depression  x             Anorexia x             Fatigue x             Insomnia x             Restlessness  x             Agitation x             CAM / Delirium _x_ --  ___+   Constipation     _x_ --  ___+   Diarrhea           x__ --  ___+  Bowel Management Plan (BMP): No    Pain Assessment:  Negative    OME in 24 hours: morphine 2mg q6h prn    Performance Status: 10    ECOG Performance Status Grade: 4 - Completely disabled    Physical Exam   Eyes: Scleral icterus is present.   Cardiovascular: Regular rhythm.    tachycardic   Pulmonary/Chest:   Using abdominal muscles for breathing. Upper airways are clear.    Skin:   Jaundiced     Nursing note and vitals reviewed.      Significant Labs: All pertinent labs within the past 24 hours have been reviewed.  CBC:     Recent Labs  Lab 04/05/18  0900   WBC 19.79*   HGB 7.9*   HCT 24.6*   MCV 97   PLT 84*     BMP:  No  results for input(s): GLU, NA, K, CL, CO2, BUN, CREATININE, CALCIUM, MG in the last 24 hours.  LFT:  Lab Results   Component Value Date     (H) 04/05/2018    ALKPHOS 214 (H) 04/05/2018    BILITOT 49.0 (H) 04/05/2018     Albumin:   Albumin   Date Value Ref Range Status   04/05/2018 2.3 (L) 3.5 - 5.2 g/dL Final     Protein:   Total Protein   Date Value Ref Range Status   04/05/2018 5.1 (L) 6.0 - 8.4 g/dL Final     Lactic acid:   Lab Results   Component Value Date    LACTATE 9.0 (HH) 04/04/2018    LACTATE >12.0 (HH) 04/04/2018       Significant Imaging: I have reviewed all pertinent imaging results/findings within the past 24 hours.    Advanced Directives::  Living Will: No  LaPOST: No  Do Not Resuscitate Status: Yes  Medical Power of : No    Decision-Making Capacity: Family answered questions    Living Arrangements: Lives with family    Psychosocial/Cultural:  Patient's most important priorities:  Comfortable with family    Patient's biggest concerns/fears:  Not being comfortable/away from family    Previous death/end of life care history:  n/a    Patient's goals/hopes:  Comfortable with family    Spiritual:     F- Sintia and Belief:  visit    I - Importance:   visit  .  C - Community:   visit     A - Address in Care:  visit      > 50% of 15 min visit spent in chart review, face to face discussion of goals of care,  symptom assessment, coordination of care and emotional support.    Lorraine Mccoy MD  Palliative Medicine  [facility]  [phone] cell

## 2018-04-06 NOTE — ASSESSMENT & PLAN NOTE
Likely related to hemolysis, tumor lysis syndrome, and acidosis from liver failure.  Eventually responded to multiple doses of insulin and D50, 3 amps of bicarb, and IVF.  On Kayexalate with appropriate bowel movements.  EKG without acute changes.    Labs discontinued. No further intervention at this time.

## 2018-04-06 NOTE — PLAN OF CARE
Problem: Patient Care Overview  Goal: Plan of Care Review  Outcome: Ongoing (interventions implemented as appropriate)  Afebrile, HR and BP stable, SpO2 >90 on 4L NC  Pt does not follow commands, withdraws from pain, only moans in response and will look at speaker.  Comfort care maintained  Emotional support provided to family.  Transfer pending.  All questions/concerns answered/addressed.   Plan reviewed with significant other at bedside  Will continue to monitor

## 2018-04-06 NOTE — ASSESSMENT & PLAN NOTE
04/06/2018 Patient is comfortable on current regimen. NGT is out. Patient is moving to floor. They do not want her to move to inpatient hospice out of the building.  04/05/2018 Discussed with   Patient was seen today by palliative care to discuss goals of care. Family was present. Long discussion with family, patient unable to participate.  - explained to family the role of palliative care: to keep patient/family comfortable  - discussed with the family the patient's status and condition: she has a poor prognosis and is dying.  - explained that any further aggressive intervention would not cure the patient and that the focus now is to make sure the patient is comfortable.   - family understandably sad, they wanted to know where she could/would go  - discussed with family different options including inpatient hospice, moving to floor of hospital and the benefits (increased visiting hours).  - explained that they did not have to come to a decision immediately, and to think about where they'd like to move the patient.   - asked the family if patient's heart would stop, if they wanted any intervention (DNR), they do not want to see patient suffer and are ok with DNR  - would recommend NG tube removal and discontinuation of all oral medications: citalopram, diphenhydramine, allopurinol, folic acid/ vit k tablets, ursodiol  - consider discontinuing antibiotics/insulin   - will continue to follow

## 2018-04-06 NOTE — ASSESSMENT & PLAN NOTE
Outpatient regimen - tacrolimus 1.5 mg BID, prednisone 5 mg daily.      All medications discontinued. Patient now receiving palliative care.

## 2018-04-06 NOTE — ASSESSMENT & PLAN NOTE
Has a hx of lymphoma in 2016 presenting with back pain.  She received 6 cycles of R-CHOP and tolerated it well.  Post treatment scans up until this point have been negative for recurrence.  Over the last 2 months, has had a decrease in her cell lines.  Was hospitalized on 3/27/2018 for symptomatic anemia.  Work-up for the anemia was negative except for an LDH of 1200.  Now presents with worsening pain and CT scan of the abdomen with splenomegaly, multiple new liver lesions, and abdominal lymphadenopathy.  Heme/onc following for suspected recurrence of her disease.    Unable to get biopsy at this time due to patients clinical stability and recent ASA use. Continue with supportive care.

## 2018-04-07 NOTE — ASSESSMENT & PLAN NOTE
Secondary to patient's many liver lesions believed to be recurrence of her lymphoma.   RUQ US with multiple liver lesions and no biliary obstruction or vascular thrombosis.    Mental status worsening, but patient appears comfortable   Pt transitioned to comfort care.

## 2018-04-07 NOTE — ASSESSMENT & PLAN NOTE
Mental status continues to decline. Patient now DNR/DNI. Will proceed with palliative care only. Patient to receive lorazepam 2 mg [id number] PRN agitation.

## 2018-04-07 NOTE — ASSESSMENT & PLAN NOTE
BLT in 4/2012.  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  History of A2 rejection in 2013, most recent bronchoscopy on 1/23 without evidence of rejection, BAL cx wnl.      Discontinued all immunosuppressants and prophylactic treatments. Pt DNR/DNi and has been transitioned to comfort care per family's wishes

## 2018-04-07 NOTE — NURSING
Pt arrived to 8083. BP low. Pt in no acute distress. ICU RN gave dose of ativan prior to transfer. Pt's fiance at bedside. Will continue to monitor.

## 2018-04-07 NOTE — ASSESSMENT & PLAN NOTE
Has a hx of lymphoma in 2016 presenting with back pain.  She received 6 cycles of R-CHOP and tolerated it well.  Post treatment scans up until this point have been negative for recurrence.  Over the last 2 months, has had a decrease in her cell lines.  Was hospitalized on 3/27/2018 for symptomatic anemia.  Work-up for the anemia was negative except for an LDH of 1200.  Now presents with worsening pain and CT scan of the abdomen with splenomegaly, multiple new liver lesions, and abdominal lymphadenopathy.  Heme/onc following for suspected recurrence of her lymphoma. Biopsy was not done due to rapid decline in patient's condition.     Pt now transitioned to comfort care per her family's wishes.

## 2018-04-07 NOTE — SIGNIFICANT EVENT
TXP Med Lung IP Death Note:    Called by the charge nurse to see patient for lack of respiration and pulse.  Patient's medical illness: Liver failure from metastatic malignancy   Pt was a DNR.     Examination:  Pupillary response absent  Pulse absent  Spontaneous respirations absent  Response to painful stimuli absent    Patient was pronounced dead at 1731 on 4/7/18  Family was at bedside. They were notified and all questions answered.   Family declined autopsy.     Notified  and attending physician.

## 2018-04-07 NOTE — ASSESSMENT & PLAN NOTE
Outpatient regimen - tacrolimus 1.5 mg BID, prednisone 5 mg daily.      All medications discontinued. Patient transitioned to comfort care

## 2018-04-07 NOTE — NURSING TRANSFER
Nursing Transfer Note      4/7/2018     Transfer from SICU to 8083    Transfer via bed    Transfer with O2    Transported by RN/PCT    Medicines sent: non    Chart send with patient: yes    Notified:    Patient reassessed at: 4/7/18 0934    Upon arrival to floor: JOE Izaguirre at bedside to assess patient

## 2018-04-07 NOTE — SIGNIFICANT EVENT
"JOE Estrada called to bedside by pt family member. Per family member they believe pt "has passed." JOE Estrada listened for breath sounds and heart beat with nothing heard. This RN at bedside to inform charge RN and Lung Transplant Fellow. MD to come to bed side. Rikki notified.  "

## 2018-04-07 NOTE — SUBJECTIVE & OBJECTIVE
Subjective:     Interval History:   Pt appears comfortable. She was moved to room 8083 from ICU. Family at bedside and appear agreeable. Last dose of ativan received at 5 am this morning.     Continuous Infusions:  Scheduled Meds:  PRN Meds:lorazepam, morphine    Review of patient's allergies indicates:  No Known Allergies    Review of Systems   Unable to perform ROS: Mental status change     Objective:   Physical Exam   Constitutional: She appears well-developed and well-nourished.   HENT:   Head: Normocephalic.   Eyes: Pupils are equal, round, and reactive to light.   Neck: Normal range of motion. Neck supple.   Cardiovascular: Normal rate, regular rhythm and normal heart sounds.    Pulmonary/Chest: She is in respiratory distress (mild). She has no wheezes. She has no rales.   tachypneic      Abdominal: Soft. There is no tenderness. There is no guarding.   Musculoskeletal: She exhibits no edema.   Neurological:   Lethargic. Sleepy. Does not respond to command. Does not withdraw to pain.    Skin: Skin is warm and dry. No erythema.   Psychiatric:   Flat affect     Nursing note and vitals reviewed.        Vital Signs (Most Recent):  Temp: 98.7 °F (37.1 °C) (04/07/18 0933)  Pulse: 106 (04/07/18 0933)  Resp: (!) 28 (04/07/18 0933)  BP: (!) 79/43 (04/07/18 0933)  SpO2: (!) 92 % (04/07/18 0933) Vital Signs (24h Range):  Temp:  [98.4 °F (36.9 °C)-99.1 °F (37.3 °C)] 98.7 °F (37.1 °C)  Pulse:  [100-108] 106  Resp:  [25-35] 28  SpO2:  [88 %-96 %] 92 %  BP: ()/(43-63) 79/43     Weight: 84 kg (185 lb 3 oz)  Body mass index is 28.16 kg/m².      Intake/Output Summary (Last 24 hours) at 04/07/18 1346  Last data filed at 04/07/18 0900   Gross per 24 hour   Intake                0 ml   Output               37 ml   Net              -37 ml       Significant Labs:  CBC:    Recent Labs  Lab 04/05/18 0900   WBC 19.79*   RBC 2.55*   HGB 7.9*   HCT 24.6*   PLT 84*   MCV 97   MCH 31.0   MCHC 32.1     BMP:    Recent Labs  Lab  04/05/18  0900      K 5.4*      CO2 18*   *   CREATININE 3.5*   CALCIUM 8.8      Tacrolimus Levels:    Recent Labs  Lab 04/05/18  0413   TACROLIMUS 3.6*     Microbiology:  Microbiology Results (last 7 days)     Procedure Component Value Units Date/Time    Blood culture [[id number]] Collected:  04/04/18 0023    Order Status:  Completed Specimen:  Blood Updated:  04/07/18 0612     Blood Culture, Routine No Growth to date     Blood Culture, Routine No Growth to date     Blood Culture, Routine No Growth to date     Blood Culture, Routine No Growth to date    Blood culture [[id number]] Collected:  04/04/18 0007    Order Status:  Completed Specimen:  Blood Updated:  04/07/18 0612     Blood Culture, Routine No Growth to date     Blood Culture, Routine No Growth to date     Blood Culture, Routine No Growth to date     Blood Culture, Routine No Growth to date    Blood culture [[id number]]     Order Status:  Canceled Specimen:  Blood     Blood culture [[id number]]     Order Status:  Canceled Specimen:  Blood     Urine culture [[id number]] Collected:  04/02/18 0809    Order Status:  Completed Specimen:  Urine Updated:  04/03/18 0929     Urine Culture, Routine No significant growth    Narrative:       Preferred Collection Type->Urine, Clean Catch          I have reviewed all pertinent labs within the past 24 hours.    Diagnostic Results:  Reviewed

## 2018-04-07 NOTE — ASSESSMENT & PLAN NOTE
Noted to have elevated uric acid level at 11 on admission.  Started on allopurinol and given a one time dose of Rasburicase. All meds discontinued.     Continue comfort care.

## 2018-04-07 NOTE — ASSESSMENT & PLAN NOTE
Acute on stage 3 CKD.  Likely multifactorial - CARROLL vs tumor lysis vs tacrolimus toxicity.  FENa consistent with pre-renal etiology. Urine output decreasing.     No intervention necessary at this time.

## 2018-04-07 NOTE — PROGRESS NOTES
[facility]  Lung Transplant  Progress Note - Floor    Patient Name: Lena Lynn  MRN: [id number]  Admission Date: 4/2/2018  Hospital Length of Stay: 5 days  Post-Operative Day: 2173  Attending Physician: Erick Nieves MD  Primary Care Provider: Erick Nieves MD     Subjective:     Interval History:   Pt appears comfortable. She was moved to room 8083 from ICU. Family at bedside and appear agreeable. Last dose of ativan received at 5 am this morning.     Continuous Infusions:  Scheduled Meds:  PRN Meds:lorazepam, morphine    Review of patient's allergies indicates:  No Known Allergies    Review of Systems   Unable to perform ROS: Mental status change     Objective:   Physical Exam   Constitutional: She appears well-developed and well-nourished.   HENT:   Head: Normocephalic.   Eyes: Pupils are equal, round, and reactive to light.   Neck: Normal range of motion. Neck supple.   Cardiovascular: Normal rate, regular rhythm and normal heart sounds.    Pulmonary/Chest: She is in respiratory distress (mild). She has no wheezes. She has no rales.   tachypneic      Abdominal: Soft. There is no tenderness. There is no guarding.   Musculoskeletal: She exhibits no edema.   Neurological:   Lethargic. Sleepy. Does not respond to command. Does not withdraw to pain.    Skin: Skin is warm and dry. No erythema.   Psychiatric:   Flat affect     Nursing note and vitals reviewed.        Vital Signs (Most Recent):  Temp: 98.7 °F (37.1 °C) (04/07/18 0933)  Pulse: 106 (04/07/18 0933)  Resp: (!) 28 (04/07/18 0933)  BP: (!) 79/43 (04/07/18 0933)  SpO2: (!) 92 % (04/07/18 0933) Vital Signs (24h Range):  Temp:  [98.4 °F (36.9 °C)-99.1 °F (37.3 °C)] 98.7 °F (37.1 °C)  Pulse:  [100-108] 106  Resp:  [25-35] 28  SpO2:  [88 %-96 %] 92 %  BP: ()/(43-63) 79/43     Weight: 84 kg (185 lb 3 oz)  Body mass index is 28.16 kg/m².      Intake/Output Summary (Last 24 hours) at 04/07/18 1346  Last data filed at 04/07/18  0900   Gross per 24 hour   Intake                0 ml   Output               37 ml   Net              -37 ml       Significant Labs:  CBC:    Recent Labs  Lab 04/05/18  0900   WBC 19.79*   RBC 2.55*   HGB 7.9*   HCT 24.6*   PLT 84*   MCV 97   MCH 31.0   MCHC 32.1     BMP:    Recent Labs  Lab 04/05/18  0900      K 5.4*      CO2 18*   *   CREATININE 3.5*   CALCIUM 8.8      Tacrolimus Levels:    Recent Labs  Lab 04/05/18  0413   TACROLIMUS 3.6*     Microbiology:  Microbiology Results (last 7 days)     Procedure Component Value Units Date/Time    Blood culture [[id number]] Collected:  04/04/18 0023    Order Status:  Completed Specimen:  Blood Updated:  04/07/18 0612     Blood Culture, Routine No Growth to date     Blood Culture, Routine No Growth to date     Blood Culture, Routine No Growth to date     Blood Culture, Routine No Growth to date    Blood culture [[id number]] Collected:  04/04/18 0007    Order Status:  Completed Specimen:  Blood Updated:  04/07/18 0612     Blood Culture, Routine No Growth to date     Blood Culture, Routine No Growth to date     Blood Culture, Routine No Growth to date     Blood Culture, Routine No Growth to date    Blood culture [[id number]]     Order Status:  Canceled Specimen:  Blood     Blood culture [[id number]]     Order Status:  Canceled Specimen:  Blood     Urine culture [[id number]] Collected:  04/02/18 0809    Order Status:  Completed Specimen:  Urine Updated:  04/03/18 0929     Urine Culture, Routine No significant growth    Narrative:       Preferred Collection Type->Urine, Clean Catch          I have reviewed all pertinent labs within the past 24 hours.    Diagnostic Results:  Reviewed      Assessment/Plan:     PTLD (post-transplant lymphoproliferative disorder)    Has a hx of lymphoma in 2016 presenting with back pain.  She received 6 cycles of R-CHOP and tolerated it well.  Post treatment scans up until this point have been negative for recurrence.  Over the last  2 months, has had a decrease in her cell lines.  Was hospitalized on 3/27/2018 for symptomatic anemia.  Work-up for the anemia was negative except for an LDH of 1200.  Now presents with worsening pain and CT scan of the abdomen with splenomegaly, multiple new liver lesions, and abdominal lymphadenopathy.  Heme/onc following for suspected recurrence of her lymphoma. Biopsy was not done due to rapid decline in patient's condition.     Pt now transitioned to comfort care per her family's wishes.         Liver failure    Secondary to patient's many liver lesions believed to be recurrence of her lymphoma.   RUQ US with multiple liver lesions and no biliary obstruction or vascular thrombosis.    Mental status worsening, but patient appears comfortable   Pt transitioned to comfort care.             Tumor lysis syndrome    Noted to have elevated uric acid level at 11 on admission.  Started on allopurinol and given a one time dose of Rasburicase. All meds discontinued.     Continue comfort care.         Metabolic encephalopathy    Mental status continues to decline. Patient now DNR/DNI. Will proceed with palliative care only. Patient to receive lorazepam 2 mg [id number] PRN agitation.         Hyperkalemia    Likely related to hemolysis, tumor lysis syndrome, and acidosis from liver failure.  Eventually responded to multiple doses of insulin and D50, 3 amps of bicarb, and IVF.  On Kayexalate with appropriate bowel movements.  EKG without acute changes.    Labs discontinued. No further intervention at this time.         Liver lesion    Palliative care team following for metastatic disease.               CARROLL (acute kidney injury) superimposed on CKD stage 3     Acute on stage 3 CKD.  Likely multifactorial - CARROLL vs tumor lysis vs tacrolimus toxicity.  FENa consistent with pre-renal etiology. Urine output decreasing.     No intervention necessary at this time.         Prophylactic antibiotic    Bactrim discontinued.         Back  pain    Likely multifactorial - Patient is s/p RCHOP ending in 2016, known vertebral lytic lesion and stable L4 compression fracture. Outpatient meds include tramadol 50mg and norco 5/325.     Pain well controlled by morphine 1 mg [id number] PRN pain/discomfort. Pt appears comfortable and not in any pain.         Anemia    Has had a down trending hemoglobin for the last 2 months.  Was admitted for symptomatic anemia on 3/27/2018.  Work up for anemia at that time was negative except for elevated LDH.  Presented this admission with stable Hgb and initial down trend likely related to volume resuscitation.  Overnight, patterns seem more consistent with a hemolytic anemia.    Labs discontinued. No further intervention needed at this time.             Immunosuppression    Outpatient regimen - tacrolimus 1.5 mg BID, prednisone 5 mg daily.      All medications discontinued. Patient transitioned to comfort care         Lung replaced by transplant    BLT in 4/2012.  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  History of A2 rejection in 2013, most recent bronchoscopy on 1/23 without evidence of rejection, BAL cx wnl.      Discontinued all immunosuppressants and prophylactic treatments. Pt DNR/DNi and has been transitioned to comfort care per family's wishes             Norman Jarvis MD  Lung Transplant  [facility]

## 2018-04-08 PROCEDURE — 99238 HOSP IP/OBS DSCHRG MGMT 30/<: CPT | Mod: ,,, | Performed by: INTERNAL MEDICINE

## 2018-04-08 NOTE — DISCHARGE SUMMARY
[facility]  Lung Transplant  Discharge Summary      Patient Name: Lena Lynn  MRN: [id number]  Admission Date: 4/2/2018  Hospital Length of Stay: 6 days  Discharge Date and Time: 04/08/2018 11:21 AM  Attending Physician: Erick Nieves MD  Discharging Provider: Norman Jarvis MD  Primary Care Provider: Erick Nieves MD     HPI: Lena Lynn is a 62 y.o. female  s/p bilateral lung transplant (4/25/2012) 2/2 hypersensitivity pneumonitis, A2 rejection in 12/2013, with history of PTLD w/ vertebral lytic lesion s/p RCHOP ending in 2016, and osteoporosis who presented to the ED with complaints of back pain x2days. Patient reports low back pain, constant in duration and radiating to the right buttocks. Back pain is worsened by movements and palpation.  Patient states no alleviating factors and that current home pain meds do not provide relief. Patient denies dysuria, hematuria, urinary urgency/freuqency, abdominal pain, N/V/D/C, fevers, chills, recent sick contacts, recent trauma to the area. Patient also denies SOB, chest pain, cough.  Patient has had ongoing LIRIANO unchanged from baseline.   Patient was recently admitted for observation on 3/27 for symptomatic anemia, received 1 unit PRBCs and was discharged without complications.     ED workup revealed elevated alk phos 224, t.bili 6.5, and AST 97, BUN 35, Cr 2.0.  CT Abd/Pelvis revealed 1.8 cm hepatic lesion, new splenomegaly, and increased lymphadenopathy. Patient received IV fentanyl in addition to tramadol without relief of back pain. Heme/Onc and hepatology teams consulted.     * No surgery found *     Hospital Course: Pt was initially admitted for worsening back pain to the lung transplant service. The patient began to deteriorate, and further workup found recurrence of her previous Lymphoma. RUQ ultrasound showed innumerous liver lesions, which led to worsening of her liver function. Hem/Onc advised that patient was too  unstable for full R-CHOP therapy, so only prednisone was given, with no improvement. Transplant team and palliative care team met with family and patient to discuss goals of care. In the end it was decided to transition patient to comfort care and to stop further invasive investigations. The patient was given Morphine and ativan for anxiety and air hunger. The patient was moved from ICU to the floor and passed 4/7/18 at 1731. Family was at bedside, all questions were answered, and family denied autopsy.  and attending physician were alerted. Please see Death note for full details on expiration.     Consults         Status Ordering Provider     Inpatient consult to Endocrinology  Once     Provider:  (Not yet assigned)    Completed SARAH HANDLEY     Inpatient consult to Hematology/Oncology  Once     Provider:  (Not yet assigned)    Completed VASQUEZ SCHMIDT     Inpatient consult to Hepatology  Once     Provider:  (Not yet assigned)    Completed VASQUEZ SCHMIDT     Inpatient consult to Lung Transplant  Once     Provider:  (Not yet assigned)    Completed MEGAN BANGURA     Inpatient consult to Nephrology  Once     Provider:  (Not yet assigned)    Completed MITCHEL GUY     Inpatient consult to Palliative Care  Once     Provider:  (Not yet assigned)    Completed VASQUEZ SCHMIDT     Inpatient consult to Registered Dietitian/Nutritionist  Once     Provider:  (Not yet assigned)    Completed CLEVELAND ROBERTSON          Significant Diagnostic Studies: Labs: All labs within the past 24 hours have been reviewed    Pending Diagnostic Studies:     None        Final Active Diagnoses:    Diagnosis Date Noted POA    PRINCIPAL PROBLEM:  Palliative care encounter [Z51.5] 04/05/2018 Not Applicable    PTLD (post-transplant lymphoproliferative disorder) [T86.99, D47.Z1] 04/04/2018 Yes    Liver failure [K72.90] 04/04/2018 Yes    Hyperkalemia [E87.5] 04/04/2018 No    Metabolic encephalopathy  [G93.41] 2018 No    Tumor lysis syndrome [E88.3] 2018 Yes    ACRROLL (acute kidney injury) superimposed on CKD stage 3  [N17.9] 2018 Yes    Liver lesion [K76.9] 2018 Yes    Back pain [M54.9] 2016 Yes    Anemia [D64.9] 2016 Yes    Immunosuppression [D89.9] 2012 Yes    Lung replaced by transplant [Z94.2] 2012 Not Applicable      Problems Resolved During this Admission:    Diagnosis Date Noted Date Resolved POA      Discharged Condition:     Disposition:     Medications:  None (patient  at medical facility)  Patient Instructions: None  No discharge procedures on file.  Follow Up: None      Norman Jarvis MD   Lung Transplant  [facility]

## 2018-04-08 NOTE — ASSESSMENT & PLAN NOTE
Mental status continues to decline. Patient now DNR/DNI. Will proceed with palliative care only. Patient to receive lorazepam 2 mg [id number] PRN agitation.

## 2018-04-08 NOTE — HOSPITAL COURSE
Pt was initially admitted for worsening back pain to the lung transplant service. The patient began to deteriorate, and further workup found recurrence of her previous Lymphoma. RUQ ultrasound showed innumerous liver lesions, which led to worsening of her liver function. Hem/Onc advised that patient was too unstable for full R-CHOP therapy, so only prednisone was given, with no improvement. Transplant team and palliative care team met with family and patient to discuss goals of care. In the end it was decided to transition patient to comfort care and to stop further invasive investigations. The patient was given Morphine and ativan for anxiety and air hunger. The patient was moved from ICU to the floor and passed 4/7/18 at 1731. Family was at bedside, all questions were answered, and family denied autopsy.  and attending physician were alerted.

## 2018-04-08 NOTE — ASSESSMENT & PLAN NOTE
Has a hx of lymphoma in 2016 presenting with back pain.  She received 6 cycles of R-CHOP and tolerated it well.  Post treatment scans up until this point have been negative for recurrence.  Over the last 2 months, has had a decrease in her cell lines.  Was hospitalized on 3/27/2018 for symptomatic anemia.  Work-up for the anemia was negative except for an LDH of 1200.  Now presents with worsening pain and CT scan of the abdomen with splenomegaly, multiple new liver lesions, and abdominal lymphadenopathy.  Heme/onc following for suspected recurrence of her lymphoma. Biopsy was not done due to rapid decline in patient's condition.     Pt transitioned to comfort care per her and her family's wishes and passed on 4/7/18

## 2018-04-09 LAB
BACTERIA BLD CULT: NORMAL
BACTERIA BLD CULT: NORMAL

## 2018-04-10 NOTE — PHYSICIAN QUERY
PT Name: Lena Lynn  MR #: [id number]    Physician Query Form - Consultant Diagnosis Clarification     CDS/: Gia Mane               Contact information: [email or web address]    This form is a permanent document in the medical record.     Query Date: April 10, 2018      By submitting this query, we are merely seeking further clarification of documentation.  Please utilize your independent clinical judgment when addressing the question(s) below.      The Medical record contains the following:   Diagnosis Supporting Clinical Information Location in Medical Record   Acute kidney failure with Acute tubular necrosis (ATN) * CARROLL (acute kidney injury) superimposed on CKD stage 3     CARROLL with hyperkalemia and AGMA superimposed on CKD stage 3 due to IATN/episodes of hypotension/now severe anemia/?      Nephrology notes          Do you agree with the Consultants diagnosis of _Acute kidney failure with Acute tubular necrosis (ATN_?                 [  x ]   Yes               [   ]   Yes, but it resolved prior to my assessment of the patient               [   ]   No                [   ]   Clinically insignificant               [   ]   Clinically undetermined               [   ]   Other/Clarification of findings: ___________________________________________

## 2018-04-10 NOTE — PHYSICIAN QUERY
PT Name: Lena Lynn  MR #: [id number]     Physician Query Form - Documentation Clarification      CDS/: Gia Mane               Contact information: [email or web address]      This form is a permanent document in the medical record.     Query Date: April 10, 2018    By submitting this query, we are merely seeking further clarification of documentation. Please utilize your independent clinical judgment when addressing the question(s) below.    The Medical record reflects the following:    Supporting Clinical Findings Location in Medical Record   64yo female s/p BOLT admitted with elevated liver tests, last normal in early March 2018.  Suspect elevations related to mass and less likely chronic liver disease.  Recommend mass biopsy given history of PTLD.    Liver failure    Pt with worsening alk phos, total bili, and AST in a cholestatic pattern.  Has multiple hypoechoic liver lesions that are concerning for malignancy.  Ammonia 41.  RUQ US with multiple liver lesions and no biliary obstruction or vascular thrombosis.  Total bili 29 with direct bili >14.  Lactate elevated at >12 related to liver failure with less concern for sepsis.  Initial CT abd with new liver lesions, splenomegaly, and abdominal lymphadenopathy.  INR 1.9 today.  Hepatology following and appreciate recommendations.       Hepatology Consult               Progress note 4/4                                                                                        Doctor, Please specify diagnosis or diagnoses associated with above clinical findings.    Provider Use Only      [  x  ] Acute Liver failure  [    ] Acute on Chronic Liver failure  [    ] Chronic Liver failure  [    ] Other:________________                                                                                                             [  ] Clinically undetermined

## 2018-04-10 NOTE — PHYSICIAN QUERY
PT Name: Lena Lynn  MR #: [id number]     Physician Query Form - Documentation Clarification      CDS/: Gia Mane               Contact information: [email or web address]      This form is a permanent document in the medical record.     Query Date: April 10, 2018    By submitting this query, we are merely seeking further clarification of documentation. Please utilize your independent clinical judgment when addressing the question(s) below.    The Medical record reflects the following:    Supporting Clinical Findings Location in Medical Record   Patient with worsening hypoxia overnight.  Patient received 1000 cc bolus in ED prior to maintenance fluids on the floor. CXR shows evidence of pulmonary edema this AM.  Patient was put on 4L NC overnight for worsening O2. Patient denies SOB, chest pain, cough, lightheadedness, dizziness.  Currently 90% on 4L NC. Patient continues to complain of non-localized lumbar back pain       Progress note 4/3                                                                            Doctor, Please specify diagnosis or diagnoses associated with above clinical findings.    Provider Use Only      [    ] Acute pulmonary edema  [    ] Chronic pulmonary edema  [    ] Other:________________                                                                                                             [ x ] Clinically undetermined

## 2018-05-04 NOTE — ASSESSMENT & PLAN NOTE
Likely multifactorial - Patient is s/p RCHOP ending in 2016, known vertebral lytic lesion and stable L4 compression fracture. Outpatient meds include tramadol 50mg and norco 5/325.     Pain well controlled by morphine 1 mg [id number] PRN pain/discomfort. Pt appears comfortable and not in any pain.    Predisposing to recurrent urinary tract infections  Urology consulted

## 2020-03-18 NOTE — ASSESSMENT & PLAN NOTE
Outpatient regimen - tacrolimus 1.5 mg BID, prednisone 5 mg daily.      Tacrolimus on hold for CARROLL and hyperkalemia.  Currently getting day 2/5 high dose Solu-Medrol 80mg for suspected lymphoma.    N/A

## 2022-08-16 NOTE — Clinical Note
Lena Lynn should be admitted to Medicine.   Message sent to patient with results. Future labs ordered

## 2023-02-08 NOTE — PLAN OF CARE
Discharge instructions given and explained to patient and family with verbalization of understanding all instructions.  Patients v/s stable, denies n/v and tolerating po, rates pain level tolerable, IV removed, and family at bedside for patient discharge home.    Consent 1/Introductory Paragraph: The rationale for Mohs was explained to the patient and consent was obtained. The risks, benefits and alternatives to therapy were discussed in detail. Specifically, the risks of infection, scarring, bleeding, prolonged wound healing, incomplete removal, allergy to anesthesia, nerve injury and recurrence were addressed. Prior to the procedure, the treatment site was clearly identified and confirmed by the patient. All components of Universal Protocol/PAUSE Rule completed.